# Patient Record
Sex: FEMALE | Race: BLACK OR AFRICAN AMERICAN | Employment: FULL TIME | URBAN - METROPOLITAN AREA
[De-identification: names, ages, dates, MRNs, and addresses within clinical notes are randomized per-mention and may not be internally consistent; named-entity substitution may affect disease eponyms.]

---

## 2017-05-22 ENCOUNTER — HOSPITAL ENCOUNTER (EMERGENCY)
Age: 32
Discharge: HOME | End: 2017-05-22
Attending: EMERGENCY MEDICINE
Payer: SELF-PAY

## 2017-05-22 VITALS
OXYGEN SATURATION: 98 % | WEIGHT: 216.27 LBS | RESPIRATION RATE: 18 BRPM | HEIGHT: 65 IN | TEMPERATURE: 98.5 F | BODY MASS INDEX: 36.03 KG/M2 | SYSTOLIC BLOOD PRESSURE: 143 MMHG | DIASTOLIC BLOOD PRESSURE: 90 MMHG

## 2017-05-22 DIAGNOSIS — I10 ESSENTIAL HYPERTENSION: ICD-10-CM

## 2017-05-22 DIAGNOSIS — M62.838 MUSCLE SPASM: ICD-10-CM
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
## 2017-05-22 DIAGNOSIS — M54.2 NECK PAIN: Primary | ICD-10-CM

## 2017-05-22 PROCEDURE — 99282 EMERGENCY DEPT VISIT SF MDM: CPT

## 2017-05-22 RX ORDER — METHOCARBAMOL 750 MG/1
750 TABLET, FILM COATED ORAL 3 TIMES DAILY
Qty: 15 TAB | Refills: 0 | Status: SHIPPED | OUTPATIENT
Start: 2017-05-22 | End: 2017-05-27

## 2017-05-22 RX ORDER — OXYCODONE AND ACETAMINOPHEN 5; 325 MG/1; MG/1
1 TABLET ORAL
Qty: 15 TAB | Refills: 0 | Status: SHIPPED | OUTPATIENT
Start: 2017-05-22 | End: 2017-06-06

## 2017-05-22 RX ORDER — GLIPIZIDE 5 MG/1
5 TABLET ORAL DAILY
COMMUNITY
End: 2017-09-08

## 2017-05-22 RX ORDER — METFORMIN HYDROCHLORIDE 1000 MG/1
1000 TABLET ORAL 2 TIMES DAILY WITH MEALS
COMMUNITY
End: 2019-10-25
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
## 2017-05-22 NOTE — ED NOTES
PA at bedside to provide discharge paperwork. Vital signs stable. Pt in no apparent distress at this time. Mental status at baseline. Ambulatory to waiting room with steady gate, discharge paperwork in hand.
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
## 2017-05-26 ENCOUNTER — HOSPITAL ENCOUNTER (EMERGENCY)
Age: 32
Discharge: HOME | End: 2017-05-26
Attending: EMERGENCY MEDICINE
Payer: SELF-PAY

## 2017-05-26 VITALS
WEIGHT: 214.73 LBS | TEMPERATURE: 97.8 F | HEIGHT: 65 IN | RESPIRATION RATE: 18 BRPM | HEART RATE: 98 BPM | DIASTOLIC BLOOD PRESSURE: 96 MMHG | BODY MASS INDEX: 35.78 KG/M2 | SYSTOLIC BLOOD PRESSURE: 164 MMHG | OXYGEN SATURATION: 97 %

## 2017-05-26 DIAGNOSIS — Z76.0 MEDICATION REFILL: Primary | ICD-10-CM

## 2017-05-26 PROCEDURE — 99282 EMERGENCY DEPT VISIT SF MDM: CPT
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
## 2017-05-26 NOTE — DISCHARGE INSTRUCTIONS
Medication Refill: Care Instructions  Your Care Instructions  Medicines are a big part of treatment for many health problems. So it can be upsetting to run out of your medicine. It may even be dangerous to stop a medicine suddenly. The doctor may have given you enough medicine to help you until you can see your regular doctor. The doctor has checked you carefully, but problems can develop later. If you notice any problems or new symptoms, get medical treatment right away. Follow-up care is a key part of your treatment and safety. Be sure to make and go to all appointments, and call your doctor if you are having problems. It's also a good idea to know your test results and keep a list of the medicines you take. How can you care for yourself at home? · Be safe with medicines. Take your medicines exactly as prescribed. · Know when you will run out of your medicine. Use a calendar to remind you to get a refill. Don't wait until you have only a few pills left. · Talk with your doctor or pharmacist about your medicine. Find out what to do if you miss a dose. When should you call for help? Call your doctor now or seek immediate medical care if:  · You have problems with your medicine. Watch closely for changes in your health, and be sure to contact your doctor if you have any problems. Where can you learn more? Go to http://asael-sloan.info/. Enter K326 in the search box to learn more about \"Medication Refill: Care Instructions. \"  Current as of: August 14, 2016  Content Version: 11.2  © 4345-1433 [employer]. Care instructions adapted under license by [employer] (which disclaims liability or warranty for this information). If you have questions about a medical condition or this instruction, always ask your healthcare professional. [address] any warranty or liability for your use of this information.   Thank you for allowing us to provide you with excellent care today. We hope we addressed all of your concerns and needs. We strive to provide excellent quality care in the Emergency Department. Please rate us as excellent, as anything less than excellent does not meet our expectations. If you feel that you have not received excellent quality care or timely care, please ask to speak to the nurse manager. Please choose us in the future for your continued health care needs. The exam and treatment you received in the Emergency Department were for an urgent problem and are not intended as complete care. It is important that you follow-up with a doctor, nurse practitioner, or  [id number] assistant to: (1) confirm your diagnosis, (2) re-evaluation of changes in your illness and treatment, and (3) for ongoing care. If your symptoms become worse or you do not improve as expected and you are unable to reach your usual health care provider, you should return to the Emergency Department. We are available 24 hours a day. Take this sheet with you when you go to your follow-up visit. If you have any problem arranging the follow-up visit, contact the Emergency Department immediately. Make an appointment with your Primary Care doctor for follow up of this visit. Return to the ER if you are unable to be seen in the time recommended on your discharge instructions. Joann Carvalho Pagé FNP-BC    Narcotic and Sedating Medication Information  The Center for Disease Control has recently highlighted that opioid abuse is a major medical condition effecting patients across the [place]. Since , approximately 165,000 people have  from prescription opioids. In an effort to decrease these untimely deaths associated with prescription narcotics and sedating drugs, the Emergency Department has adopted the following policy with regard to prescribing narcotic and sedating medications.    1. When patients come to the Emergency Department (ED) with acute medical conditions in which the Emergency Department provider feels appropriate to prescribe narcotic or sedating pain medication, the provider will prescribe these in very limited quantities. The amount of these medications will last only until you can see your primary care provider in his/her office. Any patient who returns to the ED seeking refills will be given only non-narcotic pain medications. 2. Non-narcotic pain medication only will be given to patients who have frequent ED visits due to chronic, on-going pain conditions, such as migraine headaches, back and neck pain, dental pain, fibromyalgia and/or neuropathies. 3. In the event of an acute medical condition exists and the emergency physician feels it is necessary that the patient be given a narcotic or sedating medication - A responsible adult  must be present in the room prior to the medication being given by the nurse. 4. Prescriptions for narcotic or sedating medications that have been lost, stolen or  will not be refilled in the Emergency Department. Patients who have chronic pain will receive non-narcotic pain prescriptions until seen by their primary care physician. It is every patients personal responsibility to maintain active prescriptions with his or her primary care physician or specialist.   5. The Emergency Department have lists available of Primary Care Providers Accepting New Patients. If you do not have a primary care provider, this listing will assist you in obtaining a provider for follow-up of your medical condition. This list will be given to you with your discharge instructions. 6. If the ED provider decides to prescribe a narcotic or sedating medication, most patients names will be checked first through the [place]. This database is a record of controlled substance medication prescriptions that the patient has received.  This has been established by [place] and other states within the compact in an effort to eliminate the dangerous, and often life threatening, practice of obtaining multiple prescriptions from different medical providers. NARCOTIC AND SEDATING MEDICATION THERAPY - SIDE EFFECTS, RISKS AND COMPLICATIONS   The patient understands that narcotic analgesics may result in physical dependence that may ultimately require slow weaning once the pain condition improves. Immediate discontinuation of this medication is not advised. Tolerance to the medication may develop after long-term usage, which means that ultimately these medications may become less effective. Other side effects may include . ..  Respiratory Depression resulting in respiratory arrest and/or death, as well as cardiac arrest and/or death   Tolerance and/or physical dependence necessitating tapered discontinuation of the medications   Withdrawal phenomenon with abrupt discontinuation of the medication causing significant side effects such as palpitations, diaphoresis, elevated pulse and blood pressure   Disorientation, resulting in falls and significant injury   Constipation and bowel obstruction, possibly requiring surgical intervention and potentially ischemic (dead) bowel, sepsis and death   Allergic and/or anaphylactic reactions to the medications resulting in hypotension, tachycardia, arrhythmias, respiratory and cardiac arrest and death   Potentiation of other sedative medications causing additive and/or synergistic interactions and greater than expected or enhanced side effects  What Not to Do While Taking Narcotics or Sedating Medications   Any kind of activity where judgment is required such as signing important documents, caring for the sick, the elderly, or the very young.    Driving a car, motorcycle, truck, or any motorized device  [employer]   Working in high risk areas (such as construction sites, elevated work areas, working with power tools etc.)   Drinking alcohol is highly discouraged while on narcotics and sedative medications due to potent and unpredictable enhancement of central nervous system depression of these two substances when taken together.

## 2017-06-14 ENCOUNTER — APPOINTMENT (OUTPATIENT)
Dept: GENERAL RADIOLOGY | Age: 32
End: 2017-06-14
Attending: EMERGENCY MEDICINE
Payer: SELF-PAY

## 2017-06-14 ENCOUNTER — HOSPITAL ENCOUNTER (EMERGENCY)
Age: 32
Discharge: HOME | End: 2017-06-14
Attending: EMERGENCY MEDICINE
Payer: SELF-PAY

## 2017-06-14 VITALS
BODY MASS INDEX: 34.99 KG/M2 | TEMPERATURE: 98.6 F | HEART RATE: 78 BPM | OXYGEN SATURATION: 98 % | DIASTOLIC BLOOD PRESSURE: 76 MMHG | SYSTOLIC BLOOD PRESSURE: 128 MMHG | HEIGHT: 65 IN | RESPIRATION RATE: 18 BRPM | WEIGHT: 210 LBS

## 2017-06-14 DIAGNOSIS — M79.89 LEG SWELLING: Primary | ICD-10-CM

## 2017-06-14 DIAGNOSIS — R60.0 BILATERAL EDEMA OF LOWER EXTREMITY: ICD-10-CM

## 2017-06-14 DIAGNOSIS — N39.0 URINARY TRACT INFECTION WITHOUT HEMATURIA, SITE UNSPECIFIED: ICD-10-CM

## 2017-06-14 LAB
ALBUMIN SERPL BCP-MCNC: 3.3 G/DL (ref 3.5–5)
ALBUMIN/GLOB SERPL: 0.9 {RATIO} (ref 1.1–2.2)
ALP SERPL-CCNC: 106 U/L (ref 45–117)
ALT SERPL-CCNC: 18 U/L (ref 12–78)
ANION GAP BLD CALC-SCNC: 6 MMOL/L (ref 5–15)
APPEARANCE UR: CLEAR
AST SERPL W P-5'-P-CCNC: 9 U/L (ref 15–37)
BACTERIA URNS QL MICRO: ABNORMAL /HPF
BASOPHILS # BLD AUTO: 0 K/UL (ref 0–0.1)
BASOPHILS # BLD: 0 % (ref 0–1)
BILIRUB SERPL-MCNC: 0.3 MG/DL (ref 0.2–1)
BILIRUB UR QL: NEGATIVE
BNP SERPL-MCNC: 24 PG/ML (ref 0–125)
BUN SERPL-MCNC: 7 MG/DL (ref 6–20)
BUN/CREAT SERPL: 10 (ref 12–20)
CALCIUM SERPL-MCNC: 9.4 MG/DL (ref 8.5–10.1)
CHLORIDE SERPL-SCNC: 99 MMOL/L (ref 97–108)
CO2 SERPL-SCNC: 29 MMOL/L (ref 21–32)
COLOR UR: ABNORMAL
CREAT SERPL-MCNC: 0.72 MG/DL (ref 0.55–1.02)
EOSINOPHIL # BLD: 0.1 K/UL (ref 0–0.4)
EOSINOPHIL NFR BLD: 1 % (ref 0–7)
EPITH CASTS URNS QL MICRO: ABNORMAL /LPF
ERYTHROCYTE [DISTWIDTH] IN BLOOD BY AUTOMATED COUNT: 11.7 % (ref 11.5–14.5)
GLOBULIN SER CALC-MCNC: 3.6 G/DL (ref 2–4)
GLUCOSE SERPL-MCNC: 332 MG/DL (ref 65–100)
GLUCOSE UR STRIP.AUTO-MCNC: >1000 MG/DL
HCT VFR BLD AUTO: 36.7 % (ref 35–47)
HGB BLD-MCNC: 12.5 G/DL (ref 11.5–16)
HGB UR QL STRIP: NEGATIVE
KETONES UR QL STRIP.AUTO: NEGATIVE MG/DL
LEUKOCYTE ESTERASE UR QL STRIP.AUTO: NEGATIVE
LYMPHOCYTES # BLD AUTO: 43 % (ref 12–49)
LYMPHOCYTES # BLD: 3.5 K/UL (ref 0.8–3.5)
MAGNESIUM SERPL-MCNC: 1.8 MG/DL (ref 1.6–2.4)
MCH RBC QN AUTO: 28.3 PG (ref 26–34)
MCHC RBC AUTO-ENTMCNC: 34.1 G/DL (ref 30–36.5)
MCV RBC AUTO: 83 FL (ref 80–99)
MONOCYTES # BLD: 0.4 K/UL (ref 0–1)
MONOCYTES NFR BLD AUTO: 5 % (ref 5–13)
NEUTS SEG # BLD: 4.1 K/UL (ref 1.8–8)
NEUTS SEG NFR BLD AUTO: 51 % (ref 32–75)
NITRITE UR QL STRIP.AUTO: POSITIVE
PH UR STRIP: 6.5 [PH] (ref 5–8)
PLATELET # BLD AUTO: 209 K/UL (ref 150–400)
POTASSIUM SERPL-SCNC: 4.4 MMOL/L (ref 3.5–5.1)
PROT SERPL-MCNC: 6.9 G/DL (ref 6.4–8.2)
PROT UR STRIP-MCNC: NEGATIVE MG/DL
RBC # BLD AUTO: 4.42 M/UL (ref 3.8–5.2)
RBC #/AREA URNS HPF: ABNORMAL /HPF (ref 0–5)
SODIUM SERPL-SCNC: 134 MMOL/L (ref 136–145)
SP GR UR REFRACTOMETRY: 1.01 (ref 1–1.03)
UA: UC IF INDICATED,UAUC: ABNORMAL
UROBILINOGEN UR QL STRIP.AUTO: 1 EU/DL (ref 0.2–1)
WBC # BLD AUTO: 8.2 K/UL (ref 3.6–11)
WBC URNS QL MICRO: ABNORMAL /HPF (ref 0–4)

## 2017-06-14 PROCEDURE — 85025 COMPLETE CBC W/AUTO DIFF WBC: CPT | Performed by: EMERGENCY MEDICINE

## 2017-06-14 PROCEDURE — 81001 URINALYSIS AUTO W/SCOPE: CPT | Performed by: EMERGENCY MEDICINE

## 2017-06-14 PROCEDURE — 74011250637 HC RX REV CODE- 250/637: Performed by: EMERGENCY MEDICINE

## 2017-06-14 PROCEDURE — 36415 COLL VENOUS BLD VENIPUNCTURE: CPT | Performed by: EMERGENCY MEDICINE

## 2017-06-14 PROCEDURE — 71020 XR CHEST PA LAT: CPT

## 2017-06-14 PROCEDURE — 83880 ASSAY OF NATRIURETIC PEPTIDE: CPT | Performed by: EMERGENCY MEDICINE

## 2017-06-14 PROCEDURE — 87086 URINE CULTURE/COLONY COUNT: CPT | Performed by: EMERGENCY MEDICINE

## 2017-06-14 PROCEDURE — 80053 COMPREHEN METABOLIC PANEL: CPT | Performed by: EMERGENCY MEDICINE

## 2017-06-14 PROCEDURE — 99285 EMERGENCY DEPT VISIT HI MDM: CPT

## 2017-06-14 PROCEDURE — 83735 ASSAY OF MAGNESIUM: CPT | Performed by: EMERGENCY MEDICINE

## 2017-06-14 PROCEDURE — 87147 CULTURE TYPE IMMUNOLOGIC: CPT | Performed by: EMERGENCY MEDICINE

## 2017-06-14 RX ORDER — SODIUM CHLORIDE 0.9 % (FLUSH) 0.9 %
5-10 SYRINGE (ML) INJECTION AS NEEDED
Status: DISCONTINUED | OUTPATIENT
Start: 2017-06-14 | End: 2017-06-14 | Stop reason: HOSPADM

## 2017-06-14 RX ORDER — HYDROCODONE BITARTRATE AND ACETAMINOPHEN 5; 325 MG/1; MG/1
1 TABLET ORAL
Qty: 20 TAB | Refills: 0 | Status: SHIPPED | OUTPATIENT
Start: 2017-06-14 | End: 2017-06-25

## 2017-06-14 RX ORDER — CEPHALEXIN 500 MG/1
500 CAPSULE ORAL 4 TIMES DAILY
Qty: 28 CAP | Refills: 0 | Status: SHIPPED | OUTPATIENT
Start: 2017-06-14 | End: 2017-06-21

## 2017-06-14 RX ORDER — SODIUM CHLORIDE 0.9 % (FLUSH) 0.9 %
5-10 SYRINGE (ML) INJECTION EVERY 8 HOURS
Status: DISCONTINUED | OUTPATIENT
Start: 2017-06-14 | End: 2017-06-14 | Stop reason: HOSPADM

## 2017-06-14 RX ORDER — HYDROCHLOROTHIAZIDE 12.5 MG/1
12.5 TABLET ORAL DAILY
Qty: 30 TAB | Refills: 0 | Status: SHIPPED | OUTPATIENT
Start: 2017-06-14 | End: 2017-09-08

## 2017-06-14 RX ORDER — HYDROCODONE BITARTRATE AND ACETAMINOPHEN 5; 325 MG/1; MG/1
1 TABLET ORAL
Status: COMPLETED | OUTPATIENT
Start: 2017-06-14 | End: 2017-06-14

## 2017-06-14 RX ADMIN — HYDROCODONE BITARTRATE AND ACETAMINOPHEN 1 TABLET: 5; 325 TABLET ORAL at 02:00

## 2017-06-14 RX ADMIN — Medication 10 ML: at 02:12

## 2017-06-14 NOTE — ED NOTES
Pt report BLE leg swelling x2 days. Denies SOB/CP/fevers/chills/N/V/D. Denies recent diet changes. H/o DM, reports has been elevating feet without relief.

## 2017-06-14 NOTE — ED PROVIDER NOTES
HPI Comments: Kyleigh Bianchi is a 33 y.o. female, pmhx DM, who presents ambulatory to the ED c/o progressively worsening BLE swelling and pain x 2 days. She states her sxs are exacerbated with ambulation. 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 Pt notes she ate a bag of Fritos the day before onset of sxs and questions whether or not this is the cause. She states she otherwise sits at a desk all day with her feet dangling from her chair. She denies any hx of similar sxs. Pt denies any recent medications for her current sxs. Pt specifically denies any recent fever, chills, nausea, vomiting, diarrhea, abd pain, CP, SOB, lightheadedness, dizziness, HA, heart palpitations, urinary sxs, changes in BM, changes in PO intake, melena, hematochezia, cough, or congestion. PCP: Pepe Lemus NP    PMHx: Significant for DM, back pain  PSHx: Significant for cholecystectomy,   Social Hx: -tobacco, -EtOH, -Illicit Drugs    There are no other complaints, changes, or physical findings at this time. The history is provided by the patient. Past Medical History:   Diagnosis Date    Abnormal Pap smear     abnormal pap at 15 y/o normal since     Back pain 2015    [facility]  [facility]ency [facility]    Diabetes [facility])     Diabetes mellitus     Gestational Diabetes with last two pregnancies-diet controlled       Past Surgical History:   Procedure Laterality Date    HX  SECTION      HX CHOLECYSTECTOMY      HX OTHER SURGICAL      cholecystectomy          Family History:   Problem Relation Age of Onset    Diabetes Father     Hypertension Father     Stroke Father     Kidney Disease Father        Social History     Social History    Marital status: SINGLE     Spouse name: N/A    Number of children: N/A    Years of education: N/A     Occupational History    Not on file.      Social History Main Topics    Smoking status: Never Smoker    Smokeless tobacco: Never Used    Alcohol use No    Drug use: No    Sexual activity: Yes     Birth control/ protection: None     Other Topics Concern    Not on file     Social History Narrative         ALLERGIES: Tramadol    Review of Systems   Constitutional: Negative for chills and fever. HENT: Negative for congestion, ear pain, rhinorrhea and sore throat. Respiratory: Negative for cough and shortness of breath. Cardiovascular: Positive for leg swelling. Negative for chest pain and palpitations. Gastrointestinal: Negative for abdominal pain, constipation, diarrhea, nausea and vomiting. No melena  No hematochezia   Endocrine: Negative for polyuria. Genitourinary: Negative for dysuria, frequency and hematuria. Neurological: Negative for dizziness, weakness, light-headedness, numbness and headaches. No tingling   All other systems reviewed and are negative. Vitals:    06/14/17 0130 06/14/17 0200 06/14/17 0316 06/14/17 0330   BP: 159/87 124/72 127/82 128/76   Pulse: 89 74 75 78   Resp: 18 18 18 18   Temp:       SpO2: 100% 96% 97% 98%   Weight:       Height:                Physical Exam   Nursing note and vitals reviewed.   General appearance - well nourished, well appearing, and in no distress  Eyes - pupils equal and reactive, extraocular eye movements intact  ENT - mucous membranes moist, pharynx normal without lesions  Neck - supple, no significant adenopathy; non-tender to palpation  Chest - clear to auscultation, no wheezes, rales or rhonchi; non-tender to palpation  Heart - normal rate and regular rhythm, S1 and S2 normal, no murmurs noted  Abdomen - soft, nontender, nondistended, no masses or organomegaly  Musculoskeletal - no joint tenderness, deformity; normal ROM, 1+ pitting edema to BLE up to the mid-calves  Extremities - peripheral pulses normal  Skin - normal coloration and turgor, no rashes  Neurological - alert, oriented x3, normal speech, no focal findings or movement disorder noted  Written by Michel Law ED Scribe, as dictated by Gaston Ennis MD    MDM  Number of Diagnoses or Management Options  Bilateral edema of lower extremity:   Leg swelling:   Urinary tract infection without hematuria, site unspecified:   Diagnosis management comments:   DDx: Dependent edema, CHF, excessive salt intake       Amount and/or Complexity of Data Reviewed  Clinical lab tests: reviewed and ordered  Tests in the radiology section of CPT®: reviewed and ordered  Review and summarize past medical records: yes  Independent visualization of images, tracings, or specimens: yes    Patient Progress  Patient progress: stable      Procedures      Progress note:  3:42 AM  Pt noted to be feeling better, ready for discharge. UTI incidental finding; will cover with Keflex. Updated pt and/or family on all final lab and imaging findings. Will follow up as instructed. All questions have been answered, pt voiced understanding and agreement with plan. Narcotics were prescribed, pt was advised not to drive or operate heavy machinery. Abx were prescribed, pt advised that diarrhea and rash are possible side effects of the medications. Specific return precautions provided as well as instructions to return to the ED should sx worsen at any time. Vital signs stable for discharge.    Written by Michel Law ED Scribe, as dictated by Bruna Vegas MD     LABORATORY TESTS:  Recent Results (from the past 12 hour(s))   CBC WITH AUTOMATED DIFF    Collection Time: 06/14/17  2:14 AM   Result Value Ref Range    WBC 8.2 3.6 - 11.0 K/uL    RBC 4.42 3.80 - 5.20 M/uL    HGB 12.5 11.5 - 16.0 g/dL    HCT 36.7 35.0 - 47.0 %    MCV 83.0 80.0 - 99.0 FL    MCH 28.3 26.0 - 34.0 PG    MCHC 34.1 30.0 - 36.5 g/dL    RDW 11.7 11.5 - 14.5 %    PLATELET 174 003 - 731 K/uL    NEUTROPHILS 51 32 - 75 %    LYMPHOCYTES 43 12 - 49 %    MONOCYTES 5 5 - 13 %    EOSINOPHILS 1 0 - 7 %    BASOPHILS 0 0 - 1 %    ABS. NEUTROPHILS 4.1 1.8 - 8.0 K/UL    ABS. LYMPHOCYTES 3.5 0.8 - 3.5 K/UL    ABS. MONOCYTES 0.4 0.0 - 1.0 K/UL    ABS. EOSINOPHILS 0.1 0.0 - 0.4 K/UL    ABS. BASOPHILS 0.0 0.0 - 0.1 K/UL   METABOLIC PANEL, COMPREHENSIVE    Collection Time: 06/14/17  2:14 AM   Result Value Ref Range    Sodium 134 (L) 136 - 145 mmol/L    Potassium 4.4 3.5 - 5.1 mmol/L    Chloride 99 97 - 108 mmol/L    CO2 29 21 - 32 mmol/L    Anion gap 6 5 - 15 mmol/L    Glucose 332 (H) 65 - 100 mg/dL    BUN 7 6 - 20 MG/DL    Creatinine 0.72 0.55 - 1.02 MG/DL    BUN/Creatinine ratio 10 (L) 12 - 20      GFR est AA >60 >60 ml/min/1.73m2    GFR est non-AA >60 >60 ml/min/1.73m2    Calcium 9.4 8.5 - 10.1 MG/DL    Bilirubin, total 0.3 0.2 - 1.0 MG/DL    ALT (SGPT) 18 12 - 78 U/L    AST (SGOT) 9 (L) 15 - 37 U/L    Alk.  phosphatase 106 45 - 117 U/L    Protein, total 6.9 6.4 - 8.2 g/dL    Albumin 3.3 (L) 3.5 - 5.0 g/dL    Globulin 3.6 2.0 - 4.0 g/dL    A-G Ratio 0.9 (L) 1.1 - 2.2     MAGNESIUM    Collection Time: 06/14/17  2:14 AM   Result Value Ref Range    Magnesium 1.8 1.6 - 2.4 mg/dL   PRO-BNP    Collection Time: 06/14/17  2:14 AM   Result Value Ref Range    NT pro-BNP 24 0 - 125 PG/ML   URINALYSIS W/ REFLEX CULTURE    Collection Time: 06/14/17  2:14 AM   Result Value Ref Range    Color YELLOW/STRAW      Appearance CLEAR CLEAR      Specific gravity 1.010 1.003 - 1.030      pH (UA) 6.5 5.0 - 8.0      Protein NEGATIVE  NEG mg/dL    Glucose >1000 (A) NEG mg/dL    Ketone NEGATIVE  NEG mg/dL    Bilirubin NEGATIVE  NEG      Blood NEGATIVE  NEG      Urobilinogen 1.0 0.2 - 1.0 EU/dL    Nitrites POSITIVE (A) NEG      Leukocyte Esterase NEGATIVE  NEG      WBC 0-4 0 - 4 /hpf    RBC 0-5 0 - 5 /hpf    Epithelial cells FEW FEW /lpf    Bacteria 3+ (A) NEG /hpf    UA:UC IF INDICATED URINE CULTURE ORDERED (A) CNI         IMAGING RESULTS:     CXR Results  (Last 48 hours)               06/14/17 0222  XR CHEST PA LAT Final result    Impression: impression: No acute changes. Narrative:  Clinical indication: Chest pain. Frontal and lateral views of the chest obtained. The heart size is normal. There   is no acute infiltrate. MEDICATIONS GIVEN:  Medications   sodium chloride (NS) flush 5-10 mL (10 mL IntraVENous Given 6/14/17 0212)   sodium chloride (NS) flush 5-10 mL (not administered)   HYDROcodone-acetaminophen (NORCO) 5-325 mg per tablet 1 Tab (1 Tab Oral Given 6/14/17 0200)       IMPRESSION:  1. Leg swelling    2. Bilateral edema of lower extremity    3. Urinary tract infection without hematuria, site unspecified        PLAN:  1. Discharge Medication List as of 6/14/2017  3:40 AM      START taking these medications    Details   hydroCHLOROthiazide (HYDRODIURIL) 12.5 mg tablet Take 1 Tab by mouth daily. , Print, Disp-30 Tab, R-0      HYDROcodone-acetaminophen (NORCO) 5-325 mg per tablet Take 1 Tab by mouth every four (4) hours as needed for Pain. Max Daily Amount: 6 Tabs., Print, Disp-20 Tab, R-0      cephALEXin (KEFLEX) 500 mg capsule Take 1 Cap by mouth four (4) times daily for 7 days. , Print, Disp-28 Cap, R-0         CONTINUE these medications which have NOT CHANGED    Details   metFORMIN (GLUCOPHAGE) 1,000 mg tablet Take 1,000 mg by mouth daily. , Historical Med      glipiZIDE (GLUCOTROL) 5 mg tablet Take 5 mg by mouth daily. , Historical Med           2. Follow-up Information     Follow up With Details Comments [address], NP In 2 days  [address] [facility]  [phone]      hospitals EMERGENCY DEPT  If symptoms worsen [address]  [address] [facility]  [phone]        Return to ED if worse     DISCHARGE NOTE:  3:43 AM  The patient's results have been reviewed with family and/or caregiver. They verbally convey their understanding and agreement of the patient's signs, symptoms, diagnosis, treatment, and prognosis.  They additionally agree to follow up as recommended in the discharge instructions or to return to the Emergency Room should the patient's condition change prior to their follow-up appointment. The family and/or caregiver verbally agrees with the care-plan and all of their questions have been answered. The discharge instructions have also been provided to the them along with educational information regarding the patient's diagnosis and a list of reasons why the patient would want to return to the ER prior to their follow-up appointment should their condition change. Written by Bunny Aguilar, ED Scribe, as dictated by Lio Lara MD.         This note is prepared by Bunny Aguilar, acting as Scribe for MD Bruna Gama MD : The scribe's documentation has been prepared under my direction and personally reviewed by me in its entirety. I confirm that the note above accurately reflects all work, treatment, procedures, and medical decision making performed by me. This note will not be viewable in [address]

## 2017-06-14 NOTE — DISCHARGE INSTRUCTIONS
Urinary Tract Infection in Women: Care Instructions  Your Care Instructions    A urinary tract infection, or UTI, is a general term for an infection anywhere between the kidneys and the urethra (where urine comes out). Most UTIs are bladder infections. They often cause pain or burning when you urinate. UTIs are caused by bacteria and can be cured with antibiotics. Be sure to complete your treatment so that the infection goes away. Follow-up care is a key part of your treatment and safety. Be sure to make and go to all appointments, and call your doctor if you are having problems. It's also a good idea to know your test results and keep a list of the medicines you take. How can you care for yourself at home? · Take your antibiotics as directed. Do not stop taking them just because you feel better. You need to take the full course of antibiotics. · Drink extra water and other fluids for the next day or two. This may help wash out the bacteria that are causing the infection. (If you have kidney, heart, or liver disease and have to limit fluids, talk with your doctor before you increase your fluid intake.)  · Avoid drinks that are carbonated or have caffeine. They can irritate the bladder. · Urinate often. Try to empty your bladder each time. · To relieve pain, take a hot bath or lay a heating pad set on low over your lower belly or genital area. Never go to sleep with a heating pad in place. To prevent UTIs  · Drink plenty of water each day. This helps you urinate often, which clears bacteria from your system. (If you have kidney, heart, or liver disease and have to limit fluids, talk with your doctor before you increase your fluid intake.)  · Urinate when you need to. · Urinate right after you have sex. · Change sanitary pads often. · Avoid douches, bubble baths, feminine hygiene sprays, and other feminine hygiene products that have deodorants.   · After going to the bathroom, wipe from front to back.  When should you call for help? Call your doctor now or seek immediate medical care if:  · Symptoms such as fever, chills, nausea, or vomiting get worse or appear for the first time. · You have new pain in your back just below your rib cage. This is called flank pain. · There is new blood or pus in your urine. · You have any problems with your antibiotic medicine. Watch closely for changes in your health, and be sure to contact your doctor if:  · You are not getting better after taking an antibiotic for 2 days. · Your symptoms go away but then come back. Where can you learn more? Go to http://asael-sloan.info/. Enter C525 in the search box to learn more about \"Urinary Tract Infection in Women: Care Instructions. \"  Current as of: November 28, 2016  Content Version: 11.2  © 5328-2209 [employer]. Care instructions adapted under license by [employer] (which disclaims liability or warranty for this information). If you have questions about a medical condition or this instruction, always ask your healthcare professional. [place] [address] any warranty or liability for your use of this information. Leg and Ankle Edema: Care Instructions  Your Care Instructions  Swelling in the legs, ankles, and feet is called edema. It is common after you sit or stand for a while. Long plane flights or car rides often cause swelling in the legs and feet. You may also have swelling if you have to stand for long periods of time at your job. Problems with the veins in the legs (varicose veins) and changes in hormones can also cause swelling. Sometimes the swelling in the ankles and feet is caused by a more serious problem, such as heart failure, infection, blood clots, or liver or kidney disease. Follow-up care is a key part of your treatment and safety. Be sure to make and go to all appointments, and call your doctor if you are having problems.  Its also a good idea to know your test results and keep a list of the medicines you take. How can you care for yourself at home? · If your doctor gave you medicine, take it as prescribed. Call your doctor if you think you are having a problem with your medicine. · Whenever you are resting, raise your legs up. Try to keep the swollen area higher than the level of your heart. · Take breaks from standing or sitting in one position. ¨ Walk around to increase the blood flow in your lower legs. ¨ Move your feet and ankles often while you stand, or tighten and relax your leg muscles. · Wear support stockings. Put them on in the morning, before swelling gets worse. · Eat a balanced diet. Lose weight if you need to. · Limit the amount of salt (sodium) in your diet. Salt holds fluid in the body and may increase swelling. When should you call for help? Call 911 anytime you think you may need emergency care. For example, call if:  · You have symptoms of a blood clot in your lung (called a pulmonary embolism). These may include:  ¨ Sudden chest pain. ¨ Trouble breathing. ¨ Coughing up blood. Call your doctor now or seek immediate medical care if:  · You have signs of a blood clot, such as:  ¨ Pain in your calf, back of the knee, thigh, or groin. ¨ Redness and swelling in your leg or groin. · You have symptoms of infection, such as:  ¨ Increased pain, swelling, warmth, or redness. ¨ Red streaks or pus. ¨ A fever. Watch closely for changes in your health, and be sure to contact your doctor if:  · Your swelling is getting worse. · You have new or worsening pain in your legs. · You do not get better as expected. Where can you learn more? Go to http://asael-sloan.info/. Enter C099 in the search box to learn more about \"Leg and Ankle Edema: Care Instructions. \"  Current as of: May 27, 2016  Content Version: 11.2  © 0778-5673 [employer], [employer].  Care instructions adapted under license by [employer] (which disclaims liability or warranty for this information). If you have questions about a medical condition or this instruction, always ask your healthcare professional. [address] any warranty or liability for your use of this information.

## 2017-06-14 NOTE — ED NOTES
.Discharge instructions reviewed with patient and given to pt per Md Romi Franco. Pt able to return/verbalize discharge instructions. Copy of discharge instructions given. RX given to pt. Pt condition stable, no further complaints. Pt out of ER, accompanied by self & family. Family to drive patient home. Assisted to car via wheelchair by family member, per patient preference. Patient out of ED prior to obtaining discharge vitals. Belongings & discharge paperwork out of ED with patient.

## 2017-06-14 NOTE — LETTER
[address] 
[facility] EMERGENCY DEPT 
[address] [place] [address] [address] [id number] [phone] Work/School Note Date: 6/14/2017 To Whom It May concern: 
 
Gaylord Frankel was seen and treated today in the emergency room by the following provider(s): 
Attending Provider: Daniel Gordon MD.   
 
Gaylord Frankel should be excused from work on 6/14/2017. Sincerely, Daniel Gordon MD

## 2017-06-15 LAB
BACTERIA SPEC CULT: ABNORMAL
CC UR VC: ABNORMAL
SERVICE CMNT-IMP: ABNORMAL

## 2017-06-25 ENCOUNTER — HOSPITAL ENCOUNTER (EMERGENCY)
Age: 32
Discharge: HOME | End: 2017-06-25
Attending: EMERGENCY MEDICINE
Payer: SELF-PAY

## 2017-06-25 VITALS
TEMPERATURE: 99.2 F | OXYGEN SATURATION: 100 % | HEIGHT: 65 IN | HEART RATE: 102 BPM | SYSTOLIC BLOOD PRESSURE: 151 MMHG | RESPIRATION RATE: 16 BRPM | WEIGHT: 218.7 LBS | BODY MASS INDEX: 36.44 KG/M2 | DIASTOLIC BLOOD PRESSURE: 95 MMHG

## 2017-06-25 DIAGNOSIS — G50.1 ATYPICAL FACIAL PAIN: Primary | ICD-10-CM

## 2017-06-25 DIAGNOSIS — K08.89 DENTALGIA: ICD-10-CM

## 2017-06-25 PROCEDURE — 99282 EMERGENCY DEPT VISIT SF MDM: CPT

## 2017-06-25 RX ORDER — HYDROCODONE BITARTRATE AND ACETAMINOPHEN 5; 325 MG/1; MG/1
1 TABLET ORAL
Qty: 15 TAB | Refills: 0 | Status: SHIPPED | OUTPATIENT
Start: 2017-06-25 | End: 2017-07-02

## 2017-06-25 RX ORDER — CLINDAMYCIN HYDROCHLORIDE 150 MG/1
150 CAPSULE ORAL 4 TIMES DAILY
Qty: 28 CAP | Refills: 0 | Status: SHIPPED | OUTPATIENT
Start: 2017-06-25 | End: 2017-07-02

## 2017-06-25 RX ORDER — IBUPROFEN 800 MG/1
800 TABLET ORAL
Qty: 20 TAB | Refills: 0 | Status: SHIPPED | OUTPATIENT
Start: 2017-06-25 | End: 2017-07-02

## 2017-06-25 NOTE — DISCHARGE INSTRUCTIONS
Tooth and Gum Pain: Care Instructions  Your Care Instructions    The most common causes of dental pain are tooth decay and gum disease. Pain can also be caused by an infection of the tooth (abscess) or the gums. Or you may have pain from a broken or cracked tooth. Other causes of pain include infection and damage to a tooth from nervous grinding of your teeth. A wisdom tooth can be painful when it is coming in but cannot break through the gum. It can also be painful when the tooth is only partway in and extra gum tissue has formed around it. The tissue can get inflamed (pericoronitis), and sometimes it gets infected. Prompt dental care can help find the cause of your toothache and keep the tooth from dying or gum disease from getting worse. Self-care at home may reduce your pain and discomfort. Follow-up care is a key part of your treatment and safety. Be sure to make and go to all appointments, and call your dentist or doctor if you are having problems. It's also a good idea to know your test results and keep a list of the medicines you take. How can you care for yourself at home? · To reduce pain and facial swelling, put an ice or cold pack on the outside of your cheek for 10 to 20 minutes at a time. Put a thin cloth between the ice and your skin. Do not use heat. · If your doctor prescribed antibiotics, take them as directed. Do not stop taking them just because you feel better. You need to take the full course of antibiotics. · Ask your doctor if you can take an over-the-counter pain medicine, such as acetaminophen (Tylenol), ibuprofen (Advil, Motrin), or naproxen (Aleve). Be safe with medicines. Read and follow all instructions on the label. · Avoid very hot, cold, or sweet foods and drinks if they increase your pain. · Rinse your mouth with warm salt water every 2 hours to help relieve pain and swelling. Mix 1 teaspoon of salt in 8 ounces of water.   · Talk to your dentist about using special toothpaste for sensitive teeth. To reduce pain on contact with heat or cold or when brushing, brush with this toothpaste regularly or rub a small amount of the paste on the sensitive area with a clean finger 2 or 3 times a day. Floss gently between your teeth. · Do not smoke or use spit tobacco. Tobacco use can make gum problems worse, decreases your ability to fight infection in your gums, and delays healing. If you need help quitting, talk to your doctor about stop-smoking programs and medicines. These can increase your chances of quitting for good. When should you call for help? Call 911 anytime you think you may need emergency care. For example, call if:  · You have trouble breathing. Call your dentist or doctor now or seek immediate medical care if:  · You have signs of infection, such as:  ¨ Increased pain, swelling, warmth, or redness. ¨ Red streaks leading from the area. ¨ Pus draining from the area. ¨ A fever. Watch closely for changes in your health, and be sure to contact your doctor if:  · You do not get better as expected. Where can you learn more? Go to http://asaelnediyor.comsloan.info/. Enter 0363 [id number] in the search box to learn more about \"Tooth and Gum Pain: Care Instructions. \"  Current as of: August 11, 2016  Content Version: 11.3  © 1069-1559 [employer]. Care instructions adapted under license by [employer] (which disclaims liability or warranty for this information). If you have questions about a medical condition or this instruction, always ask your healthcare professional. [place] [address] any warranty or liability for your use of this information. Head or Face Pain: Care Instructions  Your Care Instructions  Common causes of head or face pain are allergies, stress, and injuries. Other causes include tooth problems and sinus infections.  Eating certain foods, such as chocolate or cheese, or drinking certain liquids, such as coffee or cola, can cause head pain for some people. If you have mild head pain, you may not need treatment. It is important to watch your symptoms and talk to your doctor if your pain continues or gets worse. Follow-up care is a key part of your treatment and safety. Be sure to make and go to all appointments, and call your doctor if you are having problems. It's also a good idea to know your test results and keep a list of the medicines you take. How can you care for yourself at home? · Take pain medicines exactly as directed. ¨ If the doctor gave you a prescription medicine for pain, take it as prescribed. ¨ If you are not taking a prescription pain medicine, ask your doctor if you can take an over-the-counter pain medicine. · Take it easy for the next few days or longer if you are not feeling well. · Use a warm, moist towel or heating pad set on low to relax tight muscles in your shoulder and neck. Have someone gently massage your neck and shoulders. · Put ice or a cold pack on the area for 10 to 20 minutes at a time. Put a thin cloth between the ice and your skin. When should you call for help? Call 911 anytime you think you may need emergency care. For example, call if:  · You have twitching, jerking, or a seizure. · You passed out (lost consciousness). · You have symptoms of a stroke. These may include:  ¨ Sudden numbness, tingling, weakness, or loss of movement in your face, arm, or leg, especially on only one side of your body. ¨ Sudden vision changes. ¨ Sudden trouble speaking. ¨ Sudden confusion or trouble understanding simple statements. ¨ Sudden problems with walking or balance. ¨ A sudden, severe headache that is different from past headaches. · You have jaw pain and pain in your chest, shoulder, neck, or arm. Call your doctor now or seek immediate medical care if:  · You have a fever with a stiff neck or a severe headache.   · You have nausea and vomiting, or you cannot keep food or liquids down. Watch closely for changes in your health, and be sure to contact your doctor if:  · Your head or face pain does not get better as expected. Where can you learn more? Go to http://asael-sloan.info/. Enter P568 in the search box to learn more about \"Head or Face Pain: Care Instructions. \"  Current as of: March 20, 2017  Content Version: 11.3  © 7563-7723 [employer]. Care instructions adapted under license by [employer] (which disclaims liability or warranty for this information). If you have questions about a medical condition or this instruction, always ask your healthcare professional. [place] [address] any warranty or liability for your use of this information. Periodontal Conditions: Care Instructions  Your Care Instructions    Periodontal conditions affect the gums, bone, and tissue that surround and support the teeth. The most common problems are caused by plaque. Plaque is a thin film of bacteria that sticks to teeth above and below the gum line. It can build up and harden into tartar. The bacteria in plaque and tartar can cause gum disease. Gingivitis is a disease that affects the gums (gingiva). The gums are the soft tissue that surrounds the teeth. Gingivitis causes red, swollen, tender gums that bleed easily when brushed, persistent bad breath, and sensitive teeth. Because it is not painful, many people do not get treatment when they should. Gingivitis can be reversed with good dental care. Periodontitis is a more advanced disease that affects more than the gums. The gums pull away from the teeth. This leaves deep pockets where bacteria can grow. The disease can damage the bones that support the teeth. The teeth may get loose and fall out. A periodontal condition should be treated as soon as it is found. Finding gum problems early, treating them right away, and having regular checkups bring the best results.   You can treat mild periodontal conditions by brushing and flossing your teeth every day. Your dentist may prescribe a mouthwash to kill the bacteria that can damage teeth and gums. Your dentist may have you take antibiotics to treat infection from moderate periodontal disease. If your gums have pulled away from your teeth, you may need cleaning between the teeth and gums right down to the teeth roots. This is called root planing and scaling. If you have severe periodontal disease, you may need surgery to remove diseased gum tissue or repair bone damage. Follow-up care is a key part of your treatment and safety. Be sure to make and go to all appointments, and call your dentist if you are having problems. It's also a good idea to know your test results and keep a list of the medicines you take. How can you care for yourself at home? · If your dentist prescribed antibiotics, take them as directed. Do not stop taking them just because you feel better. You need to take the full course of antibiotics. · Brush your teeth twice a day, in the morning and at night. ¨ Use a toothbrush with soft, rounded-end bristles and a head that is small enough to reach all parts of your teeth and mouth. Replace your toothbrush every 3 to 4 months. ¨ Use a fluoride toothpaste. ¨ Place the brush at a 45-degree angle where the teeth meet the gums. Press firmly, and gently rock the brush back and forth using small circular movements. ¨ Brush chewing surfaces vigorously with short back-and-forth strokes. ¨ Brush your tongue from back to front. · Floss at least once a day. Choose the type and flavor that you like best.  · Have your teeth cleaned by a professional at least twice a year. · Ask your dentist about using an antibacterial mouthwash to help reduce bacteria. · Rinse your mouth with water or chew sugar-free gum after meals if you can't brush your teeth.   · Do not smoke or use smokeless tobacco. Tobacco use can cause periodontal disease. When should you call for help? Call your dentist now or seek immediate medical care if:  · You have symptoms of infection, such as:  ¨ Increased pain, swelling, warmth, or redness. ¨ Red streaks leading from the area. ¨ Pus draining from the area. ¨ A fever. Watch closely for changes in your health, and be sure to contact your dentist if:  · You have new or worse tooth pain. · You do not get better as expected. Where can you learn more? Go to http://asael-sloan.info/. Enter K004 in the search box to learn more about \"Periodontal Conditions: Care Instructions. \"  Current as of: January 6, 2017  Content Version: 11.3  © 8488-4178 [employer]. Care instructions adapted under license by [employer] (which disclaims liability or warranty for this information). If you have questions about a medical condition or this instruction, always ask your healthcare professional. [place] [address] any warranty or liability for your use of this information. [place] Tooth Extraction: Before Your Procedure  What are wisdom teeth? [place] teeth are the upper and lower third molars. These teeth are at the very back of your mouth. They are the last teeth to surface in the mouth. They are called wisdom teeth because they usually come in when a person is between 16and 24years old--old enough to have gained some \"wisdom. \"  Some people have their wisdom teeth for their entire life. Other people choose to have their wisdom teeth removed. Some people have these teeth taken out before they break through the gums. An oral surgeon or dentist can remove wisdom teeth. The procedure can be done in the dentist's or surgeon's office. You may have the procedure in the hospital, if you are having all your wisdom teeth pulled at one time. Your dentist will open up the gum tissue over the tooth and take out any bone that is covering the tooth.  He or she will separate the tissue that connects the tooth to the bone. Then the dentist will remove the tooth. The tooth may be cut into smaller pieces to make it easier to remove. After the tooth is removed, you may need stitches. Some stitches dissolve over time. Some stitches have to be removed after a few days. Your dentist will tell you if your stitches need to be removed. Follow-up care is a key part of your treatment and safety. Be sure to make and go to all appointments, and call your doctor if you are having problems. It's also a good idea to know your test results and keep a list of the medicines you take. What happens before the procedure? Procedures can be stressful. This information will help you understand what you can expect. And it will help you safely prepare for your procedure. Preparing for the procedure  · Understand exactly what procedure is planned, along with the risks, benefits, and other options. · Tell your doctors ALL the medicines, vitamins, supplements, and herbal remedies you take. Some of these can increase the risk of bleeding or interact with anesthesia. · If you take blood thinners, such as warfarin (Coumadin), clopidogrel (Plavix), or aspirin, be sure to talk to your doctor. He or she will tell you if you should stop taking these medicines before your procedure. Make sure that you understand exactly what your doctor wants you to do. · Your doctor will tell you which medicines to take or stop before your procedure. You may need to stop taking certain medicines a week or more before the procedure. So talk to your doctor as soon as you can. · If you have an advance directive, let your doctor know. It may include a living will and a durable power of  for health care. Bring a copy to the hospital. If you don't have one, you may want to prepare one. It lets your doctor and loved ones know your health care wishes.  Doctors advise that everyone prepare these papers before any type of surgery or procedure. What happens on the day of the procedure? · Follow the instructions exactly about when to stop eating and drinking. If you don't, your procedure may be canceled. If your doctor told you to take your medicines on the day of the procedure, take them with only a sip of water. At the hospital or surgery center  · Bring a picture ID. · You will be kept comfortable and safe by your anesthesia provider. You may get medicine that relaxes you or puts you in a light sleep. The area being worked on will be numb. · How long the procedure will take depends on how many teeth are removed. Going home  · Be sure you have someone to drive you home. Anesthesia and pain medicine make it unsafe for you to drive. · You will be given more specific instructions about recovering from your procedure. They will cover things like diet, wound care, follow-up care, driving, and getting back to your normal routine. When should you call your doctor? · You have questions or concerns. · You don't understand how to prepare for your procedure. · You become ill before the procedure (such as fever, flu, or a cold). · You need to reschedule or have changed your mind about having the procedure. Where can you learn more? Go to http://asael-sloan.info/. Enter T033 in the search box to learn more about \"[place] Tooth Extraction: Before Your Procedure. \"  Current as of: August 9, 2016  Content Version: 11.3  © 2563-1351 [employer]. Care instructions adapted under license by [employer] (which disclaims liability or warranty for this information). If you have questions about a medical condition or this instruction, always ask your healthcare professional. [address] any warranty or liability for your use of this information.

## 2017-06-25 NOTE — ED PROVIDER NOTES
HPI Comments: Grace Rivas, 33 y.o. female, presents ambulatory to [address] [facility] ED with cc of left upper and lower dental pain x 2 days. Pt reports feeling something sharp with her tongue. Pt notes pain is radiates up to ear when she attempts to open her mouth. Pt states she has sensitivity to hot and cold substances. Pt has not had her wisdom teeth removed and has not seen a dentist in over five years. Pt has not taken OTC medication for relief. Pt specifically denies fever, N/V/D, or headache. PCP: Mami Guillen NP    Social history significant for: - Tobacco, - EtOH, - Illicit Drug Use    There are no other complaints, changes, or physical findings at this time. The history is provided by the patient. No  was used. Past Medical History:   Diagnosis Date    Abnormal Pap smear     abnormal pap at 13 y/o normal since     Back pain 2015    [facility]  emergency [facility]    [facility])     Diabetes mellitus     Gestational Diabetes with last two pregnancies-diet controlled       Past Surgical History:   Procedure Laterality Date    HX  SECTION      HX CHOLECYSTECTOMY      HX OTHER SURGICAL      cholecystectomy          Family History:   Problem Relation Age of Onset    Diabetes Father     Hypertension Father     Stroke Father     Kidney Disease Father        Social History     Social History    Marital status: SINGLE     Spouse name: N/A    Number of children: N/A    Years of education: N/A     Occupational History    Not on file. Social History Main Topics    Smoking status: Never Smoker    Smokeless tobacco: Never Used    Alcohol use No    Drug use: No    Sexual activity: Yes     Birth control/ protection: None     Other Topics Concern    Not on file     Social History Narrative         ALLERGIES: Tramadol    Review of Systems   Constitutional: Negative.   Negative for activity change, appetite change, chills, diaphoresis, fever and unexpected weight change. HENT: Positive for dental problem. Negative for congestion, hearing loss, rhinorrhea, sinus pressure, sneezing, sore throat and trouble swallowing. Eyes: Negative for pain, redness, itching and visual disturbance. Respiratory: Negative for cough, shortness of breath and wheezing. Cardiovascular: Negative for chest pain, palpitations and leg swelling. Gastrointestinal: Negative for abdominal pain, constipation, diarrhea, nausea and vomiting. Genitourinary: Negative for dysuria. Musculoskeletal: Negative for arthralgias, gait problem and myalgias. Skin: Negative for color change, pallor, rash and wound. Neurological: Negative for tremors, weakness, light-headedness, numbness and headaches. All other systems reviewed and are negative. Vitals:    06/25/17 1743   BP: (!) 151/95   Pulse: (!) 102   Resp: 16   Temp: 99.2 °F (37.3 °C)   SpO2: 100%   Weight: 99.2 kg (218 lb 11.1 oz)   Height: 5' 5\" (1.651 m)            Physical Exam   Constitutional: She is oriented to person, place, and time. Vital signs are normal. She appears well-developed and well-nourished. No distress. 33 y.o.  female in NAD  Communicates appropriately and in full sentences   HENT:   Head: Normocephalic and atraumatic. Right Ear: External ear normal.   Left Ear: External ear normal.   Mouth/Throat: Oropharynx is clear and moist. No oropharyngeal exudate. No facial erythema, edema, gingival tenderness, bleeding, or fluctuance. Protruding wisdom teeth on left lower and upper side, no surrounding erythema, edema, or fluctuance. No trismus or stridor  Pt tolerating secretions  No uvula deviation  TMs NL   Eyes: Conjunctivae are normal. Pupils are equal, round, and reactive to light. Right eye exhibits no discharge. Left eye exhibits no discharge. Neck: Normal range of motion. Neck supple. No tracheal deviation present.    No nuchal rigidity or meningeal signs   Cardiovascular: Normal rate, regular rhythm, normal heart sounds and intact distal pulses. Pulmonary/Chest: No stridor. Abdominal: Bowel sounds are normal.   Musculoskeletal: Normal range of motion. She exhibits no edema, tenderness or deformity. No neurologic, motor, vascular, or compartment embarrassment observed on exam. No focal neurologic deficits. Lymphadenopathy:     She has no cervical adenopathy. Neurological: She is alert and oriented to person, place, and time. No cranial nerve deficit. Coordination normal.   Skin: Skin is warm and dry. No rash noted. She is not diaphoretic. No erythema. No pallor. Psychiatric: She has a normal mood and affect. Nursing note and vitals reviewed. MDM  Number of Diagnoses or Management Options  Atypical facial pain:   Dentalgia:   Diagnosis management comments: DDx: sialadenitis, dental caries, fractured tooth, poor compliance with regular dental follow-up, lack of dental insurance, trigeminal neuralgia, retropharyngeal abscess, Cullen's, Quinsy, need for wisdom teeth extraction. Patient presents with dental pain without facial swelling. No obvious abscess that needs drainage. No red flags that make PTA, RPA, ludwigs angina concerning. Pt's vital signs have been stable while in the ED [facility] ED. Will tx with cetacaine/benadryl/lidocaine cotton balls, antibiotics and outpatient analgesics. Given information on dentists and importance of followup. Pt expresses that they will be compliant with the discussed plan.         Amount and/or Complexity of Data Reviewed  Review and summarize past medical records: yes    Patient Progress  Patient progress: stable    ED Course       Procedures     I reviewed our electronic medical record system for any past medical records that were available that may contribute to the patients current condition, the nursing notes and and vital signs from today's visit     Nursing notes will be reviewed as they become available in realtime while the pt is in the ED.    The patients presenting problems have been discussed, and they are in agreement with the care plan formulated and outlined with them. I have encouraged them to ask questions as they arise throughout their visit. IMPRESSION:  1. Atypical facial pain    2. Dentalgia        PLAN:  1. Current Discharge Medication List      START taking these medications    Details   clindamycin (CLEOCIN) 150 mg capsule Take 1 Cap by mouth four (4) times daily for 7 days. Qty: 28 Cap, Refills: 0      ibuprofen (MOTRIN) 800 mg tablet Take 1 Tab by mouth every six (6) hours as needed for Pain for up to 7 days. Qty: 20 Tab, Refills: 0         CONTINUE these medications which have CHANGED    Details   HYDROcodone-acetaminophen (NORCO) 5-325 mg per tablet Take 1 Tab by mouth every four (4) hours as needed for Pain. Max Daily Amount: 6 Tabs. Qty: 15 Tab, Refills: 0           2. Follow-up Information     Follow up With Details Comments Contact Info    Edgar Barbosa NP Schedule an appointment as soon as possible for a visit in 2 days As needed, If symptoms worsen, Possible further evaluation and treatment [address]       [facility] EMERGENCY DEPT Go to As needed, If symptoms worsen [address]   [address]  [phone]        Return to ED if worse       Discharge Note:  6:08 PM   The pt is ready for discharge. The pt's signs, symptoms, diagnosis, and discharge instructions have been discussed and pt has conveyed their understanding. The pt is to follow up as recommended or return to ER should their symptoms worsen. Plan has been discussed and pt is in agreement. This note is prepared by Braxton Low, acting as a Scribe for [employer].     [employer]: The scribe's documentation has been prepared under my direction and personally reviewed by me in its entirety. I confirm that the notes above accurately reflects all work, treatment, procedures, and medical decision making performed by me.

## 2017-06-25 NOTE — ED NOTES
Discharge instructions given to patient by [address] [place] [address] Blvd. Pt verbalized understanding of discharge instructions. Pt discharged without difficulty. Pt discharged in stable condition via ambulation, accompanied by self.

## 2017-06-25 NOTE — LETTER
[address] 
[facility] EMERGENCY DEPT 
[address] [place] [address] [id number] [phone] Work/School Note Date: 6/25/2017 To Whom It May concern: 
 
Myles Amato was seen and treated today in the emergency room by the following provider(s): 
Attending Provider: Ghazala Beth MD 
Physician Assistant: Franklyn Toussaint. Brady Lyn. Myles Amato may return to work on 06/28/2017. Sincerely, 
 
 
 
 
Franklyn Toussaint.  Brady Lyn

## 2017-07-02 ENCOUNTER — HOSPITAL ENCOUNTER (EMERGENCY)
Age: 32
Discharge: HOME | End: 2017-07-02
Attending: EMERGENCY MEDICINE
Payer: SELF-PAY

## 2017-07-02 ENCOUNTER — APPOINTMENT (OUTPATIENT)
Dept: GENERAL RADIOLOGY | Age: 32
End: 2017-07-02
Attending: EMERGENCY MEDICINE
Payer: SELF-PAY

## 2017-07-02 VITALS
BODY MASS INDEX: 34.99 KG/M2 | SYSTOLIC BLOOD PRESSURE: 132 MMHG | TEMPERATURE: 98.3 F | HEART RATE: 84 BPM | WEIGHT: 210 LBS | HEIGHT: 65 IN | DIASTOLIC BLOOD PRESSURE: 74 MMHG | RESPIRATION RATE: 12 BRPM | OXYGEN SATURATION: 99 %

## 2017-07-02 DIAGNOSIS — S70.01XA CONTUSION OF RIGHT HIP, INITIAL ENCOUNTER: Primary | ICD-10-CM

## 2017-07-02 PROCEDURE — 74011250637 HC RX REV CODE- 250/637: Performed by: EMERGENCY MEDICINE

## 2017-07-02 PROCEDURE — 73552 X-RAY EXAM OF FEMUR 2/>: CPT

## 2017-07-02 PROCEDURE — 73502 X-RAY EXAM HIP UNI 2-3 VIEWS: CPT

## 2017-07-02 PROCEDURE — 99283 EMERGENCY DEPT VISIT LOW MDM: CPT

## 2017-07-02 RX ORDER — HYDROCODONE BITARTRATE AND ACETAMINOPHEN 10; 325 MG/1; MG/1
1 TABLET ORAL
Status: COMPLETED | OUTPATIENT
Start: 2017-07-02 | End: 2017-07-02

## 2017-07-02 RX ORDER — HYDROCODONE BITARTRATE AND ACETAMINOPHEN 5; 325 MG/1; MG/1
1 TABLET ORAL
Qty: 20 TAB | Refills: 0 | Status: SHIPPED | OUTPATIENT
Start: 2017-07-02 | End: 2017-07-08

## 2017-07-02 RX ADMIN — HYDROCODONE BITARTRATE AND ACETAMINOPHEN 1 TABLET: 10; 325 TABLET ORAL at 01:37

## 2017-07-02 NOTE — ED PROVIDER NOTES
HPI Comments: Ginette Morales is a 33 y.o. female with hx of DM who presents ambulatory to the ED c/o sharp R hip pain radiating down to knee s/p fall. Pt reports she was playing kickball when she slid and fell on her R side. Pt states she was initially able to ambulate but reports difficulty ambulating independently upon coming home. She states she ambulated with crutches to ED. Pt denies sore throat, cough, CP, SOB, fever, chills, and N/V/D. Social hx: - Tobacco use, - EtOH use, - Illicit drug use    PCP: TAMMY BACON NP    There are no other complaints, changes or physical findings at this time. The history is provided by the patient. No  was used. Past Medical History:   Diagnosis Date    Abnormal Pap smear     abnormal pap at 15 y/o normal since     [facility] pain 2015    [facility]     Diabetes mellitus     Gestational Diabetes with last two pregnancies-diet controlled       Past Surgical History:   Procedure Laterality Date    HX  SECTION      HX CHOLECYSTECTOMY      HX OTHER SURGICAL      cholecystectomy          Family History:   Problem Relation Age of Onset    Diabetes Father     Hypertension Father     Stroke Father     Kidney Disease Father        Social History     Social History    Marital status: SINGLE     Spouse name: N/A    Number of children: N/A    Years of education: N/A     Occupational History    Not on file. Social History Main Topics    Smoking status: Never Smoker    Smokeless tobacco: Never Used    Alcohol use No    Drug use: No    Sexual activity: Yes     Birth control/ protection: None     Other Topics Concern    Not on file     Social History Narrative         ALLERGIES: Tramadol    Review of Systems   Constitutional: Negative for fatigue and fever. HENT: Negative. Eyes: Negative. Respiratory: Negative for shortness of breath and wheezing.     Cardiovascular: Negative for chest pain and leg swelling. Gastrointestinal: Negative for blood in stool, constipation, diarrhea, nausea and vomiting. Endocrine: Negative. Genitourinary: Negative for difficulty urinating and dysuria. Musculoskeletal: Positive for arthralgias (R hip pain radiating down to knee). Skin: Negative for rash. Allergic/Immunologic: Negative. Neurological: Negative for weakness and numbness. Hematological: Negative. Psychiatric/Behavioral: Negative. Vitals:    07/02/17 0020   BP: (!) 140/95   Pulse: 88   Resp: 16   Temp: 98.3 °F (36.8 °C)   SpO2: 99%   Weight: 95.3 kg (210 lb)   Height: 5' 5\" (1.651 m)            Physical Exam   Constitutional: She is oriented to person, place, and time. She appears well-developed and well-nourished. No distress. HENT:   Head: Normocephalic and atraumatic. Mouth/Throat: Oropharynx is clear and moist.   Eyes: Conjunctivae and EOM are normal.   Neck: Neck supple. No JVD present. No tracheal deviation present. Cardiovascular: Normal rate, regular rhythm and intact distal pulses. Exam reveals no gallop and no friction rub. No murmur heard. Pulmonary/Chest: Effort normal and breath sounds normal. No stridor. No respiratory distress. She has no wheezes. Abdominal: Soft. Bowel sounds are normal. She exhibits no distension and no mass. There is no tenderness. There is no guarding. Musculoskeletal: Normal range of motion. She exhibits no edema or tenderness. TTP over R lateral posterior hip and R laterla thigh. No ecchymosis. No deformity. No swelling. No skin lesions. Full ROM of R knee. Neurological: She is alert and oriented to person, place, and time. She has normal strength. Sensation intact right lower extremities. Skin: Skin is warm, dry and intact. No rash noted. Psychiatric: She has a normal mood and affect. Her behavior is normal. Judgment and thought content normal.   Nursing note and vitals reviewed.        MDM  Number of Diagnoses or Management Options  Contusion of right hip, initial encounter:   Diagnosis management comments: DDx: contusion, sprain, strain, fracture, dislocation       Amount and/or Complexity of Data Reviewed  Tests in the radiology section of CPT®: ordered and reviewed  Review and summarize past medical records: yes    Patient Progress  Patient progress: stable    ED Course       Procedures           LABORATORY TESTS:  No results found for this or any previous visit (from the past 12 hour(s)). IMAGING RESULTS:  XR FEMUR RT 2 VS   Final Result      XR HIP RT W OR WO PELV 2-3 VWS   Final Result          MEDICATIONS GIVEN:  Medications   HYDROcodone-acetaminophen (NORCO)  mg tablet 1 Tab (1 Tab Oral Given 7/2/17 0137)       IMPRESSION:  1. Contusion of right hip, initial encounter        PLAN:  1. Discharge Medication List as of 7/2/2017  3:05 AM        2.    Follow-up Information     Follow up With Details Comments Contact Info    Georgia Ann NP Schedule an appointment as soon as possible for a visit  [address] [facility]  [phone]      [facility] EMERGENCY DEPT  As needed, If symptoms worsen [address] [place] [address]  [phone]        Return to ED if worse

## 2017-07-02 NOTE — ED NOTES
MD Prince Nelsons at bedside to discharge patient from ED to home with family. Education regarding follow up care, discharge paperwork and medications done.

## 2017-07-08 ENCOUNTER — HOSPITAL ENCOUNTER (EMERGENCY)
Age: 32
Discharge: HOME | End: 2017-07-08
Attending: EMERGENCY MEDICINE
Payer: SELF-PAY

## 2017-07-08 VITALS
DIASTOLIC BLOOD PRESSURE: 93 MMHG | HEART RATE: 88 BPM | RESPIRATION RATE: 15 BRPM | OXYGEN SATURATION: 99 % | SYSTOLIC BLOOD PRESSURE: 136 MMHG | TEMPERATURE: 98.6 F | HEIGHT: 65 IN | BODY MASS INDEX: 35.99 KG/M2 | WEIGHT: 216 LBS

## 2017-07-08 DIAGNOSIS — K08.89 PAIN, DENTAL: ICD-10-CM

## 2017-07-08 DIAGNOSIS — K04.7 DENTAL ABSCESS: Primary | ICD-10-CM

## 2017-07-08 PROCEDURE — 74011250637 HC RX REV CODE- 250/637: Performed by: NURSE PRACTITIONER

## 2017-07-08 PROCEDURE — 74011000250 HC RX REV CODE- 250: Performed by: NURSE PRACTITIONER

## 2017-07-08 PROCEDURE — 99283 EMERGENCY DEPT VISIT LOW MDM: CPT

## 2017-07-08 RX ORDER — IBUPROFEN 600 MG/1
600 TABLET ORAL
Qty: 15 TAB | Refills: 0 | Status: SHIPPED | OUTPATIENT
Start: 2017-07-08 | End: 2017-08-19

## 2017-07-08 RX ORDER — AMOXICILLIN 875 MG/1
875 TABLET, FILM COATED ORAL 2 TIMES DAILY
Qty: 20 TAB | Refills: 0 | Status: SHIPPED | OUTPATIENT
Start: 2017-07-08 | End: 2017-07-18

## 2017-07-08 RX ADMIN — LIDOCAINE HYDROCHLORIDE: 20 SOLUTION ORAL; TOPICAL at 12:24

## 2017-07-08 NOTE — ED NOTES
Dental ball given with instruction. Dc instructions given. Pt able to restate dc instructions. All questions answered. pt stable for discharge.

## 2017-07-08 NOTE — ED PROVIDER NOTES
HPI Comments: 34 yo F with hx of DM, back pain, dental pain (see list) presents ambulatory to the ED for evaluation of L lower dental pain with discomfort when attempting to close her mouth. Denies fever, chills, nausea, vomiting, trauma or injury. Has been seen multiple times for dental issues but states she does not have dentist or dental insurance and needs to schedule an appt with [facility]. Past Medical History:  No date: Abnormal Pap smear      Comment: abnormal pap at 13 y/o normal since   2015: Back pain      Comment: [facility]  emergency [facility]  No date: Diabetes ([facility] [address]  No date: Diabetes mellitus      Comment: Gestational Diabetes with last two                pregnancies-diet controlled    Social History    Marital status: SINGLE              Spouse name:                       Years of education:                 Number of children:               Occupational History    None on file    Social History Main Topics    Smoking status: Never Smoker                                                                Smokeless status: Never Used                        Alcohol use: No              Drug use: No              Sexual activity: Yes                     Birth control/protection: None    Other Topics            Concern    None on file    Social History Narrative    None on file          Patient is a 33 y.o. female presenting with dental problem. The history is provided by the patient. No  was used.    Dental Pain             Past Medical History:   Diagnosis Date    Abnormal Pap smear     abnormal pap at 13 y/o normal since     Back pain 2015    [facility]  emergency [facility]    Diabetes [facility])     Diabetes mellitus     Gestational Diabetes with last two pregnancies-diet controlled       Past Surgical History:   Procedure Laterality Date    HX  SECTION      HX CHOLECYSTECTOMY      HX OTHER SURGICAL      cholecystectomy          Family History:   Problem Relation Age of Onset    Diabetes Father     Hypertension Father     Stroke Father     Kidney Disease Father        Social History     Social History    Marital status: SINGLE     Spouse name: N/A    Number of children: N/A    Years of education: N/A     Occupational History    Not on file. Social History Main Topics    Smoking status: Never Smoker    Smokeless tobacco: Never Used    Alcohol use No    Drug use: No    Sexual activity: Yes     Birth control/ protection: None     Other Topics Concern    Not on file     Social History Narrative         ALLERGIES: Tramadol    Review of Systems   Constitutional: Negative for chills and fever. HENT: Positive for dental problem. Negative for congestion and facial swelling. Eyes: Negative for discharge. Respiratory: Negative for cough and shortness of breath. Cardiovascular: Negative for chest pain and leg swelling. Gastrointestinal: Negative for diarrhea, nausea and vomiting. Musculoskeletal: Negative for neck pain. Skin: Negative for color change. Neurological: Negative for seizures and facial asymmetry. Psychiatric/Behavioral: Negative for confusion. Vitals:    07/08/17 1127   BP: (!) 136/93   Pulse: 88   Resp: 15   Temp: 98.6 °F (37 °C)   SpO2: 99%   Weight: 98 kg (216 lb)   Height: 5' 5\" (1.651 m)            Physical Exam   Constitutional: She is oriented to person, place, and time. She appears well-developed and well-nourished. No distress. HENT:   Head: Normocephalic and atraumatic. Mouth/Throat: Dental abscesses and dental caries present. Eyes: Conjunctivae and EOM are normal. Pupils are equal, round, and reactive to light. Neck: Normal range of motion. Neck supple. Cardiovascular: Normal rate, regular rhythm, normal heart sounds and intact distal pulses. Pulmonary/Chest: Effort normal and breath sounds normal.   No evidence of SOB. Abdominal: Soft.  Bowel sounds are normal.   Soft, nontender    Musculoskeletal: Normal range of motion. Neurological: She is alert and oriented to person, place, and time. She has normal strength. No cranial nerve deficit or sensory deficit. Coordination normal. GCS eye subscore is 4. GCS verbal subscore is 5. GCS motor subscore is 6. Skin: Skin is warm and dry. Psychiatric: She has a normal mood and affect. Her behavior is normal. Judgment and thought content normal.   Nursing note and vitals reviewed. MDM  Number of Diagnoses or Management Options  Dental abscess:   Pain, dental:   Diagnosis management comments: 34 yo F with L lower molar dental pain and swelling. Has been evaluated in the ED multiple times for dental issues and recommended that she FU with dentist which pt states she has not been able to do because she does \"not have the proper insurance\" and work schedule has not allowed time off. She denies fever, chills, facial swelling.  + evidence of dental abscess with erythema, tenderness to L posterior molar. Dental balls provided, pt discharged with antibiotics, ibuprofen for discomfort with dental clinic information included in discharge instructions.       Patient Progress  Patient progress: stable    ED Course       Procedures

## 2017-07-08 NOTE — PROGRESS NOTES
Prior to Admission Medications   Prescriptions Last Dose Informant Patient Reported? Taking?   glipiZIDE (GLUCOTROL) 5 mg tablet 7/8/2017 at 0900  Yes Yes   Sig: Take 5 mg by mouth daily. hydroCHLOROthiazide (HYDRODIURIL) 12.5 mg tablet 7/8/2017 at 0900  No Yes   Sig: Take 1 Tab by mouth daily. metFORMIN (GLUCOPHAGE) 1,000 mg tablet 7/8/2017 at 0900  Yes Yes   Sig: Take 1,000 mg by mouth daily. Facility-Administered Medications: None   Self: List verified per patient interview. No changes needed.

## 2017-07-08 NOTE — DISCHARGE INSTRUCTIONS
Tooth and Gum Pain: Care Instructions  Your Care Instructions    The most common causes of dental pain are tooth decay and gum disease. Pain can also be caused by an infection of the tooth (abscess) or the gums. Or you may have pain from a broken or cracked tooth. Other causes of pain include infection and damage to a tooth from nervous grinding of your teeth. A wisdom tooth can be painful when it is coming in but cannot break through the gum. It can also be painful when the tooth is only partway in and extra gum tissue has formed around it. The tissue can get inflamed (pericoronitis), and sometimes it gets infected. Prompt dental care can help find the cause of your toothache and keep the tooth from dying or gum disease from getting worse. Self-care at home may reduce your pain and discomfort. Follow-up care is a key part of your treatment and safety. Be sure to make and go to all appointments, and call your dentist or doctor if you are having problems. It's also a good idea to know your test results and keep a list of the medicines you take. How can you care for yourself at home? · To reduce pain and facial swelling, put an ice or cold pack on the outside of your cheek for 10 to 20 minutes at a time. Put a thin cloth between the ice and your skin. Do not use heat. · If your doctor prescribed antibiotics, take them as directed. Do not stop taking them just because you feel better. You need to take the full course of antibiotics. · Ask your doctor if you can take an over-the-counter pain medicine, such as acetaminophen (Tylenol), ibuprofen (Advil, Motrin), or naproxen (Aleve). Be safe with medicines. Read and follow all instructions on the label. · Avoid very hot, cold, or sweet foods and drinks if they increase your pain. · Rinse your mouth with warm salt water every 2 hours to help relieve pain and swelling. Mix 1 teaspoon of salt in 8 ounces of water.   · Talk to your dentist about using special toothpaste for sensitive teeth. To reduce pain on contact with heat or cold or when brushing, brush with this toothpaste regularly or rub a small amount of the paste on the sensitive area with a clean finger 2 or 3 times a day. Floss gently between your teeth. · Do not smoke or use spit tobacco. Tobacco use can make gum problems worse, decreases your ability to fight infection in your gums, and delays healing. If you need help quitting, talk to your doctor about stop-smoking programs and medicines. These can increase your chances of quitting for good. When should you call for help? Call 911 anytime you think you may need emergency care. For example, call if:  · You have trouble breathing. Call your dentist or doctor now or seek immediate medical care if:  · You have signs of infection, such as:  ¨ Increased pain, swelling, warmth, or redness. ¨ Red streaks leading from the area. ¨ Pus draining from the area. ¨ A fever. Watch closely for changes in your health, and be sure to contact your doctor if:  · You do not get better as expected. Where can you learn more? Go to http://asaelEnerVaultsloan.info/. Enter [id number] [id number] in the search box to learn more about \"Tooth and Gum Pain: Care Instructions. \"  Current as of: August 11, 2016  Content Version: 11.3  © 4052-2842 [employer]. Care instructions adapted under license by [employer] (which disclaims liability or warranty for this information). If you have questions about a medical condition or this instruction, always ask your healthcare professional. [place] [address] any warranty or liability for your use of this information. Head or Face Pain in Children: Care Instructions  Your Care Instructions  Common causes of head or face pain are allergies, stress, and injuries. Other causes include tooth problems and sinus infections.  Eating certain foods, such as chocolate or cheese, or drinking certain liquids, such as cola, can cause head pain for some children. If your child has mild head pain, he or she may not need treatment. It is important to watch your child's symptoms and talk to your doctor if the pain continues or gets worse. Follow-up care is a key part of your child's treatment and safety. Be sure to make and go to all appointments, and call your doctor if your child is having problems. It's also a good idea to know your child's test results and keep a list of the medicines your child takes. How can you care for your child at home? · Be safe with medicines. Give pain medicines exactly as directed. ¨ If the doctor gave your child a prescription medicine for pain, give it as prescribed. ¨ If your child is not taking a prescription pain medicine, ask your doctor if he or she can take an over-the-counter pain medicine. ¨ Do not give aspirin to anyone younger than 20. It has been linked to Reye syndrome, a serious illness. · Have your child take it easy for the next few days or longer if he or she is not feeling well. · Use a warm, moist towel to relax tight muscles in your child's shoulder and neck. Gently massage your child's neck and shoulders. · Put ice or a cold pack on the area for 10 to 20 minutes at a time. Put a thin cloth between the ice and your child's skin. When should you call for help? Call 911 anytime you think your child may need emergency care. For example, call if:  · Your child has twitching, jerking, or a seizure. · Your child passes out (loses consciousness). · Your child has general weakness, including new problems with walking or balance. · Your child has a sudden, severe headache that is different from past headaches. Call your doctor now or seek immediate medical care if:  · Your child has a fever with a stiff neck or a severe headache. · Your child has nausea and vomiting. · Your child cannot keep food or liquids down.   Watch closely for changes in your child's health, and be sure to contact your doctor if:  · Your child's head or face pain does not get better as expected. Where can you learn more? Go to http://asael-sloan.info/. Enter A876 in the search box to learn more about \"Head or Face Pain in Children: Care Instructions. \"  Current as of: March 20, 2017  Content Version: 11.3  © 8614-3364 [employer]. Care instructions adapted under license by [employer] (which disclaims liability or warranty for this information). If you have questions about a medical condition or this instruction, always ask your healthcare professional. [place] [address] any warranty or liability for your use of this information. Abscessed Tooth: Care Instructions  Your Care Instructions    An abscessed tooth is a tooth that has a pocket of pus in the tissues around it. Pus forms when the body tries to fight an infection caused by bacteria. If the pus cannot drain, it forms an abscess. An abscessed tooth can cause red, swollen gums and throbbing pain, especially when you chew. You may have a bad taste in your mouth and a fever, and your jaw may swell. Damage to the tooth, untreated tooth decay, or gum disease can cause an abscessed tooth. An abscessed tooth needs to be treated by a dental professional right away. If it is not treated, the infection could spread to other parts of your body. Your dentist will give you antibiotics to stop the infection. He or she may make a hole in the tooth or cut open (anshul) the abscess inside your mouth so that the infection can drain, which should relieve your pain. You may need to have a root canal treatment, which tries to save your tooth by taking out the infected pulp and replacing it with a healing medicine and/or a filling. If these treatments do not work, your tooth may have to be removed. Follow-up care is a key part of your treatment and safety.  Be sure to make and go to all appointments, and call your doctor if you are having problems. It's also a good idea to know your test results and keep a list of the medicines you take. How can you care for yourself at home? · Reduce pain and swelling in your face and jaw by putting ice or a cold pack on the outside of your cheek for 10 to 20 minutes at a time. Put a thin cloth between the ice and your skin. · Take pain medicines exactly as directed. ¨ If the doctor gave you a prescription medicine for pain, take it as prescribed. ¨ If you are not taking a prescription pain medicine, ask your doctor if you can take an over-the-counter medicine. · Take your antibiotics as directed. Do not stop taking them just because you feel better. You need to take the full course of antibiotics. To prevent tooth abscess  · Brush and floss every day, and have regular dental checkups. · Eat a healthy diet, and avoid sugary foods and drinks. · Do not smoke, use e-cigarettes with nicotine, or use spit tobacco. Tobacco and nicotine slow your ability to heal. Tobacco also increases your risk for gum disease and cancer of the mouth and throat. If you need help quitting, talk to your doctor about stop-smoking programs and medicines. These can increase your chances of quitting for good. When should you call for help? Call 911 anytime you think you may need emergency care. For example, call if:  · You have trouble breathing. Call your doctor now or seek immediate medical care if:  · You have new or worse symptoms of infection, such as:  ¨ Increased pain, swelling, warmth, or redness. ¨ Red streaks leading from the area. ¨ Pus draining from the area. ¨ A fever. Watch closely for changes in your health, and be sure to contact your doctor if:  · You do not get better as expected. Where can you learn more? Go to http://asael-sloan.info/. Enter R977 in the search box to learn more about \"Abscessed Tooth: Care Instructions. \"  Current as of: September 19, 2016  Content Version: 11.3  © 0869-8226 [employer]. Care instructions adapted under license by [employer] (which disclaims liability or warranty for this information). If you have questions about a medical condition or this instruction, always ask your healthcare professional. [address] any warranty or liability for your use of this information. We hope that we have addressed all of your medical concerns. The examination and treatment you received in the Emergency Department were for an emergent problem and were not intended as complete care. It is important that you follow up with your healthcare provider(s) for ongoing care. If your symptoms worsen or do not improve as expected, and you are unable to reach your usual health care provider(s), you should return to the Emergency Department. Today's healthcare is undergoing tremendous change, and patient satisfaction surveys are one of the many tools to assess the quality of medical care. You may receive a survey from the [employer] organization regarding your experience in the Emergency Department. I hope that your experience has been completely positive, particularly the medical care that I provided. As such, please participate in the survey; anything less than excellent does not meet my expectations or intentions. [address] [facility] and [address] [place] [address] participate in nationally recognized quality of care measures. If your blood pressure is greater than 120/80, as reported below, we urge that you seek medical care to address the potential of high blood pressure, commonly known as hypertension. Hypertension can be hereditary or can be caused by certain medical conditions, pain, stress, or \"white coat syndrome. \"       Please make an appointment with your health care provider(s) for follow up of your Emergency Department visit.        VITALS: Patient Vitals for the past 8 hrs:   Temp Pulse Resp BP SpO2   07/08/17 1127 98.6 °F (37 °C) 88 15 (!) 136/93 99 %          Thank you for allowing us to provide you with medical care today. We realize that you have many choices for your emergency care needs. Please choose us in the future for any continued health care needs. Lubna Woodson, [address] [facility] [address]: [phone]            No results found for this or any previous visit (from the past 24 hour(s)). No results found.

## 2017-07-23 ENCOUNTER — APPOINTMENT (OUTPATIENT)
Dept: GENERAL RADIOLOGY | Age: 32
End: 2017-07-23
Attending: PHYSICIAN ASSISTANT
Payer: SELF-PAY

## 2017-07-23 ENCOUNTER — HOSPITAL ENCOUNTER (EMERGENCY)
Age: 32
Discharge: HOME | End: 2017-07-23
Attending: EMERGENCY MEDICINE | Admitting: EMERGENCY MEDICINE
Payer: SELF-PAY

## 2017-07-23 VITALS
BODY MASS INDEX: 35.48 KG/M2 | SYSTOLIC BLOOD PRESSURE: 150 MMHG | DIASTOLIC BLOOD PRESSURE: 96 MMHG | OXYGEN SATURATION: 99 % | TEMPERATURE: 98.6 F | RESPIRATION RATE: 18 BRPM | HEIGHT: 65 IN | WEIGHT: 212.96 LBS | HEART RATE: 111 BPM

## 2017-07-23 DIAGNOSIS — M54.41 ACUTE RIGHT-SIDED LOW BACK PAIN WITH RIGHT-SIDED SCIATICA: ICD-10-CM

## 2017-07-23 DIAGNOSIS — M54.31 SCIATICA OF RIGHT SIDE: ICD-10-CM

## 2017-07-23 DIAGNOSIS — R07.89 CHEST WALL PAIN: ICD-10-CM
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
## 2017-07-23 PROCEDURE — 71101 X-RAY EXAM UNILAT RIBS/CHEST: CPT

## 2017-07-23 PROCEDURE — 99282 EMERGENCY DEPT VISIT SF MDM: CPT

## 2017-07-23 PROCEDURE — 72100 X-RAY EXAM L-S SPINE 2/3 VWS: CPT

## 2017-07-23 RX ORDER — OXYCODONE AND ACETAMINOPHEN 5; 325 MG/1; MG/1
1 TABLET ORAL
Qty: 10 TAB | Refills: 0 | Status: SHIPPED | OUTPATIENT
Start: 2017-07-23 | End: 2017-08-10

## 2017-07-23 RX ORDER — METHOCARBAMOL 750 MG/1
750 TABLET, FILM COATED ORAL 4 TIMES DAILY
Qty: 15 TAB | Refills: 0 | Status: SHIPPED | OUTPATIENT
Start: 2017-07-23 | End: 2017-08-26

## 2017-07-23 NOTE — DISCHARGE INSTRUCTIONS
Back Pain: Care Instructions  Your Care Instructions    Back pain has many possible causes. It is often related to problems with muscles and ligaments of the back. It may also be related to problems with the nerves, discs, or bones of the back. Moving, lifting, standing, sitting, or sleeping in an awkward way can strain the back. Sometimes you don't notice the injury until later. Arthritis is another common cause of back pain. Although it may hurt a lot, back pain usually improves on its own within several weeks. Most people recover in 12 weeks or less. Using good home treatment and being careful not to stress your back can help you feel better sooner. Follow-up care is a key part of your treatment and safety. Be sure to make and go to all appointments, and call your doctor if you are having problems. Its also a good idea to know your test results and keep a list of the medicines you take. How can you care for yourself at home? · Sit or lie in positions that are most comfortable and reduce your pain. Try one of these positions when you lie down:  ¨ Lie on your back with your knees bent and supported by large pillows. ¨ Lie on the floor with your legs on the seat of a sofa or chair. Tiffany Raul on your side with your knees and hips bent and a pillow between your legs. ¨ Lie on your stomach if it does not make pain worse. · Do not sit up in bed, and avoid soft couches and twisted positions. Bed rest can help relieve pain at first, but it delays healing. Avoid bed rest after the first day of back pain. · Change positions every 30 minutes. If you must sit for long periods of time, take breaks from sitting. Get up and walk around, or lie in a comfortable position. · Try using a heating pad on a low or medium setting for 15 to 20 minutes every 2 or 3 hours. Try a warm shower in place of one session with the heating pad. · You can also try an ice pack for 10 to 15 minutes every 2 to 3 hours.  Put a thin cloth between the ice pack and your skin. · Take pain medicines exactly as directed. ¨ If the doctor gave you a prescription medicine for pain, take it as prescribed. ¨ If you are not taking a prescription pain medicine, ask your doctor if you can take an over-the-counter medicine. · Take short walks several times a day. You can start with 5 to 10 minutes, 3 or 4 times a day, and work up to longer walks. Walk on level surfaces and avoid hills and stairs until your back is better. · Return to work and other activities as soon as you can. Continued rest without activity is usually not good for your back. · To prevent future back pain, do exercises to stretch and strengthen your back and stomach. Learn how to use good posture, safe lifting techniques, and proper body mechanics. When should you call for help? Call your doctor now or seek immediate medical care if:  · You have new or worsening numbness in your legs. · You have new or worsening weakness in your legs. (This could make it hard to stand up.)  · You lose control of your bladder or bowels. Watch closely for changes in your health, and be sure to contact your doctor if:  · Your pain gets worse. · You are not getting better after 2 weeks. Where can you learn more? Go to http://asael-sloan.info/. Enter U300 in the search box to learn more about \"Back Pain: Care Instructions. \"  Current as of: March 21, 2017  Content Version: 11.3  © 8869-3923 [employer]. Care instructions adapted under license by [employer] (which disclaims liability or warranty for this information). If you have questions about a medical condition or this instruction, always ask your healthcare professional. [place] [address] any warranty or liability for your use of this information.          Musculoskeletal Chest Pain: Care Instructions  Your Care Instructions  Chest pain is not always a sign that something is wrong with your heart or that you have another serious problem. The doctor thinks your chest pain is caused by strained muscles or ligaments, inflamed chest cartilage, or another problem in your chest, rather than by your heart. You may need more tests to find the cause of your chest pain. Follow-up care is a key part of your treatment and safety. Be sure to make and go to all appointments, and call your doctor if you are having problems. Its also a good idea to know your test results and keep a list of the medicines you take. How can you care for yourself at home? · Take pain medicines exactly as directed. ¨ If the doctor gave you a prescription medicine for pain, take it as prescribed. ¨ If you are not taking a prescription pain medicine, ask your doctor if you can take an over-the-counter medicine. · Rest and protect the sore area. · Stop, change, or take a break from any activity that may be causing your pain or soreness. · Put ice or a cold pack on the sore area for 10 to 20 minutes at a time. Try to do this every 1 to 2 hours for the next 3 days (when you are awake) or until the swelling goes down. Put a thin cloth between the ice and your skin. · After 2 or 3 days, apply a heating pad set on low or a warm cloth to the area that hurts. Some doctors suggest that you go back and forth between hot and cold. · Do not wrap or tape your ribs for support. This may cause you to take smaller breaths, which could increase your risk of lung problems. · Mentholated creams such as Bengay or Icy Hot may soothe sore muscles. Follow the instructions on the package. · Follow your doctor's instructions for exercising. · Gentle stretching and massage may help you get better faster. Stretch slowly to the point just before pain begins, and hold the stretch for at least 15 to 30 seconds. Do this 3 or 4 times a day. Stretch just after you have applied heat. · As your pain gets better, slowly return to your normal activities.  Any increased pain may be a sign that you need to rest a while longer. When should you call for help? Call 911 anytime you think you may need emergency care. For example, call if:  · You have chest pain or pressure. This may occur with:  ¨ Sweating. ¨ Shortness of breath. ¨ Nausea or vomiting. ¨ Pain that spreads from the chest to the neck, jaw, or one or both shoulders or arms. ¨ Dizziness or lightheadedness. ¨ A fast or uneven pulse. After calling 911, chew 1 adult-strength aspirin. Wait for an ambulance. Do not try to drive yourself. · You have sudden chest pain and shortness of breath, or you cough up blood. Call your doctor now or seek immediate medical care if:  · You have any trouble breathing. · Your chest pain gets worse. · Your chest pain occurs consistently with exercise and is relieved by rest.  Watch closely for changes in your health, and be sure to contact your doctor if:  · Your chest pain does not get better after 1 week. Where can you learn more? Go to http://asael-sloan.info/. Enter V293 in the search box to learn more about \"Musculoskeletal Chest Pain: Care Instructions. \"  Current as of: March 20, 2017  Content Version: 11.3  © 8123-3879 [employer]. Care instructions adapted under license by [facility] (which disclaims liability or warranty for this information). If you have questions about a medical condition or this instruction, always ask your healthcare professional. [place] [address] any warranty or liability for your use of this information. Preventing Falls: Care Instructions  Your Care Instructions  Getting around your home safely can be a challenge if you have injuries or health problems that make it easy for you to fall. Loose rugs and furniture in walkways are among the dangers for many older people who have problems walking or who have poor eyesight.  People who have conditions such as arthritis, osteoporosis, or dementia also have to be careful not to fall. You can make your home safer with a few simple measures. Follow-up care is a key part of your treatment and safety. Be sure to make and go to all appointments, and call your doctor if you are having problems. It's also a good idea to know your test results and keep a list of the medicines you take. How can you care for yourself at home? Taking care of yourself  · You may get dizzy if you do not drink enough water. To prevent dehydration, drink plenty of fluids, enough so that your urine is light yellow or clear like water. Choose water and other caffeine-free clear liquids. If you have kidney, heart, or liver disease and have to limit fluids, talk with your doctor before you increase the amount of fluids you drink. · Exercise regularly to improve your strength, muscle tone, and balance. Walk if you can. Swimming may be a good choice if you cannot walk easily. · Have your vision and hearing checked each year or any time you notice a change. If you have trouble seeing and hearing, you might not be able to avoid objects and could lose your balance. · Know the side effects of the medicines you take. Ask your doctor or pharmacist whether the medicines you take can affect your balance. Sleeping pills or sedatives can affect your balance. · Limit the amount of alcohol you drink. Alcohol can impair your balance and other senses. · Ask your doctor whether calluses or corns on your feet need to be removed. If you wear loose-fitting shoes because of calluses or corns, you can lose your balance and fall. · Talk to your doctor if you have numbness in your feet. Preventing falls at home  · Remove raised doorway thresholds, throw rugs, and clutter. Repair loose carpet or raised areas in the floor. · Move furniture and electrical cords to keep them out of walking paths.   · Use nonskid floor wax, and wipe up spills right away, especially on ceramic tile floors. · If you use a walker or cane, put rubber tips on it. If you use crutches, clean the bottoms of them regularly with an abrasive pad, such as steel wool. · Keep your house well lit, especially [place], and outside walkways. Use night-lights in areas such as hallways and bathrooms. Add extra light switches or use remote switches (such as switches that go on or off when you clap your hands) to make it easier to turn lights on if you have to get up during the night. · Install sturdy handrails on stairways. · Move items in your cabinets so that the things you use a lot are on the lower shelves (about waist level). · Keep a cordless phone and a flashlight with new batteries by your bed. If possible, put a phone in each of the main rooms of your house, or carry a cell phone in case you fall and cannot reach a phone. Or, you can wear a device around your neck or wrist. You push a button that sends a signal for help. · Wear low-heeled shoes that fit well and give your feet good support. Use footwear with nonskid soles. Check the heels and soles of your shoes for wear. Repair or replace worn heels or soles. · Do not wear socks without shoes on wood floors. · Walk on the grass when the sidewalks are slippery. If you live in an area that gets snow and ice in the winter, sprinkle salt on slippery steps and sidewalks. Preventing falls in the bath  · Install grab bars and nonskid mats inside and outside your shower or tub and near the toilet and sinks. · Use shower chairs and bath benches. · Use a hand-held shower head that will allow you to sit while showering. · Get into a tub or shower by putting the weaker leg in first. Get out of a tub or shower with your strong side first.  · Repair loose toilet seats and consider installing a raised toilet seat to make getting on and off the toilet easier. · Keep your bathroom door unlocked while you are in the shower. Where can you learn more?   Go to http://asael-sloan.info/. Enter 0476 [id number] 69 [id number] in the search box to learn more about \"Preventing Falls: Care Instructions. \"  Current as of: August 4, 2016  Content Version: 11.3  © 4040-8216 [employer]. Care instructions adapted under license by [employer] (which disclaims liability or warranty for this information). If you have questions about a medical condition or this instruction, always ask your healthcare professional. [address] any warranty or liability for your use of this information.

## 2017-07-23 NOTE — ED PROVIDER NOTES
HPI Comments: Cary Farooq is a 27 y.o. female with PMhx significant for DM and sciatica who presents ambulatory to the ED with cc of worsening upper and lower back pain after falling 6 feet from a ladder on 2017. She states the leg of the ladder gave out from under her causing her to land on her back/bottom on a tile/linoleum floor. Pt indicates her pain is exacerbated with inspiration. She denies any chance of pregnancy secondary to Depo-shots. Pt denies any tobacco, EtOH, and illicit drug use. Pt specifically denies any hemoptysis. PCP: Ludmila Ojeda, NP    There are no other complaints, changes or physical findings at this time. The history is provided by the patient. Past Medical History:   Diagnosis Date    Abnormal Pap smear     abnormal pap at 15 y/o normal since     [facility] pain 2015    [facility]     Diabetes mellitus     Gestational Diabetes with last two pregnancies-diet controlled       Past Surgical History:   Procedure Laterality Date    HX  SECTION      HX CHOLECYSTECTOMY      HX OTHER SURGICAL      cholecystectomy          Family History:   Problem Relation Age of Onset    Diabetes Father     Hypertension Father     Stroke Father     Kidney Disease Father        Social History     Social History    Marital status: SINGLE     Spouse name: N/A    Number of children: N/A    Years of education: N/A     Occupational History    Not on file. Social History Main Topics    Smoking status: Never Smoker    Smokeless tobacco: Never Used    Alcohol use No    Drug use: No    Sexual activity: Yes     Birth control/ protection: None     Other Topics Concern    Not on file     Social History Narrative         ALLERGIES: Tramadol    Review of Systems   Constitutional: Negative. HENT: Negative. Eyes: Negative. Respiratory: Negative. - hemoptysis   Cardiovascular: Negative. Gastrointestinal: Negative. Genitourinary: Negative. Musculoskeletal: Positive for back pain (upper;lower). Skin: Negative. Neurological: Negative. All other systems reviewed and are negative. Vitals:    07/23/17 1013   BP: (!) 150/96   Pulse: (!) 111   Resp: 18   Temp: 98.6 °F (37 °C)   SpO2: 99%   Weight: 96.6 kg (212 lb 15.4 oz)   Height: 5' 5\" (1.651 m)            Physical Exam   Constitutional: She is oriented to person, place, and time. She appears well-developed and well-nourished. No distress. HENT:   Head: Normocephalic and atraumatic. Right Ear: External ear normal.   Left Ear: External ear normal.   Nose: Nose normal.   Mouth/Throat: Oropharynx is clear and moist. No oropharyngeal exudate. Eyes: Conjunctivae and EOM are normal. Pupils are equal, round, and reactive to light. Right eye exhibits no discharge. Left eye exhibits no discharge. No scleral icterus. Neck: Normal range of motion. Neck supple. No JVD present. No tracheal deviation present. Cardiovascular: Normal rate, regular rhythm, normal heart sounds and intact distal pulses. Exam reveals no gallop and no friction rub. No murmur heard. Pulmonary/Chest: Effort normal and breath sounds normal. No respiratory distress. She has no wheezes. She has no rales. She exhibits no tenderness. Abdominal: Soft. Bowel sounds are normal. She exhibits no distension and no mass. There is no tenderness. There is no rebound and no guarding. Musculoskeletal: Normal range of motion. She exhibits no edema. Tenderness to left posterior lateral rib cage  Good a/p  NVI  No urinary or fecal incontinence   Lymphadenopathy:     She has no cervical adenopathy. Neurological: She is alert and oriented to person, place, and time. She has normal reflexes. No cranial nerve deficit. She exhibits normal muscle tone. Coordination normal.   Skin: Skin is warm and dry. She is not diaphoretic. Psychiatric: She has a normal mood and affect.  Her behavior is normal. Judgment and thought content normal.   Nursing note and vitals reviewed. MDM  Number of Diagnoses or Management Options  Acute right-sided low back pain with right-sided sciatica:   Chest wall pain:   Fall, initial encounter:   Sciatica of right side:   Diagnosis management comments: DDx: sprain, strain, fracture       Amount and/or Complexity of Data Reviewed  Tests in the radiology section of CPT®: ordered and reviewed  Review and summarize past medical records: yes  Independent visualization of images, tracings, or specimens: yes    Patient Progress  Patient progress: stable    ED Course       Procedures    IMAGING RESULTS:  XR RIBS LT W PA CXR MIN 3 V   Final Result   INDICATION:  Fall off ladder with left rib pain      EXAM: Examination: Frontal view of the chest and 3 views left ribs. Comparison  June 14, 2017.      FINDINGS: The cardiomediastinal silhouette is normal. The pulmonary vasculature  is not engorged. There is no pneumothorax, focal parenchymal opacity or  effusion. No displaced rib fractures.     IMPRESSION  IMPRESSION:  1. No acute abnormality   XR SPINE LUMB 2 OR 3 V   Final Result   INDICATION:  fall off ladder lower back pain     EXAM: 3 views lumbar spine. Comparison August 4, 2014.     FINDINGS: Alignment is normal. Disc spaces are preserved. No bony destructive  lesions or fractures.     IMPRESSION  IMPRESSION:  1. No acute abnormality       IMPRESSION:  1. Fall, initial encounter    2. Chest wall pain    3. Acute right-sided low back pain with right-sided sciatica    4. Sciatica of right side        PLAN:  1. Discharge home  Current Discharge Medication List      START taking these medications    Details   oxyCODONE-acetaminophen (PERCOCET) 5-325 mg per tablet Take 1 Tab by mouth every six (6) hours as needed for Pain. Max Daily Amount: 4 Tabs. Qty: 10 Tab, Refills: 0      methocarbamol (ROBAXIN-750) 750 mg tablet Take 1 Tab by mouth four (4) times daily.   Qty: 15 Tab, Refills: 0 2.   Follow-up Information     Follow up With Details Comments Contact Info    Tolu Beaulieu NP In 2 days As needed [id number] [facility]      Conrad Severs, MD In 2 days As needed [address] [facility]  [address] [facility] [address]  [phone]          Return to ED if worse     DISCHARGE NOTE:  11:03 AM  The patient is ready for discharge. The patients signs, symptoms, diagnosis, and instructions for discharge have been discussed and the pt has conveyed their understanding. The patient is to follow up as recommended with PCP or return to the ER should their symptoms worsen. Plan has been discussed and patient has conveyed their agreement. This note is prepared by Jay Castellano, acting as Scribe for [employer]. BRIA Beaulieu: The scribe's documentation has been prepared under my direction and personally reviewed by me in its entirety. I confirm that the note above accurately reflects all work, treatment, procedures, and medical decision making performed by me.

## 2017-08-10 ENCOUNTER — HOSPITAL ENCOUNTER (EMERGENCY)
Age: 32
Discharge: HOME | End: 2017-08-10
Attending: EMERGENCY MEDICINE
Payer: SELF-PAY

## 2017-08-10 ENCOUNTER — APPOINTMENT (OUTPATIENT)
Dept: CT IMAGING | Age: 32
End: 2017-08-10
Attending: EMERGENCY MEDICINE
Payer: SELF-PAY

## 2017-08-10 ENCOUNTER — APPOINTMENT (OUTPATIENT)
Dept: MRI IMAGING | Age: 32
End: 2017-08-10
Attending: EMERGENCY MEDICINE
Payer: SELF-PAY

## 2017-08-10 VITALS
BODY MASS INDEX: 34.99 KG/M2 | DIASTOLIC BLOOD PRESSURE: 80 MMHG | SYSTOLIC BLOOD PRESSURE: 140 MMHG | RESPIRATION RATE: 18 BRPM | OXYGEN SATURATION: 100 % | WEIGHT: 210 LBS | HEART RATE: 80 BPM | HEIGHT: 65 IN | TEMPERATURE: 98 F

## 2017-08-10 DIAGNOSIS — M51.37 DEGENERATIVE DISC DISEASE AT L5-S1 LEVEL: Primary | ICD-10-CM

## 2017-08-10 DIAGNOSIS — M51.36 BULGING LUMBAR DISC: ICD-10-CM

## 2017-08-10 LAB
APPEARANCE UR: CLEAR
BACTERIA URNS QL MICRO: NEGATIVE /HPF
BILIRUB UR QL: NEGATIVE
COLOR UR: ABNORMAL
EPITH CASTS URNS QL MICRO: ABNORMAL /LPF
GLUCOSE UR STRIP.AUTO-MCNC: >1000 MG/DL
HCG UR QL: NEGATIVE
HCG UR QL: NEGATIVE
HGB UR QL STRIP: NEGATIVE
HYALINE CASTS URNS QL MICRO: ABNORMAL /LPF (ref 0–5)
KETONES UR QL STRIP.AUTO: NEGATIVE MG/DL
LEUKOCYTE ESTERASE UR QL STRIP.AUTO: NEGATIVE
NITRITE UR QL STRIP.AUTO: NEGATIVE
PH UR STRIP: 7 [PH] (ref 5–8)
PROT UR STRIP-MCNC: NEGATIVE MG/DL
RBC #/AREA URNS HPF: ABNORMAL /HPF (ref 0–5)
SP GR UR REFRACTOMETRY: 1.03 (ref 1–1.03)
UA: UC IF INDICATED,UAUC: ABNORMAL
UROBILINOGEN UR QL STRIP.AUTO: 0.2 EU/DL (ref 0.2–1)
WBC URNS QL MICRO: ABNORMAL /HPF (ref 0–4)

## 2017-08-10 PROCEDURE — 72148 MRI LUMBAR SPINE W/O DYE: CPT

## 2017-08-10 PROCEDURE — 81001 URINALYSIS AUTO W/SCOPE: CPT | Performed by: EMERGENCY MEDICINE

## 2017-08-10 PROCEDURE — 96372 THER/PROPH/DIAG INJ SC/IM: CPT

## 2017-08-10 PROCEDURE — 81025 URINE PREGNANCY TEST: CPT | Performed by: EMERGENCY MEDICINE

## 2017-08-10 PROCEDURE — 99284 EMERGENCY DEPT VISIT MOD MDM: CPT

## 2017-08-10 PROCEDURE — 72131 CT LUMBAR SPINE W/O DYE: CPT

## 2017-08-10 PROCEDURE — 74011250636 HC RX REV CODE- 250/636: Performed by: EMERGENCY MEDICINE

## 2017-08-10 PROCEDURE — 74011250637 HC RX REV CODE- 250/637: Performed by: EMERGENCY MEDICINE

## 2017-08-10 RX ORDER — OXYCODONE AND ACETAMINOPHEN 5; 325 MG/1; MG/1
2 TABLET ORAL
Status: COMPLETED | OUTPATIENT
Start: 2017-08-10 | End: 2017-08-10

## 2017-08-10 RX ORDER — HYDROMORPHONE HYDROCHLORIDE 1 MG/ML
1 INJECTION, SOLUTION INTRAMUSCULAR; INTRAVENOUS; SUBCUTANEOUS
Status: DISCONTINUED | OUTPATIENT
Start: 2017-08-10 | End: 2017-08-10

## 2017-08-10 RX ORDER — DEXAMETHASONE SODIUM PHOSPHATE 4 MG/ML
10 INJECTION, SOLUTION INTRA-ARTICULAR; INTRALESIONAL; INTRAMUSCULAR; INTRAVENOUS; SOFT TISSUE
Status: COMPLETED | OUTPATIENT
Start: 2017-08-10 | End: 2017-08-10

## 2017-08-10 RX ORDER — OXYCODONE AND ACETAMINOPHEN 5; 325 MG/1; MG/1
1 TABLET ORAL
Qty: 20 TAB | Refills: 0 | Status: SHIPPED | OUTPATIENT
Start: 2017-08-10 | End: 2017-08-19

## 2017-08-10 RX ORDER — PREDNISONE 10 MG/1
TABLET ORAL
Qty: 48 TAB | Refills: 0 | Status: SHIPPED | OUTPATIENT
Start: 2017-08-10 | End: 2017-09-08

## 2017-08-10 RX ORDER — HYDROMORPHONE HYDROCHLORIDE 1 MG/ML
1 INJECTION, SOLUTION INTRAMUSCULAR; INTRAVENOUS; SUBCUTANEOUS
Status: COMPLETED | OUTPATIENT
Start: 2017-08-10 | End: 2017-08-10

## 2017-08-10 RX ORDER — KETOROLAC TROMETHAMINE 30 MG/ML
30 INJECTION, SOLUTION INTRAMUSCULAR; INTRAVENOUS
Status: COMPLETED | OUTPATIENT
Start: 2017-08-10 | End: 2017-08-10

## 2017-08-10 RX ADMIN — HYDROMORPHONE HYDROCHLORIDE 1 MG: 1 INJECTION, SOLUTION INTRAMUSCULAR; INTRAVENOUS; SUBCUTANEOUS at 20:23

## 2017-08-10 RX ADMIN — DEXAMETHASONE SODIUM PHOSPHATE 10 MG: 4 INJECTION, SOLUTION INTRAMUSCULAR; INTRAVENOUS at 20:23

## 2017-08-10 RX ADMIN — OXYCODONE HYDROCHLORIDE AND ACETAMINOPHEN 2 TABLET: 5; 325 TABLET ORAL at 22:38

## 2017-08-10 RX ADMIN — KETOROLAC TROMETHAMINE 30 MG: 30 INJECTION, SOLUTION INTRAMUSCULAR at 20:22

## 2017-08-10 NOTE — LETTER
[address] 
[facility] EMERGENCY DEPT 
[address] [place] [address] [address] [phone] [phone] Work/School Note Date: 8/10/2017 To Whom It May concern: 
 
Edy Mcelroy was seen and treated today in the emergency room by the following provider(s): 
Attending Provider: Bela Johns MD.   
 
Edy Mcelroy may return to work on 8/14/2017. Sincerely, Bela Johns MD

## 2017-08-10 NOTE — LETTER
[address] 
[facility] EMERGENCY DEPT 
[address] [place] [address] [address] [phone] 
[phone] Work/School Note Date: 8/10/2017 To Whom It May concern: 
 
Sheila Rose was seen and treated today in the emergency room by the following provider(s): 
Attending Provider: Erick Ochoa MD.   
 
Mila Paul may return to work on 8/14/2017. Sincerely, Erick Ochoa MD

## 2017-08-10 NOTE — ED PROVIDER NOTES
HPI Comments: Kimi Carter is a 27 y.o. female with PMHx significant for sciatica, DM, and DJD, who presents ambulatory to [facility] ED with cc of intermittent worsening lower back pain that is radiating down both her legs for 5 days. Pt states that for the last couple of weeks, she has noticed urinary incontinence and that today at work, she had lost control of her bowels. She denies having any recent trauma and notes that she had not been seen before concerning her sciatica. She also notes that previously her pain would only radiate down her right leg, but it now radiates down both. Pt denies any urinary frequency or dysuria. PCP:Stephanie Lynch NP    Social Hx: - smoking; - EtOH; - illicit drug use    There are no other complains, changes, or physical findings at this time. The history is provided by the patient. Past Medical History:   Diagnosis Date    Abnormal Pap smear     abnormal pap at 15 y/o normal since     [facility] pain 2015    [facility]     Diabetes mellitus     Gestational Diabetes with last two pregnancies-diet controlled       Past Surgical History:   Procedure Laterality Date    HX  SECTION      HX CHOLECYSTECTOMY      HX OTHER SURGICAL      cholecystectomy          Family History:   Problem Relation Age of Onset    Diabetes Father     Hypertension Father     Stroke Father     Kidney Disease Father        Social History     Social History    Marital status: SINGLE     Spouse name: N/A    Number of children: N/A    Years of education: N/A     Occupational History    Not on file.      Social History Main Topics    Smoking status: Never Smoker    Smokeless tobacco: Never Used    Alcohol use No    Drug use: No    Sexual activity: Yes     Birth control/ protection: None     Other Topics Concern    Not on file     Social History Narrative         ALLERGIES: Tramadol    Review of Systems   Constitutional: Negative for activity change, chills and fever. HENT: Negative for congestion and sore throat. Eyes: Negative for pain and redness. Respiratory: Negative for cough, chest tightness and shortness of breath. Cardiovascular: Negative for chest pain and palpitations. Gastrointestinal: Negative for abdominal pain, diarrhea, nausea and vomiting. Genitourinary: Negative for dysuria, frequency and urgency. Musculoskeletal: Positive for back pain. Negative for neck pain. Skin: Negative for rash. Neurological: Negative for syncope, light-headedness and headaches. Psychiatric/Behavioral: Negative for confusion. All other systems reviewed and are negative. Patient Vitals for the past 12 hrs:   Temp Pulse Resp BP SpO2   08/10/17 2045 - - - 139/81 100 %   08/10/17 2037 98 °F (36.7 °C) 80 - - -   08/10/17 2037 - - 18 - -   08/10/17 2024 98 °F (36.7 °C) - - - -   08/10/17 2017 - - - - 100 %   08/10/17 2016 - - - 134/77 -       Physical Exam   Constitutional: She is oriented to person, place, and time. She appears well-developed and well-nourished. No distress. HENT:   Head: Normocephalic. Nose: Nose normal.   Mouth/Throat: Oropharynx is clear and moist. No oropharyngeal exudate. Eyes: Conjunctivae are normal. Pupils are equal, round, and reactive to light. No scleral icterus. Neck: Normal range of motion. Neck supple. No JVD present. No tracheal deviation present. No thyromegaly present. Cardiovascular: Normal rate, regular rhythm and intact distal pulses. Exam reveals no gallop and no friction rub. No murmur heard. Pulmonary/Chest: Effort normal and breath sounds normal. No stridor. No respiratory distress. She has no wheezes. She has no rales. Abdominal: Soft. Bowel sounds are normal. She exhibits no distension. There is no tenderness. There is no rebound and no guarding. Perianal sensation intact with good rectal tone.    Genitourinary:   Genitourinary Comments: Rectal exam: perianal sensation intact; good rectal tone; light brown stool;    Musculoskeletal: Normal range of motion. She exhibits no edema. Back: mild bilateral lower lumbar paraspinal muscle ttp; no midline vertebral ttp or step offs; toes up/down; ambulated into treatment area without difficulty; +SLR bilaterally;     Lymphadenopathy:     She has no cervical adenopathy. Neurological: She is alert and oriented to person, place, and time. No cranial nerve deficit. She exhibits normal muscle tone. Coordination normal.   Skin: Skin is warm and dry. No rash noted. She is not diaphoretic. No erythema. Psychiatric: She has a normal mood and affect. Her behavior is normal.   Nursing note and vitals reviewed. MDM  Number of Diagnoses or Management Options  Bulging lumbar disc:   Degenerative disc disease at L5-S1 level:   Diagnosis management comments: DDx: sciatica, cauda equina syndrome       Amount and/or Complexity of Data Reviewed  Clinical lab tests: reviewed and ordered  Tests in the radiology section of CPT®: ordered and reviewed  Review and summarize past medical records: yes    Risk of Complications, Morbidity, and/or Mortality  General comments: No evidence of cord compression/cauda equina on l spine mri; no neuro deficits/saddle anesthesia on exam; good rectal tone; d/w ortho; dc with steroid taper and pain control; pt agreed to follow up with ortho next week; Naye Blanton MD      Patient Progress  Patient progress: stable    ED Course       Procedures    Procedure Note - Rectal Exam:   7:07 PM  Performed by: Naye Blanton MD  Chaperoned by: Judie Mcbride RN  Rectal exam performed. Light brown stool was collected. Stool was Hemoccult tested, and found to be heme Negative. The procedure took 1-15 minutes, and pt tolerated well.   Written by Kb Oliva ED Scribe, as dictated by Naye Blanton MD.    PROGRESS NOTE:  7:10 PM  Reviewed pt's past medical records, she has had multiple recent frequent ED visits for pain relief and back pain that radiates down her legs bilaterally. PROGRESS NOTE:  8:51 PM  Consulted with orthopedist via tiger text. CONSULT NOTE:  9:05 PM  Torrie Brasher MD spoke with Panchito Orellana MD,  Specialty: Orthopedic Surgery  Discussed patient's hx, disposition, and available diagnostic and imaging results. Reviewed care plans. Consultant agrees with plans as outlined. Spoke with ANDREW Angeles who spoke with Dr. Bee Oviedo who recommended post void residual bladder scan to rule out urinary retention and requested an MRI of the lumbar spine to rule out cauda equina syndrome. PROGRESS NOTE:  10:39 PM  Pt has been re-evaluated. MRI resulted with no evidence of cord compression/cauda equina syndrome. Will discharge home with prednisone and f/u with Dr. Travis Cali. LABORATORY TESTS:  Recent Results (from the past 12 hour(s))   HCG URINE, QL. - POC    Collection Time: 08/10/17  7:44 PM   Result Value Ref Range    Pregnancy test,urine (POC) NEGATIVE  NEG     URINALYSIS W/ REFLEX CULTURE    Collection Time: 08/10/17  7:45 PM   Result Value Ref Range    Color YELLOW/STRAW      Appearance CLEAR CLEAR      Specific gravity 1.030 1.003 - 1.030      pH (UA) 7.0 5.0 - 8.0      Protein NEGATIVE  NEG mg/dL    Glucose >1000 (A) NEG mg/dL    Ketone NEGATIVE  NEG mg/dL    Bilirubin NEGATIVE  NEG      Blood NEGATIVE  NEG      Urobilinogen 0.2 0.2 - 1.0 EU/dL    Nitrites NEGATIVE  NEG      Leukocyte Esterase NEGATIVE  NEG      UA:UC IF INDICATED CULTURE NOT INDICATED BY UA RESULT CNI      WBC 0-4 0 - 4 /hpf    RBC 0-5 0 - 5 /hpf    Epithelial cells FEW FEW /lpf    Bacteria NEGATIVE  NEG /hpf    Hyaline cast 0-2 0 - 5 /lpf   HCG URINE, QL    Collection Time: 08/10/17  7:45 PM   Result Value Ref Range    HCG urine, Ql. NEGATIVE  NEG         IMAGING RESULTS:    CT Results  (Last 48 hours)               08/10/17 2005  CT SPINE LUMB WO CONT Final result    Impression:  IMPRESSION: L5-S1 Central degenerative disc bulging.  Bilateral SI joint osteoarthrosis. Narrative:  INDICATION: Intermittent worsening of lower back pain radiating down and both   legs over the last 5 days. 2 week history of urinary incontinence. Today at   work, lost control of bowels. Emergency department presentation. COMPARISON: Radiographs 7/23/2017       EXAM: Axial CT imaging of the lumbar spine with thin section axial   reconstructions as well as coronal and sagittal reformations. CT dose reduction   was achieved through use of a standardized protocol tailored for this   examination and automatic exposure control for dose modulation. FINDINGS: Bone mineralization is normal. There is normal vertebral body height   and disc height. Alignment is anatomic. Small endplate marginal osteophytes are   present at the L5-S1 level. The posterior processes and facet joints are intact. There is mild central disc bulging at L5-S1 without other substantial disc   abnormality demonstrated on the CT images. No canal or foraminal stenosis is   evident. There is bilateral SI joint osteoarthrosis, mild-moderate bilaterally   with bridging osteophyte formation anteriorly on the right. No paraspinal soft   tissue mass is demonstrated. EXAM:  MRI LUMB SPINE WO CONT  Clinical history: Disc bulge, possible cauda equina syndrome. INDICATION:  Low back pain, cauda equina syndrome; l5/S1 bulging disc with  urinary and fecal incontinence; [facility] MRI request per Dr. Tolu Beaulieu, orthopedics/spine.     COMPARISON: None     TECHNIQUE: MR imaging of the lumbar spine was performed using the following  sequences: sagittal T1, T2, STIR;  axial T1, T2.      CONTRAST:  None.     FINDINGS:     There is normal alignment of the lumbar spine. Vertebral body heights are  maintained. Disc desiccation loss of disc height at L5-S1. No marrow signal  abnormality. The conus medullaris terminates at . Signal and caliber of the  distal spinal cord are within normal limits.  No abnormality of the cauda equina. The paraspinal soft tissues are within normal limits.     Lower thoracic spine: No herniation or stenosis.     L1-L2:  No herniation or stenosis.     L2-L3:  No herniation or stenosis.     L3-L4:  No herniation or stenosis.     L4-L5:  No herniation or stenosis.     L5-S1:  Disc desiccation. Tiny central disc extrusion which is cranially  oriented posterior to the L5 vertebral body nearly 5 mm in craniocaudal  dimension. Annular tear. There is no canal compromise. No foraminal compromise.     IMPRESSION  IMPRESSION:  Tiny disc extrusion at L5-S1. There is no canal or foraminal compromise present.     No evidence of distal cord signal abnormality.            MEDICATIONS GIVEN:  Medications   ketorolac (TORADOL) injection 30 mg (30 mg IntraMUSCular Given 8/10/17 2022)   dexamethasone (DECADRON) 4 mg/mL injection 10 mg (10 mg IntraMUSCular Given 8/10/17 2023)   HYDROmorphone (PF) (DILAUDID) injection 1 mg (1 mg IntraMUSCular Given 8/10/17 2023)   oxyCODONE-acetaminophen (PERCOCET) 5-325 mg per tablet 2 Tab (2 Tabs Oral Given 8/10/17 2238)       IMPRESSION:  1. Degenerative disc disease at L5-S1 level    2. Bulging lumbar disc        PLAN:  1. Current Discharge Medication List      START taking these medications    Details   predniSONE (STERAPRED DS) 10 mg dose pack Take as directed  Qty: 48 Tab, Refills: 0         CONTINUE these medications which have CHANGED    Details   oxyCODONE-acetaminophen (PERCOCET) 5-325 mg per tablet Take 1 Tab by mouth every six (6) hours as needed for Pain. Max Daily Amount: 4 Tabs. Qty: 20 Tab, Refills: 0           2.    Follow-up Information     Follow up With Details Comments [address], [place] Schedule an appointment as soon as possible for a visit in 1 day  Kim[place] [address] [place] [address]  [phone]      [facility] [place] EM[facility] DE[facility] Go in 1 day If symptoms worsen [address] [address] [place] Return to ED if worse     DISCHARGE NOTE  10:40 PM  The patient has been re-evaluated and is ready for discharge. Reviewed available results with patient. Counseled patient on diagnosis and care plan. Patient has expressed understanding, and all questions have been answered. Patient agrees with plan and agrees to follow up as recommended, or return to the ED if their symptoms worsen. Discharge instructions have been provided and explained to the patient, along with reasons to return to the ED. ATTESTATION:  This note is prepared by Tone Moody, acting as Scribe for Jeanie Livingston MD.    Jeanie Livingston MD: The scribe's documentation has been prepared under my direction and personally reviewed by me in its entirety. I confirm that the note above accurately reflects all work, treatment, procedures, and medical decision making performed by me.

## 2017-08-11 NOTE — DISCHARGE INSTRUCTIONS
Learning About Degenerative Disc Disease  What is degenerative disc disease? Degenerative disc disease is not really a disease. It's a term used to describe the normal changes in your spinal discs as you age. Spinal discs are small, spongy discs that separate the bones (vertebrae) that make up the spine. The discs act as shock absorbers for the spine, so it can flex, bend, and twist.  Degenerative disc disease can take place in one or more places along the spine. It most often occurs in the discs in the lower back and the neck. What causes it? As we age, our spinal discs break down, or degenerate. This breakdown causes the symptoms of degenerative disc disease in some people. When the discs break down, they can lose fluid and dry out, and their outer layers can have tiny cracks or tears. This leads to less padding and less space between the bones in the spine. The body reacts to this by making bony growths on the spine called bone spurs. These spurs can press on the spinal nerve roots or spinal cord. This can cause pain and can affect how well the nerves work. What are the symptoms? Many people with degenerative disc disease have no pain. But others have severe pain or other symptoms that limit their activities. Some of the most common symptoms are:  · Pain in the back or neck. Where the pain occurs depends on which discs are affected. · Pain that gets worse when you move, such as bending over, reaching up, or twisting. · Pain that may occur in the rear end (buttocks), arm, or leg if a nerve is pinched. · Numbness or tingling in your arm or leg. How is it diagnosed? A doctor can often diagnose degenerative disc disease while doing a physical exam. If your exam shows no signs of a serious condition, imaging tests (such as an X-ray) aren't likely to help your doctor find the cause of your symptoms.   Sometimes degenerative disc disease is found when an X-ray is taken for another reason, such as an injury or other health problem. But even if the doctor finds degenerative disc disease, that doesn't always mean that you will have symptoms. How is it treated? There are several things you can do at home to manage pain from this problem. · To relieve pain, use ice or heat (whichever feels better) on the affected area. ¨ Put ice or a cold pack on the area for 10 to 20 minutes at a time. Put a thin cloth between the ice and your skin. ¨ Put a warm water bottle, a heating pad set on low, or a warm cloth on your back. Put a thin cloth between the heating pad and your skin. Do not go to sleep with a heating pad on your skin. · Ask your doctor if you can take acetaminophen (such as Tylenol) or nonsteroidal anti-inflammatory drugs, such as ibuprofen or naproxen. Your doctor can prescribe stronger medicines if needed. Be safe with medicines. Read and follow all instructions on the label. · Get some exercise every day. Exercise is one of the best ways to help your back feel better and stay better. It's best to start each exercise slowly. You may notice a little soreness, and that's okay. But if an exercise makes your pain worse, stop doing it. Here are things you can try:  ¨ Walking. It's the simplest and maybe the best activity for your back. It gets your blood moving and helps your muscles stay strong. ¨ Exercises that gently stretch and strengthen your stomach, back, and leg muscles. The stronger those muscles are, the better they're able to protect your back. If you have constant or severe pain in your back or spine, you may need other treatments, such as physical therapy. In some cases, your doctor may suggest surgery. Follow-up care is a key part of your treatment and safety. Be sure to make and go to all appointments, and call your doctor if you are having problems. It's also a good idea to know your test results and keep a list of the medicines you take. Where can you learn more?   Go to http://mindy.info/. Enter D252 in the search box to learn more about \"Learning About Degenerative Disc Disease. \"  Current as of: March 21, 2017  Content Version: 11.3  © 9484-8735 [employer]. Care instructions adapted under license by [employer] (which disclaims liability or warranty for this information). If you have questions about a medical condition or this instruction, always ask your healthcare professional. [address] [address] any warranty or liability for your use of this information.

## 2017-08-11 NOTE — ED NOTES
Pt reports pain is at a manageable level at this time. Pain medications held. Dr. Flaquita Knapp notified of patient status.

## 2017-08-19 ENCOUNTER — HOSPITAL ENCOUNTER (EMERGENCY)
Age: 32
Discharge: HOME | End: 2017-08-19
Attending: EMERGENCY MEDICINE
Payer: SELF-PAY

## 2017-08-19 VITALS
DIASTOLIC BLOOD PRESSURE: 83 MMHG | SYSTOLIC BLOOD PRESSURE: 144 MMHG | HEIGHT: 65 IN | BODY MASS INDEX: 35.78 KG/M2 | RESPIRATION RATE: 12 BRPM | OXYGEN SATURATION: 99 % | WEIGHT: 214.73 LBS | HEART RATE: 103 BPM | TEMPERATURE: 98.1 F

## 2017-08-19 DIAGNOSIS — M51.26 LUMBAR HERNIATED DISC: ICD-10-CM

## 2017-08-19 DIAGNOSIS — M51.37 DEGENERATIVE DISC DISEASE AT L5-S1 LEVEL: Primary | ICD-10-CM

## 2017-08-19 LAB
APPEARANCE UR: CLEAR
BACTERIA URNS QL MICRO: ABNORMAL /HPF
BILIRUB UR QL: NEGATIVE
COLOR UR: ABNORMAL
EPITH CASTS URNS QL MICRO: ABNORMAL /LPF
GLUCOSE UR STRIP.AUTO-MCNC: >1000 MG/DL
HGB UR QL STRIP: NEGATIVE
HYALINE CASTS URNS QL MICRO: ABNORMAL /LPF (ref 0–5)
KETONES UR QL STRIP.AUTO: NEGATIVE MG/DL
LEUKOCYTE ESTERASE UR QL STRIP.AUTO: NEGATIVE
NITRITE UR QL STRIP.AUTO: NEGATIVE
PH UR STRIP: 7 [PH] (ref 5–8)
PROT UR STRIP-MCNC: NEGATIVE MG/DL
RBC #/AREA URNS HPF: ABNORMAL /HPF (ref 0–5)
SP GR UR REFRACTOMETRY: <1.005 (ref 1–1.03)
UA: UC IF INDICATED,UAUC: ABNORMAL
UROBILINOGEN UR QL STRIP.AUTO: 0.2 EU/DL (ref 0.2–1)
WBC URNS QL MICRO: ABNORMAL /HPF (ref 0–4)

## 2017-08-19 PROCEDURE — 74011250637 HC RX REV CODE- 250/637: Performed by: PHYSICIAN ASSISTANT

## 2017-08-19 PROCEDURE — 99283 EMERGENCY DEPT VISIT LOW MDM: CPT

## 2017-08-19 PROCEDURE — 87086 URINE CULTURE/COLONY COUNT: CPT | Performed by: PHYSICIAN ASSISTANT

## 2017-08-19 PROCEDURE — 81001 URINALYSIS AUTO W/SCOPE: CPT | Performed by: PHYSICIAN ASSISTANT

## 2017-08-19 PROCEDURE — 74011250636 HC RX REV CODE- 250/636: Performed by: PHYSICIAN ASSISTANT

## 2017-08-19 PROCEDURE — 96372 THER/PROPH/DIAG INJ SC/IM: CPT

## 2017-08-19 RX ORDER — HYDROMORPHONE HYDROCHLORIDE 1 MG/ML
1 INJECTION, SOLUTION INTRAMUSCULAR; INTRAVENOUS; SUBCUTANEOUS
Status: COMPLETED | OUTPATIENT
Start: 2017-08-19 | End: 2017-08-19

## 2017-08-19 RX ORDER — ONDANSETRON 4 MG/1
4 TABLET, ORALLY DISINTEGRATING ORAL
Status: COMPLETED | OUTPATIENT
Start: 2017-08-19 | End: 2017-08-19

## 2017-08-19 RX ORDER — OXYCODONE AND ACETAMINOPHEN 5; 325 MG/1; MG/1
1 TABLET ORAL
Qty: 20 TAB | Refills: 0 | Status: SHIPPED | OUTPATIENT
Start: 2017-08-19 | End: 2017-08-25

## 2017-08-19 RX ORDER — KETOROLAC TROMETHAMINE 10 MG/1
10 TABLET, FILM COATED ORAL
Qty: 20 TAB | Refills: 0 | Status: SHIPPED | OUTPATIENT
Start: 2017-08-19 | End: 2017-08-26

## 2017-08-19 RX ORDER — KETOROLAC TROMETHAMINE 30 MG/ML
15 INJECTION, SOLUTION INTRAMUSCULAR; INTRAVENOUS
Status: COMPLETED | OUTPATIENT
Start: 2017-08-19 | End: 2017-08-19

## 2017-08-19 RX ADMIN — ONDANSETRON 4 MG: 4 TABLET, ORALLY DISINTEGRATING ORAL at 13:09

## 2017-08-19 RX ADMIN — HYDROMORPHONE HYDROCHLORIDE 1 MG: 1 INJECTION, SOLUTION INTRAMUSCULAR; INTRAVENOUS; SUBCUTANEOUS at 13:09

## 2017-08-19 RX ADMIN — KETOROLAC TROMETHAMINE 15 MG: 30 INJECTION, SOLUTION INTRAMUSCULAR at 13:09

## 2017-08-19 NOTE — ED NOTES
Patient received discharge instructions and prescriptions from PA; VSS; ambulated from ED under own power; declined wheelchair assistance

## 2017-08-19 NOTE — ED PROVIDER NOTES
HPI Comments: Asha Ann is a 27 y.o. female with PMHx significant for DM, DDD, bulging disk who presents ambulatory to [id number] [place] [address] ED with cc of worsening lower back pain with intermittent radiation of pain down left leg x 2 weeks. Pt also c/o urinary incontinence x week. Pt was seen in the ED 9 days ago with the same complaints and was evaluated for cord compression/cauda equina via MRI. There was no evidence of cauda equina or cord compression in the MRI and pt was discharged home with Prednisone and Percocet for pain management. Pt was advised to make an appointment with orthopedics, which she did, and was told by the orthopedic doctor she needs to go see a neurologist. Pt has made an appointment with a neurologist but cannot be seen until the  of this month. She has run out of her percocet 4 days ago and has been trying to control her pain with OTC Tylenol. Pt denies any relief from her Tylenol, she stated \"I am sick of being in pain\". Pt notes her occasional urinary incontinence has remained. Pt specifically denies any CP, SOB, abdominal pain, nausea, vomiting, diarrhea, numbness, tingling, bowel incontinence. PCP: Nabila Padilla NP    Social Hx: - tobacco (-), -EtOH (-), - illicit drugs (-). There are no other complaints, changes or physical findings at this time. The history is provided by the patient. No  was used.         Past Medical History:   Diagnosis Date    Abnormal Pap smear     abnormal pap at 13 y/o normal since     Back pain 2015    [facility]     Diabetes mellitus     Gestational Diabetes with last two pregnancies-diet controlled       Past Surgical History:   Procedure Laterality Date    HX  SECTION      HX CHOLECYSTECTOMY      HX OTHER SURGICAL      cholecystectomy          Family History:   Problem Relation Age of Onset    Diabetes Father     Hypertension Father     Stroke Father     Kidney Disease Father        Social History     Social History    Marital status: SINGLE     Spouse name: N/A    Number of children: N/A    Years of education: N/A     Occupational History    Not on file. Social History Main Topics    Smoking status: Never Smoker    Smokeless tobacco: Never Used    Alcohol use No    Drug use: No    Sexual activity: Yes     Birth control/ protection: None     Other Topics Concern    Not on file     Social History Narrative         ALLERGIES: Tramadol    Review of Systems   Constitutional: Negative. Negative for chills and fever. HENT: Negative. Negative for rhinorrhea and sore throat. Eyes: Negative. Negative for visual disturbance. Respiratory: Negative. Negative for cough, chest tightness, shortness of breath and wheezing. Cardiovascular: Negative. Negative for chest pain and palpitations. Gastrointestinal: Negative. Negative for abdominal pain, constipation, diarrhea, nausea and vomiting. Genitourinary: Negative. Negative for dysuria and hematuria. Musculoskeletal: Positive for back pain. Negative for arthralgias and myalgias. Skin: Negative. Negative for rash. Allergic/Immunologic: Negative. Negative for environmental allergies and food allergies. Neurological: Negative. Negative for headaches. Positive for Urinary incontinence  Negative for saddle anesthesia  Negative for fecal incontinence   Psychiatric/Behavioral: Negative. Negative for suicidal ideas. Vitals:    08/19/17 1229   BP: 144/83   Pulse: (!) 103   Resp: 12   Temp: 98.1 °F (36.7 °C)   SpO2: 99%   Weight: 97.4 kg (214 lb 11.7 oz)   Height: 5' 5\" (1.651 m)            Physical Exam   Constitutional: She is oriented to person, place, and time. She appears well-developed. No distress. Pt is an AAF, awake and alert in NAD. Pt with elevated BMI. HENT:   Head: Normocephalic and atraumatic.    Right Ear: Tympanic membrane, external ear and ear canal normal.   Left Ear: Tympanic membrane, external ear and ear canal normal.   Nose: Nose normal.   Mouth/Throat: Uvula is midline, oropharynx is clear and moist and mucous membranes are normal.   Eyes: Conjunctivae and EOM are normal. Pupils are equal, round, and reactive to light. Right eye exhibits no discharge. Left eye exhibits no discharge. Neck: Normal range of motion. Cardiovascular: Normal rate, normal heart sounds and intact distal pulses. Pulmonary/Chest: Effort normal and breath sounds normal. No respiratory distress. She has no wheezes. She has no rales. She exhibits no tenderness. Abdominal: Soft. Bowel sounds are normal. She exhibits no distension and no mass. There is no tenderness. There is no guarding. No CVA tenderness b/l. Musculoskeletal: Normal range of motion. She exhibits tenderness. She exhibits no edema. Spine: No edema, erythema, step offs, or obvious bony deformity. + lower  lumbar TTP. Neurological: She is alert and oriented to person, place, and time. She has normal reflexes. Coordination normal.   No focal neuro deficits. Neuro intact of UE and LE B/L, Sensation intact of UE and LE B/L. Strength 5/5 of UE B/L, Strength 5/5 of LE B/L. Pt ambulatory with steady gait, without difficulty or assistance. No foot drop appreciated. Bilateral seated straight leg-raise b/l. Symmetrical patellar DTR's intact. No Saddle paresthesia. Skin: Skin is warm and dry. No rash noted. She is not diaphoretic. No erythema. No pallor. Psychiatric: She has a normal mood and affect. Her behavior is normal.   Vitals reviewed. MDM  Number of Diagnoses or Management Options  Degenerative disc disease at L5-S1 level:   Lumbar herniated disc:   Diagnosis management comments: DDx: Bulging disc, Sciatica, strain, sprain, OA    No clinical suspicion of cauda equina: Although patient c/o urinary incontinence, no urinary retention noted on bladder scan, distended bladder was not noted PE.  No saddle paresthesia, DTRs intact, sensation of LE intact. MRI neg. Amount and/or Complexity of Data Reviewed  Clinical lab tests: ordered and reviewed  Review and summarize past medical records: yes    Patient Progress  Patient progress: stable    ED Course       Procedures    PROGRESS NOTE:  1:25 PM  Per nursing, bladder scan shows post void residual of 0 mL of urine in the bladder. Written by Kathryn Roberts ED Scribe, as dictated by Moustapha Nelson PA-C. PROGRESS NOTE:  2:30 PM  Extensive  review on the pt, Multiple prescriptions with over 11 providers. Most recent prescription was 20 tabs of oxycodone on the 8/10/2017. Pt has also been prescribed multiple narcotics in July includin of Norco, 10 of Percocet, 8 of Tylenol 3. Upon chart review has been on a pain contract in the past in which she violated on 6 different occasions. Written by Kathryn Roberts, ED Scribe, as dictated by Moustapha Nelson PA-C.    LABORATORY TESTS:  Recent Results (from the past 12 hour(s))   URINALYSIS W/ REFLEX CULTURE    Collection Time: 17  1:08 PM   Result Value Ref Range    Color YELLOW/STRAW      Appearance CLEAR CLEAR      Specific gravity <1.005 1.003 - 1.030    pH (UA) 7.0 5.0 - 8.0      Protein NEGATIVE  NEG mg/dL    Glucose >1000 (A) NEG mg/dL    Ketone NEGATIVE  NEG mg/dL    Bilirubin NEGATIVE  NEG      Blood NEGATIVE  NEG      Urobilinogen 0.2 0.2 - 1.0 EU/dL    Nitrites NEGATIVE  NEG      Leukocyte Esterase NEGATIVE  NEG      UA:UC IF INDICATED URINE CULTURE ORDERED (A) CNI      WBC 0-4 0 - 4 /hpf    RBC 0-5 0 - 5 /hpf    Epithelial cells MODERATE (A) FEW /lpf    Bacteria 2+ (A) NEG /hpf    Hyaline cast 2-5 0 - 5 /lpf     MEDICATIONS GIVEN:  Medications   ketorolac (TORADOL) injection 15 mg (15 mg IntraMUSCular Given 17 1309)   HYDROmorphone (PF) (DILAUDID) injection 1 mg (1 mg IntraMUSCular Given 17 130)   ondansetron (ZOFRAN ODT) tablet 4 mg (4 mg Oral Given 17 130)       IMPRESSION:  1. Degenerative disc disease at L5-S1 level    2. Lumbar herniated disc        PLAN:  1. Current Discharge Medication List      START taking these medications    Details   ketorolac (TORADOL) 10 mg tablet Take 1 Tab by mouth every six (6) hours as needed for Pain. Qty: 20 Tab, Refills: 0         CONTINUE these medications which have CHANGED    Details   oxyCODONE-acetaminophen (PERCOCET) 5-325 mg per tablet Take 1 Tab by mouth every four (4) hours as needed for Pain for up to 5 days. Max Daily Amount: 6 Tabs. Qty: 20 Tab, Refills: 0         STOP taking these medications       ibuprofen (MOTRIN) 600 mg tablet Comments:   Reason for Stoppin.   Follow-up Information     Follow up With Details Comments Contact Info    [facility] EMERGENCY DEPT  As needed or, If symptoms worsen [address] [place] [address]  [phone]        Return to ED if worse     DISCHARGE NOTE  2:33 PM  The patient has been re-evaluated and is ready for discharge. Reviewed available results with patient. Counseled pt on diagnosis and care plan. Pt has expressed understanding, and all questions have been answered. Pt agrees with plan and agrees to F/U as recommended, or return to the ED if their sxs worsen. Discharge instructions have been provided and explained to the pt, along with reasons to return to the ED. This note is prepared by Willy Tam acting as Scribe for BRIA Brock PA-C : The scribe's documentation has been prepared under my direction and personally reviewed by me in its entirety. I confirm that the note above accurately reflects all work, treatment, procedures, and medical decision making performed by me. This note will not be viewable in [address]

## 2017-08-19 NOTE — DISCHARGE INSTRUCTIONS
Herniated Disc: Care Instructions  Your Care Instructions    The bones that form the spine in your back are cushioned by small discs. If a disc is damaged, it may bulge or break open (herniate). A herniated disc can result from normal wear and tear as we age or from an injury or disease. If a herniated disc presses on a nerve, it can cause pain and numbness in your leg (sciatica) and/or back pain. You may be able to heal your herniated disc with a few weeks or months of rest, medicine, and exercises. In some cases, you may need surgery. Follow-up care is a key part of your treatment and safety. Be sure to make and go to all appointments, and call your doctor if you are having problems. It's also a good idea to know your test results and keep a list of the medicines you take. How can you care for yourself at home? · Take your medicines exactly as prescribed. Call your doctor if you think you are having a problem with your medicine. · Ask your doctor if you can take an over-the-counter pain medicine, such as acetaminophen (Tylenol), ibuprofen (Advil, Motrin), or naproxen (Aleve). Read and follow all instructions on the label. · Do not take two or more pain medicines at the same time unless the doctor told you to. Many pain medicines have acetaminophen, which is Tylenol. Too much acetaminophen (Tylenol) can be harmful. · Rest your back if your pain is severe. · Avoid movements and positions that increase your pain or numbness. · Try taking short walks and doing light activities that do not cause pain. Even if you are feeling some pain, it is important to keep your muscles active and strong. · Use heat or ice to relieve pain. ¨ To apply heat, put a warm water bottle, heating pad set on low, or warm cloth on your back. Do not go to sleep with a heating pad on your skin. ¨ To use ice, put ice or a cold pack on the area for 10 to 20 minutes at a time. Put a thin cloth between the ice and your skin.   · Your doctor may recommend a physical therapy program, where you learn exercises to do at home. These exercises strengthen the muscles that support your lower back and prevent reinjury. · Stay at a healthy weight. This may reduce the load on your back. · Quit smoking if you smoke. If you need help quitting, talk to your doctor about stop-smoking programs and medicines. These can increase your chances of quitting for good. · To avoid hurting your back when lifting:  ¨ Lift with your legs, not your back, by squatting and bending your knees. Avoid bending forward at the waist when lifting. ¨ Rise slowly. ¨ Keep the load as close to your body as possible, at the level of your navel. ¨ Avoid turning or twisting your body while holding a heavy object. ¨ Get help if you need to lift a heavy object. Never lift a heavy object above shoulder level. When should you call for help? Call 911 anytime you think you may need emergency care. For example, call if:  · You are unable to move a leg at all. Call your doctor now or seek immediate medical care if:  · You have new or worse symptoms in your arms, legs, chest, belly, or buttocks. Symptoms may include:  ¨ Numbness or tingling. ¨ Weakness. ¨ Pain. · You lose bladder or bowel control. Watch closely for changes in your health, and be sure to contact your doctor if:  · You are not getting better as expected. Where can you learn more? Go to http://asael-sloan.info/. Enter F534 in the search box to learn more about \"Herniated Disc: Care Instructions. \"  Current as of: March 21, 2017  Content Version: 11.3  © 6580-5197 [employer]. Care instructions adapted under license by [employer] (which disclaims liability or warranty for this information).  If you have questions about a medical condition or this instruction, always ask your healthcare professional. [place] [address] any warranty or liability for your use of this information.

## 2017-08-21 LAB
BACTERIA SPEC CULT: NORMAL
CC UR VC: NORMAL
SERVICE CMNT-IMP: NORMAL

## 2017-08-25 ENCOUNTER — OFFICE VISIT (OUTPATIENT)
Dept: NEUROLOGY | Age: 32
End: 2017-08-25

## 2017-08-25 VITALS
HEIGHT: 65 IN | WEIGHT: 218 LBS | SYSTOLIC BLOOD PRESSURE: 128 MMHG | DIASTOLIC BLOOD PRESSURE: 70 MMHG | OXYGEN SATURATION: 98 % | BODY MASS INDEX: 36.32 KG/M2 | HEART RATE: 88 BPM

## 2017-08-25 DIAGNOSIS — M79.605 PAIN IN BOTH LOWER EXTREMITIES: ICD-10-CM

## 2017-08-25 DIAGNOSIS — G89.29 CHRONIC BILATERAL LOW BACK PAIN WITHOUT SCIATICA: Primary | ICD-10-CM

## 2017-08-25 DIAGNOSIS — M79.604 PAIN IN BOTH LOWER EXTREMITIES: ICD-10-CM

## 2017-08-25 DIAGNOSIS — M54.50 CHRONIC BILATERAL LOW BACK PAIN WITHOUT SCIATICA: Primary | ICD-10-CM

## 2017-08-25 RX ORDER — GABAPENTIN 300 MG/1
300 CAPSULE ORAL 3 TIMES DAILY
Qty: 90 CAP | Refills: 2 | Status: SHIPPED | OUTPATIENT
Start: 2017-08-25 | End: 2018-04-26

## 2017-08-25 RX ORDER — KETOROLAC TROMETHAMINE 10 MG/1
10 TABLET, FILM COATED ORAL
Qty: 40 TAB | Refills: 2 | Status: SHIPPED | OUTPATIENT
Start: 2017-08-25 | End: 2017-08-26

## 2017-08-25 NOTE — PROGRESS NOTES
HISTORY OF PRESENT ILLNESS  Roro Kaiser is a 27 y.o. female. HPI Comments: Roro Kaiser is a 25-year-old right-handed -American female comes in today complaining of low back pain. Pain goes down both legs both front and back. She had an MRI scan of her cervical spine which showed disc desiccation at L5-S1 with a tiny central disc extrusion with no canal compromise and no foraminal compromise. The remainder of the study was normal.  He saw an orthopedist who appropriately told her there she did not have a surgical problem and sent her to me. She has been given some narcotics in the emergency room, a few pills. Reviewing her  reveals that she has gotten small amounts of narcotics from approximately 5 different physicians over the last several months. Patient denies any bowel or bladder complaints. She denies that anything makes her back pain better or worse, she just wants to be out of pain. Back Pain    The history is provided by the patient. Review of Systems   Constitutional:        Review of systems is positive for fatigue headaches and back pain. All other systems are reviewed and are negative. Musculoskeletal: Positive for back pain. Current Outpatient Prescriptions on File Prior to Visit   Medication Sig Dispense Refill    oxyCODONE-acetaminophen (PERCOCET) 5-325 mg per tablet Take 1 Tab by mouth every four (4) hours as needed for Pain for up to 5 days. Max Daily Amount: 6 Tabs. 20 Tab 0    ketorolac (TORADOL) 10 mg tablet Take 1 Tab by mouth every six (6) hours as needed for Pain. 20 Tab 0    metFORMIN (GLUCOPHAGE) 1,000 mg tablet Take 1,000 mg by mouth daily.  glipiZIDE (GLUCOTROL) 5 mg tablet Take 5 mg by mouth daily.  predniSONE (STERAPRED DS) 10 mg dose pack Take as directed 48 Tab 0    methocarbamol (ROBAXIN-750) 750 mg tablet Take 1 Tab by mouth four (4) times daily. 15 Tab 0    hydroCHLOROthiazide (HYDRODIURIL) 12.5 mg tablet Take 1 Tab by mouth daily. 30 Tab 0     No current facility-administered medications on file prior to visit. Past Medical History:   Diagnosis Date    Abnormal Pap smear     abnormal pap at 15 y/o normal since     [facility] feb    [facility]    Diabetes [facility])     Diabetes mellitus     Gestational Diabetes with last two pregnancies-diet controlled     Family History   Problem Relation Age of Onset    Diabetes Father     Hypertension Father     Stroke Father     Kidney Disease Father      Social History   Substance Use Topics    Smoking status: Never Smoker    Smokeless tobacco: Never Used    Alcohol use No     /70  Pulse 88  Ht 5' 5\" (1.651 m)  Wt 218 lb (98.9 kg)  SpO2 98%  BMI 36.28 kg/m2      Physical Exam   Constitutional: She is oriented to person, place, and time. She appears well-developed and well-nourished. No distress. HENT:   Head: Normocephalic and atraumatic. Mouth/Throat: Oropharynx is clear and moist. No oropharyngeal exudate. Eyes: Conjunctivae and EOM are normal. Pupils are equal, round, and reactive to light. Right eye exhibits no discharge. No scleral icterus. Neck: Normal range of motion. Neck supple. No thyromegaly present. Cardiovascular: Normal rate, regular rhythm and normal heart sounds. No murmur heard. Musculoskeletal: Normal range of motion. She exhibits no edema, tenderness or deformity. Lymphadenopathy:     She has no cervical adenopathy. Neurological: She is alert and oriented to person, place, and time. She has normal strength and normal reflexes. She displays no atrophy and no tremor. No sensory deficit. She exhibits abnormal muscle tone. She displays a negative Romberg sign. Coordination and gait normal. She displays no Babinski's sign on the right side. She displays no Babinski's sign on the left side. Speech language and mentation are normal   Skin: Skin is warm and dry. No rash noted. She is not diaphoretic. No erythema.    Psychiatric: She has a normal mood and affect. Her behavior is normal. Judgment and thought content normal.   Vitals reviewed. ASSESSMENT and PLAN  CHRONIC LOW BACK PAIN  This patient has chronic low back pain with radiation down both legs with no finding on her lumbar spine MRI scan to explain it. Her exam is normal.  While she has gotten narcotics from several providers I do not believe that narcotics abuse is her primary goal, I suspect she has chronic pain. I offered to put her on gabapentin and continue her Toradol and put her in physical therapy. She was not interested in physical therapy. I doubt she is going to fill the prescriptions. I will make a 2 month follow-up to see her back. This note will not be viewable in [address]

## 2017-08-25 NOTE — PATIENT INSTRUCTIONS
[facility]   Statement to Patients  April 1, 2014      In an effort to ensure the large volume of patient prescription refills is processed in the most efficient and expeditious manner, we are asking our patients to assist us by calling your Pharmacy for all prescription refills, this will include also your  Mail Order Pharmacy. The pharmacy will contact our office electronically to continue the refill process. Please do not wait until the last minute to call your pharmacy. We need at least 48 hours (2days) to fill prescriptions. We also encourage you to call your pharmacy before going to  your prescription to make sure it is ready. With regard to controlled substance prescription refill requests (narcotic refills) that need to be picked up at our office, we ask your cooperation by providing us with at least 72 hours (3days) notice that you will need a refill. We will not refill narcotic prescription refill requests after 4:00pm on any weekday, Monday through Thursday, or after 2:00pm on Fridays, or on the weekends. We encourage everyone to explore another way of getting your prescription refill request processed using [employer], our patient web portal through our electronic medical record system. [employer] is an efficient and effective way to communicate your medication request directly to the office and  downloadable as an jed on your smart phone . [employer] also features a review functionality that allows you to view your medication list as well as leave messages for your physician. Are you ready to get connected? If so please review the attatched instructions or speak to any of our staff to get you set up right away! Thank you so much for your cooperation. Should you have any questions please contact our Practice Administrator.     The Physicians and Staff,  [facility]

## 2017-08-25 NOTE — MR AVS SNAPSHOT
Visit Information Date & Time Provider Department Dept. Phone Encounter #  
 8/25/2017 10:00 AM Emily Zhao MD Neurology Clinic at [facility] [phone] [id number] Follow-up Instructions Return in about 2 months (around 10/25/2017). Your Appointments 10/13/2017  9:40 AM  
Follow Up with Emily Zhao MD  
Neurology Clinic at [facility] [address]) Appt Note: follow up disc $ 0 CP jll 8/25/17  
 [address] [facility], 
[address], [address] [address]  
[address], [address], [address] [address] Upcoming Health Maintenance Date Due  
 LIPID PANEL Q1 1983 FOOT EXAM Q1 7/20/1993 MICROALBUMIN Q1 7/20/1993 EYE EXAM RETINAL OR DILATED Q1 7/20/1993 Pneumococcal 19-64 Medium Risk (1 of 1 - PPSV23) 7/20/2002 DTaP/Tdap/Td series (1 - Tdap) 7/20/2004 PAP AKA CERVICAL CYTOLOGY 7/20/2004 HEMOGLOBIN A1C Q6M 1/21/2016 INFLUENZA AGE 9 TO ADULT 8/1/2017 Allergies as of 8/25/2017  Review Complete On: 8/19/2017 By: Janette Conner RN Severity Noted Reaction Type Reactions Tramadol  05/22/2017    Hives Current Immunizations  Never Reviewed Name Date Influenza Vaccine Doloreschino Hernandez) 11/5/2014 Not reviewed this visit Vitals BP Pulse Height(growth percentile) Weight(growth percentile) SpO2 BMI  
 128/70 88 5' 5\" (1.651 m) 218 lb (98.9 kg) 98% 36.28 kg/m2 OB Status Smoking Status Having regular periods Never Smoker Vitals History BMI and BSA Data Body Mass Index Body Surface Area  
 36.28 kg/m 2 2.13 m 2 Preferred Pharmacy Pharmacy Name Phone [phone] [phone] Your Updated Medication List  
  
   
This list is accurate as of: 8/25/17 11:01 AM.  Always use your most recent med list.  
  
  
  
  
 gabapentin 300 mg capsule Commonly known as:  NEURONTIN Take 1 Cap by mouth three (3) times daily. glipiZIDE 5 mg tablet Commonly known as:  [place] Take 5 mg by mouth daily. hydroCHLOROthiazide 12.5 mg tablet Commonly known as:  HYDRODIURIL Take 1 Tab by mouth daily. * ketorolac 10 mg tablet Commonly known as:  TORADOL Take 1 Tab by mouth every six (6) hours as needed for Pain. * ketorolac 10 mg tablet Commonly known as:  TORADOL Take 1 Tab by mouth every six (6) hours as needed for Pain. Indications: Severe Pain  
  
 metFORMIN 1,000 mg tablet Commonly known as:  GLUCOPHAGE Take 1,000 mg by mouth daily. methocarbamol 750 mg tablet Commonly known as:  [id number] Take 1 Tab by mouth four (4) times daily. oxyCODONE-acetaminophen 5-325 mg per tablet Commonly known as:  PERCOCET Take 1 Tab by mouth every four (4) hours as needed for Pain for up to 5 days. Max Daily Amount: 6 Tabs. predniSONE 10 mg dose pack Commonly known as:  STERAPRED DS Take as directed * Notice: This list has 2 medication(s) that are the same as other medications prescribed for you. Read the directions carefully, and ask your doctor or other care provider to review them with you. Prescriptions Sent to Pharmacy Refills  
 gabapentin (NEURONTIN) 300 mg capsule 2 Sig: Take 1 Cap by mouth three (3) times daily. Class: Normal  
 Pharmacy: [address] Ph #: [phone] Route: Oral  
 ketorolac (TORADOL) 10 mg tablet 2 Sig: Take 1 Tab by mouth every six (6) hours as needed for Pain. Indications: Severe Pain Class: Normal  
 Pharmacy: [address] Ph #: [phone] Route: Oral  
  
Follow-up Instructions Return in about 2 months (around 10/25/2017). Patient Instructions PRESCRIPTION REFILL POLICY [facility] Statement to Patients April 1, 2014 In an effort to ensure the large volume of patient prescription refills is processed in the most efficient and expeditious manner, we are asking our patients to assist us by calling your Pharmacy for all prescription refills, this will include also your  Mail Order Pharmacy. The pharmacy will contact our office electronically to continue the refill process. Please do not wait until the last minute to call your pharmacy. We need at least 48 hours (2days) to fill prescriptions. We also encourage you to call your pharmacy before going to  your prescription to make sure it is ready. With regard to controlled substance prescription refill requests (narcotic refills) that need to be picked up at our office, we ask your cooperation by providing us with at least 72 hours (3days) notice that you will need a refill. We will not refill narcotic prescription refill requests after 4:00pm on any weekday, Monday through Thursday, or after 2:00pm on Fridays, or on the weekends. We encourage everyone to explore another way of getting your prescription refill request processed using Clue App, our patient web portal through our electronic medical record system. Clue App is an efficient and effective way to communicate your medication request directly to the office and  downloadable as an jed on your smart phone . Clue App also features a review functionality that allows you to view your medication list as well as leave messages for your physician. Are you ready to get connected? If so please review the attatched instructions or speak to any of our staff to get you set up right away! Thank you so much for your cooperation. Should you have any questions please contact our Practice Administrator. The Physicians and Staff,  [facility] Introducing [facility]! Gladys Reynolds introduces [facility] patient portal. Now you can access parts of your medical record, email your doctor's office, and request medication refills online. 1. In your internet browser, go to [email or web address] 2. Click on the First Time User? Click Here link in the Sign In box. You will see the New Member Sign Up page. 3. Enter your [facility] Access Code exactly as it appears below. You will not need to use this code after youve completed the sign-up process. If you do not sign up before the expiration date, you must request a new code. · [facility] Access Code: [email or web address] Expires: 11/18/2017  9:14 AM 
 
4. Enter the last four digits of your Social Security Number (xxxx) and Date of Birth (mm/dd/yyyy) as indicated and click Submit. You will be taken to the next sign-up page. 5. Create a [facility] ID. This will be your [facility] login ID and cannot be changed, so think of one that is secure and easy to remember. 6. Create a [facility] password. You can change your password at any time. 7. Enter your Password Reset Question and Answer. This can be used at a later time if you forget your password. 8. Enter your e-mail address. You will receive e-mail notification when new information is available in [address] 9. Click Sign Up. You can now view and download portions of your medical record. 10. Click the Download Summary menu link to download a portable copy of your medical information. If you have questions, please visit the Frequently Asked Questions section of the [employer] [facility] website. Remember, [facility] is NOT to be used for urgent needs. For medical emergencies, dial 911. Now available from your iPhone and Android! Please provide this summary of care documentation to your next provider. Your primary care clinician is listed as Nicky Carlin.  If you have any questions after today's visit, please call [phone].

## 2017-08-26 ENCOUNTER — HOSPITAL ENCOUNTER (EMERGENCY)
Age: 32
Discharge: HOME | End: 2017-08-26
Attending: EMERGENCY MEDICINE
Payer: SELF-PAY

## 2017-08-26 VITALS
RESPIRATION RATE: 18 BRPM | HEIGHT: 65 IN | SYSTOLIC BLOOD PRESSURE: 136 MMHG | HEART RATE: 100 BPM | OXYGEN SATURATION: 100 % | TEMPERATURE: 98.3 F | DIASTOLIC BLOOD PRESSURE: 91 MMHG | BODY MASS INDEX: 35.7 KG/M2 | WEIGHT: 214.29 LBS

## 2017-08-26 DIAGNOSIS — M54.42 CHRONIC BILATERAL LOW BACK PAIN WITH BILATERAL SCIATICA: Primary | ICD-10-CM

## 2017-08-26 DIAGNOSIS — M54.41 CHRONIC BILATERAL LOW BACK PAIN WITH BILATERAL SCIATICA: Primary | ICD-10-CM

## 2017-08-26 DIAGNOSIS — Z76.5 DRUG-SEEKING BEHAVIOR: ICD-10-CM

## 2017-08-26 DIAGNOSIS — Z79.899 CONTROLLED SUBSTANCE AGREEMENT SIGNED: ICD-10-CM

## 2017-08-26 DIAGNOSIS — G89.29 CHRONIC BILATERAL LOW BACK PAIN WITH BILATERAL SCIATICA: Primary | ICD-10-CM

## 2017-08-26 DIAGNOSIS — M51.37 DEGENERATIVE DISC DISEASE AT L5-S1 LEVEL: ICD-10-CM

## 2017-08-26 PROCEDURE — 74011250636 HC RX REV CODE- 250/636: Performed by: PHYSICIAN ASSISTANT

## 2017-08-26 PROCEDURE — 99282 EMERGENCY DEPT VISIT SF MDM: CPT

## 2017-08-26 RX ORDER — METHOCARBAMOL 750 MG/1
750 TABLET, FILM COATED ORAL 4 TIMES DAILY
Qty: 20 TAB | Refills: 0 | Status: SHIPPED | OUTPATIENT
Start: 2017-08-26 | End: 2017-09-08

## 2017-08-26 RX ORDER — KETOROLAC TROMETHAMINE 10 MG/1
10 TABLET, FILM COATED ORAL
Qty: 10 TAB | Refills: 0 | Status: SHIPPED | OUTPATIENT
Start: 2017-08-26 | End: 2017-08-29 | Stop reason: ALTCHOICE

## 2017-08-26 RX ORDER — KETOROLAC TROMETHAMINE 30 MG/ML
30 INJECTION, SOLUTION INTRAMUSCULAR; INTRAVENOUS
Status: COMPLETED | OUTPATIENT
Start: 2017-08-26 | End: 2017-08-26

## 2017-08-26 RX ADMIN — KETOROLAC TROMETHAMINE 30 MG: 30 INJECTION, SOLUTION INTRAMUSCULAR at 15:29

## 2017-08-26 NOTE — ED NOTES
Patient presents to ED ambulatory with c/o chronic lower back pain. Patient reports that she has a neurosurgery appt on Tuesday. She reports she was also seen last week in ED for same sx, but does not have any percocets left at this time.

## 2017-08-26 NOTE — ED NOTES
Prior to leaving room, patient requested to speak with ANDREW Salter, because she is requesting to have narcotics prescribed. Joie notified that patient would like to speak with her.

## 2017-08-26 NOTE — ED PROVIDER NOTES
HPI Comments: Candace Pelletier, 27 y.o. female, with PMHx of DM, chronic back pain presents ambulatory to [address] [place] [address] ED with cc of chronic lower back pain present x months. Pt has presented multiple times to the ED for the same cc. Patient had a CT and MRI done 3 weeks ago at [address] [place] [address] ED due to one her bladder incontinence at that time. CT/MRI results showed DDD. She followed up with Dr. Phani Moser this past week, ismael suggested that she follow-up with neurosurgery and has an appointment coming up on . Today, patient specifically requests something for her pain, which she rates a 6/10 on the pain scale. She states \"it's not that bad, but I know it's going to get bad. \"  She notes that she did take steroid medication for 2 days, but stopped because it was affecting her blood glucose levels. LMP was 2 weeks ago. Patient is on the Depo shot. Pt without complaint of saddle anesthesia or altered sensation when wiping in the perineal/perianal area. Pt denies any episodes of urinary/fecal incontinence. There is no complaint of blood in the urine or stool. Pt denies a ripping or tearing sensation in the abdomen. Pt denies traumatic injury to the back. Pt denies weight loss, pain worse when supine, or night sweats. Pt is able to ambulate without assistance. PCP: Phani Moser NP    Social history significant for: - Tobacco, - EtOH, - Illicit Drug Use    There are no other complaints, changes, or physical findings at this time. The history is provided by the patient. No  was used.         Past Medical History:   Diagnosis Date    Abnormal Pap smear     abnormal pap at 13 y/o normal since     [facility] pain 2015    [facility]  emergency [facility]    Diabetes [facility])     Diabetes mellitus     Gestational Diabetes with last two pregnancies-diet controlled       Past Surgical History:   Procedure Laterality Date    HX  SECTION      HX CHOLECYSTECTOMY      HX OTHER SURGICAL      cholecystectomy  Family History:   Problem Relation Age of Onset    Diabetes Father     Hypertension Father     Stroke Father     Kidney Disease Father        Social History     Social History    Marital status: SINGLE     Spouse name: N/A    Number of children: N/A    Years of education: N/A     Occupational History    Not on file. Social History Main Topics    Smoking status: Never Smoker    Smokeless tobacco: Never Used    Alcohol use No    Drug use: No    Sexual activity: Yes     Birth control/ protection: None     Other Topics Concern    Not on file     Social History Narrative         ALLERGIES: Tramadol    Review of Systems   Constitutional: Negative. Negative for activity change, appetite change, chills, diaphoresis, fever and unexpected weight change. HENT: Negative for congestion, hearing loss, rhinorrhea, sinus pressure, sneezing, sore throat and trouble swallowing. Eyes: Negative for pain, redness, itching and visual disturbance. Respiratory: Negative for cough, shortness of breath and wheezing. Cardiovascular: Negative for chest pain, palpitations and leg swelling. Gastrointestinal: Negative for abdominal pain, constipation, diarrhea, nausea and vomiting. Genitourinary: Negative for dysuria and frequency. Musculoskeletal: Positive for back pain. Negative for arthralgias, gait problem and myalgias. Skin: Negative for color change, pallor, rash and wound. Neurological: Negative for tremors, weakness, light-headedness, numbness and headaches. All other systems reviewed and are negative. Vitals:    08/26/17 1508   BP: (!) 136/91   Pulse: 100   Resp: 18   Temp: 98.3 °F (36.8 °C)   SpO2: 100%   Weight: 97.2 kg (214 lb 4.6 oz)   Height: 5' 5\" (1.651 m)            Physical Exam   Constitutional: She is oriented to person, place, and time. Vital signs are normal. She appears well-developed and well-nourished. No distress. 27 y.o.   female in NAD  Communicates appropriately and in full sentences   HENT:   Head: Normocephalic and atraumatic. Right Ear: External ear normal.   Left Ear: External ear normal.   Mouth/Throat: Oropharynx is clear and moist.   Eyes: Conjunctivae are normal. Pupils are equal, round, and reactive to light. Neck: Normal range of motion. Neck supple. No nuchal rigidity or meningeal signs  No c-spine tenderness  Full APROM of c-spine   Cardiovascular: Intact distal pulses. Pulmonary/Chest: Effort normal. No respiratory distress. Abdominal:   Elevated BMI   Musculoskeletal: Normal range of motion. She exhibits no edema, tenderness or deformity. No neurologic, motor, vascular, or compartment embarrassment observed on exam. No focal neurologic deficits. No focal spinal tenderness  No paraspinal muscular tenderness  No step offs or deformity  Flexion and extension movements are at baseline NVID  Ambulatory without assistance    Neurological: She is alert and oriented to person, place, and time. No cranial nerve deficit. Coordination normal.   No focal neuro deficits. NVI. Neurologically intact of UE and LE B/L  Sensation intact and symmetrical of UE and LE B/L. Strength 5/5 of UE B/L, Strength 5/5 of LE B/L. Symmetric bulk and tone of LE muscle groups. No facial symmetry, and negative pronator drift; finger-nose-finger intact and heel-to-shin intact BL    Skin: Skin is warm and dry. No rash noted. She is not diaphoretic. No erythema. No pallor. No acute overlying skin changes   Psychiatric: She has a normal mood and affect. Nursing note and vitals reviewed. MDM  Number of Diagnoses or Management Options  Chronic bilateral low back pain with bilateral sciatica:   Controlled substance agreement signed:   Degenerative disc disease at L5-S1 level:   Drug-seeking behavior:   Diagnosis management comments: DDx: chronic back pain, sciatica, strain, sprain, spasm, low suspicion for fracture.      28 yo presents with ongoing low back pain. Pt is well-known to [employer] and presents with c/o pain regularly. Imaging recently completed on her back without acute change and patient has stable follow-up, imaging deferred.  reviewied. Pt filled three #20 percocet prescriptions in the last week. I have offered pt non-narcotic options and robaxin. Pt became upset and requested percocet by name for her pain. Reviewied  with patient. Pt states she did not fill the Percocet on 08/24, even though the  states she did. Called [facility] (pharmacy where she filled medication) and they confirmed that hte patient signed for the Rx, and showed her 's License. Pt is concerned that her sister picked up this medication and kept them for herself. Counseled patient to go to [facility] directly or call the authorities. Pt expresses understanding. Provided customary ED return precautions. Amount and/or Complexity of Data Reviewed  Review and summarize past medical records: yes    Patient Progress  Patient progress: stable    ED Course       Procedures    I reviewed our electronic medical record system for any past medical records that were available that may contribute to the patients current condition, the nursing notes and vital signs from today's visit     Nursing notes will be reviewed as they become available in realtime while the pt is in the ED. Progress Note:  3:21 PM  The patients presenting problems have been discussed, and they are in agreement with the care plan formulated and outlined with them. I have encouraged them to ask questions as they arise throughout their visit. Will continue to monitor. 3:24 PM  Pt requesting pain medication with no ride in ED. She has been flagged as a prescription .  reviewed.  In the last week , she has filled: three Percocet, #20 prescriptions    Discussed the safe use of narcotics with the patient as well as common side effects including constipation, nausea, sedation, and drowsiness. Informed pt that they should not operate heavy machinery or a vehicle while taking this drug. Additionally, discussed concern for addiction or overuse with narcotic use. Pt expresses she has no questions about appropriate pain medication use and her concerns have been addressed. Progress Note:  3:30 PM  MRI reviewed. Patient has a stable follow up appointment with Neurology. No evidence of cauda equina or cauda compression. Will discharge with short course of analgesia and muscle relaxant. Progress Note:  3:44 PM  Per  review, pt filled a narcotic Rx 2 days ago. Pt denies this and states that she did not fill this Rx. After speaking with [address], they confirmed that Ms Earma Mcburney presented her ID and received controlled substances on August 24th. Informed patient of this, patient is concerned. Informed patient that should she have further concerns to go to [facility] and speak directly in person with pharmacist, and that if she has further concerns regarding pharmacy prescription stealing, to contact the Police. Pt is ambulatory in the ED without assistance. DISCHARGE NOTE:  4:00 PM  Wiliam Trammell  results have been reviewed with her. She has been counseled regarding her diagnosis. She verbally conveys understanding and agreement of the signs, symptoms, diagnosis, treatment and prognosis and additionally agrees to follow up as recommended with Dr. Katie Echeverria, NGHIA in 24 - 48 hours. She also agrees with the care-plan and conveys that all of her questions have been answered. I have also put together some discharge instructions for her that include: 1) educational information regarding their diagnosis, 2) how to care for their diagnosis at home, as well a 3) list of reasons why they would want to return to the ED prior to their follow-up appointment, should their condition change. She and/or family's questions have been answered.  I have encouraged them to see the official results in \"My Chart\" or to retrieve the specifics of their results from medical records. CLINICAL IMPRESSION:  1. Chronic bilateral low back pain with bilateral sciatica    2. Degenerative disc disease at L5-S1 level    3. Drug-seeking behavior    4. Controlled substance agreement signed        Plan:  1. Return precautions  2. Medications as prescribed  3. Follow-ups as discussed  Discharge Medication List as of 8/26/2017  3:27 PM      CONTINUE these medications which have CHANGED    Details   methocarbamol (ROBAXIN-750) 750 mg tablet Take 1 Tab by mouth four (4) times daily. Indications: MUSCLE SPASM, Normal, Disp-20 Tab, R-0      ketorolac (TORADOL) 10 mg tablet Take 1 Tab by mouth every six (6) hours as needed for Pain., Normal, Disp-10 Tab, R-0         CONTINUE these medications which have NOT CHANGED    Details   metFORMIN (GLUCOPHAGE) 1,000 mg tablet Take 1,000 mg by mouth daily. , Historical Med      glipiZIDE (GLUCOTROL) 5 mg tablet Take 5 mg by mouth daily. , Historical Med      gabapentin (NEURONTIN) 300 mg capsule Take 1 Cap by mouth three (3) times daily. , Normal, Disp-90 Cap, R-2      predniSONE (STERAPRED DS) 10 mg dose pack Take as directed, Print, Disp-48 Tab, R-0      hydroCHLOROthiazide (HYDRODIURIL) 12.5 mg tablet Take 1 Tab by mouth daily. , Print, Disp-30 Tab, R-0         STOP taking these medications       oxyCODONE-acetaminophen (PERCOCET) 5-325 mg per tablet Comments:   Reason for Stopping:              Follow-up Information     Follow up With Details Comments Contact Info    Jerzy Mitchell NP Schedule an appointment as soon as possible for a visit in 2 days As needed, If symptoms worsen, Possible further evaluation and treatment [address] [facility]  [phone]      [facility] EMERGENCY DEPT Go to As needed, If symptoms worsen [address] Harley Amador MD Schedule an appointment as soon as possible for a visit in 2 days As needed, If symptoms worsen, Possible further evaluation and treatment [facility]  [address]  [phone]          Return to the closest emergency room or follow up sooner for any deterioration      This note will not be viewable in MyChart. This note is prepared by Huma Houston, acting as a Scribe for S B E, [employer]. S B E, BRIA: The scribe's documentation has been prepared under my direction and personally reviewed by me in its entirety. I confirm that the notes above accurately reflects all work, treatment, procedures, and medical decision making performed by me.

## 2017-08-26 NOTE — ED NOTES
Patient discharged by [facility]. Patient provided with discharge instructions Rx and instructions on follow up care. Patient out of ED ambulatory in no acute distress accompanied by daughter.

## 2017-08-26 NOTE — DISCHARGE INSTRUCTIONS
Learning About How to Have a Healthy Back  What causes back pain? Back pain is often caused by overuse, strain, or injury. For example, people often hurt their backs playing sports or working in the yard, being jolted in a car accident, or lifting something too heavy. Aging plays a part too. Your bones and muscles tend to lose strength as you age, which makes injury more likely. The spongy discs between the bones of the spine (vertebrae) may suffer from wear and tear and no longer provide enough cushion between the bones. A disc that bulges or breaks open (herniated disc) can press on nerves, causing back pain. In some people, back pain is the result of arthritis, broken vertebrae caused by bone loss (osteoporosis), illness, or a spine problem. Although most people have back pain at one time or another, there are steps you can take to make it less likely. How can you have a healthy back? Reduce stress on your back through good posture  Slumping or slouching alone may not cause low back pain. But after the back has been strained or injured, bad posture can make pain worse. · Sleep in a position that maintains your back's normal curves and on a mattress that feels comfortable. Sleep on your side with a pillow between your knees, or sleep on your back with a pillow under your knees. These positions can reduce strain on your back. · Stand and sit up straight. \"Good posture\" generally means your ears, shoulders, and hips are in a straight line. · If you must stand for a long time, put one foot on a stool, ledge, or box. Switch feet every now and then. · Sit in a chair that is low enough to let you place both feet flat on the floor with both knees nearly level with your hips. If your chair or desk is too high, use a footrest to raise your knees. Place a small pillow, a rolled-up towel, or a lumbar roll in the curve of your back if you need extra support.   · Try a kneeling chair, which helps tilt your hips forward. This takes pressure off your lower back. · Try sitting on an exercise ball. It can rock from side to side, which helps keep your back loose. · When driving, keep your knees nearly level with your hips. Sit straight, and drive with both hands on the steering wheel. Your arms should be in a slightly bent position. Reduce stress on your back through careful lifting  · Squat down, bending at the hips and knees only. If you need to, put one knee to the floor and extend your other knee in front of you, bent at a right angle (half kneeling). · Press your chest straight forward. This helps keep your upper back straight while keeping a slight arch in your low back. · Hold the load as close to your body as possible, at the level of your belly button (navel). · Use your feet to change direction, taking small steps. · Lead with your hips as you change direction. Keep your shoulders in line with your hips as you move. · Set down your load carefully, squatting with your knees and hips only. Exercise and stretch your back  · Do some exercise on most days of the week, if your doctor says it is okay. You can walk, run, swim, or cycle. · Stretch your back muscles. Here are a few exercises to try:  Yara Loges on your back, and gently pull one bent knee to your chest. Put that foot back on the floor, and then pull the other knee to your chest.  ¨ Do pelvic tilts. Lie on your back with your knees bent. Tighten your stomach muscles. Pull your belly button (navel) in and up toward your ribs. You should feel like your back is pressing to the floor and your hips and pelvis are slightly lifting off the floor. Hold for 6 seconds while breathing smoothly. ¨ Sit with your back flat against a wall. · Keep your core muscles strong. The muscles of your back, belly (abdomen), and buttocks support your spine. ¨ Pull in your belly and imagine pulling your navel toward your spine. Hold this for 6 seconds, then relax.  Remember to keep breathing normally as you tense your muscles. ¨ Do curl-ups. Always do them with your knees bent. Keep your low back on the floor, and curl your shoulders toward your knees using a smooth, slow motion. Keep your arms folded across your chest. If this bothers your neck, try putting your hands behind your neck (not your head), with your elbows spread apart. ¨ Lie on your back with your knees bent and your feet flat on the floor. Tighten your belly muscles, and then push with your feet and raise your buttocks up a few inches. Hold this position 6 seconds as you continue to breathe normally, then lower yourself slowly to the floor. Repeat 8 to 12 times. ¨ If you like group exercise, try Pilates or yoga. These classes have poses that strengthen the core muscles. Lead a healthy lifestyle  · Stay at a healthy weight to avoid strain on your back. · Do not smoke. Smoking increases the risk of osteoporosis, which weakens the spine. If you need help quitting, talk to your doctor about stop-smoking programs and medicines. These can increase your chances of quitting for good. Where can you learn more? Go to http://asael"[employer]"sloan.info/. Enter L315 in the search box to learn more about \"Learning About How to Have a Healthy Back. \"  Current as of: March 21, 2017  Content Version: 11.3  © 3288-4636 [employer]. Care instructions adapted under license by [employer] (which disclaims liability or warranty for this information). If you have questions about a medical condition or this instruction, always ask your healthcare professional. [place] [address] any warranty or liability for your use of this information. Back Pain: Care Instructions  Your Care Instructions    Back pain has many possible causes. It is often related to problems with muscles and ligaments of the back. It may also be related to problems with the nerves, discs, or bones of the back.  Moving, lifting, standing, sitting, or sleeping in an awkward way can strain the back. Sometimes you don't notice the injury until later. Arthritis is another common cause of back pain. Although it may hurt a lot, back pain usually improves on its own within several weeks. Most people recover in 12 weeks or less. Using good home treatment and being careful not to stress your back can help you feel better sooner. Follow-up care is a key part of your treatment and safety. Be sure to make and go to all appointments, and call your doctor if you are having problems. Its also a good idea to know your test results and keep a list of the medicines you take. How can you care for yourself at home? · Sit or lie in positions that are most comfortable and reduce your pain. Try one of these positions when you lie down:  ¨ Lie on your back with your knees bent and supported by large pillows. ¨ Lie on the floor with your legs on the seat of a sofa or chair. Terressa Gutter on your side with your knees and hips bent and a pillow between your legs. ¨ Lie on your stomach if it does not make pain worse. · Do not sit up in bed, and avoid soft couches and twisted positions. Bed rest can help relieve pain at first, but it delays healing. Avoid bed rest after the first day of back pain. · Change positions every 30 minutes. If you must sit for long periods of time, take breaks from sitting. Get up and walk around, or lie in a comfortable position. · Try using a heating pad on a low or medium setting for 15 to 20 minutes every 2 or 3 hours. Try a warm shower in place of one session with the heating pad. · You can also try an ice pack for 10 to 15 minutes every 2 to 3 hours. Put a thin cloth between the ice pack and your skin. · Take pain medicines exactly as directed. ¨ If the doctor gave you a prescription medicine for pain, take it as prescribed.   ¨ If you are not taking a prescription pain medicine, ask your doctor if you can take an over-the-counter medicine. · Take short walks several times a day. You can start with 5 to 10 minutes, 3 or 4 times a day, and work up to longer walks. Walk on level surfaces and avoid hills and stairs until your back is better. · Return to work and other activities as soon as you can. Continued rest without activity is usually not good for your back. · To prevent future back pain, do exercises to stretch and strengthen your back and stomach. Learn how to use good posture, safe lifting techniques, and proper body mechanics. When should you call for help? Call your doctor now or seek immediate medical care if:  · You have new or worsening numbness in your legs. · You have new or worsening weakness in your legs. (This could make it hard to stand up.)  · You lose control of your bladder or bowels. Watch closely for changes in your health, and be sure to contact your doctor if:  · Your pain gets worse. · You are not getting better after 2 weeks. Where can you learn more? Go to http://asael-sloan.info/. Enter V039 in the search box to learn more about \"Back Pain: Care Instructions. \"  Current as of: March 21, 2017  Content Version: 11.3  © 2488-7529 [employer]. Care instructions adapted under license by [employer] (which disclaims liability or warranty for this information). If you have questions about a medical condition or this instruction, always ask your healthcare professional. [place] [address] any warranty or liability for your use of this information. Learning About Relief for Back Pain  What is back tension and strain? Back strain happens when you overstretch, or pull, a muscle in your back. You may hurt your back in an accident or when you exercise or lift something. Most back pain will get better with rest and time. You can take care of yourself at home to help your back heal.  What can you do first to relieve back pain?   When you first feel back pain, try these steps:  · Walk. Take a short walk (10 to 20 minutes) on a level surface (no slopes, hills, or stairs) every 2 to 3 hours. Walk only distances you can manage without pain, especially leg pain. · Relax. Find a comfortable position for rest. Some people are comfortable on the floor or a medium-firm bed with a small pillow under their head and another under their knees. Some people prefer to lie on their side with a pillow between their knees. Don't stay in one position for too long. · Try heat or ice. Try using a heating pad on a low or medium setting, or take a warm shower, for 15 to 20 minutes every 2 to 3 hours. Or you can buy single-use heat wraps that last up to 8 hours. You can also try an ice pack for 10 to 15 minutes every 2 to 3 hours. You can use an ice pack or a bag of frozen vegetables wrapped in a thin towel. There is not strong evidence that either heat or ice will help, but you can try them to see if they help. You may also want to try switching between heat and cold. · Take pain medicine exactly as directed. ¨ If the doctor gave you a prescription medicine for pain, take it as prescribed. ¨ If you are not taking a prescription pain medicine, ask your doctor if you can take an over-the-counter medicine. What else can you do? · Stretch and exercise. Exercises that increase flexibility may relieve your pain and make it easier for your muscles to keep your spine in a good, neutral position. And don't forget to keep walking. · Do self-massage. You can use self-massage to unwind after work or school or to energize yourself in the morning. You can easily massage your feet, hands, or neck. Self-massage works best if you are in comfortable clothes and are sitting or lying in a comfortable position. Use oil or lotion to massage bare skin. · Reduce stress.  Back pain can lead to a vicious Eek: Distress about the pain tenses the muscles in your back, which in turn causes more pain. Learn how to relax your mind and your muscles to lower your stress. Where can you learn more? Go to http://asael-sloan.info/. Enter C987 in the search box to learn more about \"Learning About Relief for Back Pain. \"  Current as of: March 21, 2017  Content Version: 11.3  © 0687-7767 [employer]. Care instructions adapted under license by [employer] (which disclaims liability or warranty for this information). If you have questions about a medical condition or this instruction, always ask your healthcare professional. [place] [address] any warranty or liability for your use of this information.

## 2017-08-29 ENCOUNTER — HOSPITAL ENCOUNTER (EMERGENCY)
Age: 32
Discharge: HOME | End: 2017-08-29
Attending: EMERGENCY MEDICINE
Payer: SELF-PAY

## 2017-08-29 VITALS
BODY MASS INDEX: 35.96 KG/M2 | WEIGHT: 215.83 LBS | HEIGHT: 65 IN | RESPIRATION RATE: 16 BRPM | TEMPERATURE: 98.4 F | OXYGEN SATURATION: 100 % | HEART RATE: 96 BPM | SYSTOLIC BLOOD PRESSURE: 146 MMHG | DIASTOLIC BLOOD PRESSURE: 81 MMHG

## 2017-08-29 DIAGNOSIS — M54.50 ACUTE EXACERBATION OF CHRONIC LOW BACK PAIN: ICD-10-CM

## 2017-08-29 DIAGNOSIS — G89.29 ACUTE EXACERBATION OF CHRONIC LOW BACK PAIN: ICD-10-CM

## 2017-08-29 DIAGNOSIS — M51.37 DEGENERATIVE DISC DISEASE AT L5-S1 LEVEL: Primary | ICD-10-CM

## 2017-08-29 PROCEDURE — 99282 EMERGENCY DEPT VISIT SF MDM: CPT

## 2017-08-29 RX ORDER — DICLOFENAC SODIUM 75 MG/1
75 TABLET, DELAYED RELEASE ORAL 2 TIMES DAILY
Qty: 20 TAB | Refills: 0 | Status: SHIPPED | OUTPATIENT
Start: 2017-08-29 | End: 2017-09-08

## 2017-08-29 RX ORDER — OXYCODONE AND ACETAMINOPHEN 5; 325 MG/1; MG/1
1 TABLET ORAL
Qty: 20 TAB | Refills: 0 | Status: SHIPPED | OUTPATIENT
Start: 2017-08-29 | End: 2017-09-08
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
## 2017-08-29 NOTE — DISCHARGE INSTRUCTIONS
Learning About Relief for Back Pain  What is back tension and strain? Back strain happens when you overstretch, or pull, a muscle in your back. You may hurt your back in an accident or when you exercise or lift something. Most back pain will get better with rest and time. You can take care of yourself at home to help your back heal.  What can you do first to relieve back pain? When you first feel back pain, try these steps:  · Walk. Take a short walk (10 to 20 minutes) on a level surface (no slopes, hills, or stairs) every 2 to 3 hours. Walk only distances you can manage without pain, especially leg pain. · Relax. Find a comfortable position for rest. Some people are comfortable on the floor or a medium-firm bed with a small pillow under their head and another under their knees. Some people prefer to lie on their side with a pillow between their knees. Don't stay in one position for too long. · Try heat or ice. Try using a heating pad on a low or medium setting, or take a warm shower, for 15 to 20 minutes every 2 to 3 hours. Or you can buy single-use heat wraps that last up to 8 hours. You can also try an ice pack for 10 to 15 minutes every 2 to 3 hours. You can use an ice pack or a bag of frozen vegetables wrapped in a thin towel. There is not strong evidence that either heat or ice will help, but you can try them to see if they help. You may also want to try switching between heat and cold. · Take pain medicine exactly as directed. ¨ If the doctor gave you a prescription medicine for pain, take it as prescribed. ¨ If you are not taking a prescription pain medicine, ask your doctor if you can take an over-the-counter medicine. What else can you do? · Stretch and exercise. Exercises that increase flexibility may relieve your pain and make it easier for your muscles to keep your spine in a good, neutral position. And don't forget to keep walking. · Do self-massage.  You can use self-massage to unwind after work or school or to energize yourself in the morning. You can easily massage your feet, hands, or neck. Self-massage works best if you are in comfortable clothes and are sitting or lying in a comfortable position. Use oil or lotion to massage bare skin. · Reduce stress. Back pain can lead to a vicious [place]: Distress about the pain tenses the muscles in your back, which in turn causes more pain. Learn how to relax your mind and your muscles to lower your stress. Where can you learn more? Go to http://asael-sloan.info/. Enter X740 in the search box to learn more about \"Learning About Relief for Back Pain. \"  Current as of: March 21, 2017  Content Version: 11.3  © 5648-0276 [employer]. Care instructions adapted under license by [employer] (which disclaims liability or warranty for this information). If you have questions about a medical condition or this instruction, always ask your healthcare professional. [place] [address] any warranty or liability for your use of this information. Learning About Degenerative Disc Disease  What is degenerative disc disease? Degenerative disc disease is not really a disease. It's a term used to describe the normal changes in your spinal discs as you age. Spinal discs are small, spongy discs that separate the bones (vertebrae) that make up the spine. The discs act as shock absorbers for the spine, so it can flex, bend, and twist.  Degenerative disc disease can take place in one or more places along the spine. It most often occurs in the discs in the lower back and the neck. What causes it? As we age, our spinal discs break down, or degenerate. This breakdown causes the symptoms of degenerative disc disease in some people. When the discs break down, they can lose fluid and dry out, and their outer layers can have tiny cracks or tears.  This leads to less padding and less space between the bones in the spine. The body reacts to this by making bony growths on the spine called bone spurs. These spurs can press on the spinal nerve roots or spinal cord. This can cause pain and can affect how well the nerves work. What are the symptoms? Many people with degenerative disc disease have no pain. But others have severe pain or other symptoms that limit their activities. Some of the most common symptoms are:  · Pain in the back or neck. Where the pain occurs depends on which discs are affected. · Pain that gets worse when you move, such as bending over, reaching up, or twisting. · Pain that may occur in the rear end (buttocks), arm, or leg if a nerve is pinched. · Numbness or tingling in your arm or leg. How is it diagnosed? A doctor can often diagnose degenerative disc disease while doing a physical exam. If your exam shows no signs of a serious condition, imaging tests (such as an X-ray) aren't likely to help your doctor find the cause of your symptoms. Sometimes degenerative disc disease is found when an X-ray is taken for another reason, such as an injury or other health problem. But even if the doctor finds degenerative disc disease, that doesn't always mean that you will have symptoms. How is it treated? There are several things you can do at home to manage pain from this problem. · To relieve pain, use ice or heat (whichever feels better) on the affected area. ¨ Put ice or a cold pack on the area for 10 to 20 minutes at a time. Put a thin cloth between the ice and your skin. ¨ Put a warm water bottle, a heating pad set on low, or a warm cloth on your back. Put a thin cloth between the heating pad and your skin. Do not go to sleep with a heating pad on your skin. · Ask your doctor if you can take acetaminophen (such as Tylenol) or nonsteroidal anti-inflammatory drugs, such as ibuprofen or naproxen. Your doctor can prescribe stronger medicines if needed. Be safe with medicines.  Read and follow all instructions on the label. · Get some exercise every day. Exercise is one of the best ways to help your back feel better and stay better. It's best to start each exercise slowly. You may notice a little soreness, and that's okay. But if an exercise makes your pain worse, stop doing it. Here are things you can try:  ¨ Walking. It's the simplest and maybe the best activity for your back. It gets your blood moving and helps your muscles stay strong. ¨ Exercises that gently stretch and strengthen your stomach, back, and leg muscles. The stronger those muscles are, the better they're able to protect your back. If you have constant or severe pain in your back or spine, you may need other treatments, such as physical therapy. In some cases, your doctor may suggest surgery. Follow-up care is a key part of your treatment and safety. Be sure to make and go to all appointments, and call your doctor if you are having problems. It's also a good idea to know your test results and keep a list of the medicines you take. Where can you learn more? Go to http://asael-sloan.info/. Enter E611 in the search box to learn more about \"Learning About Degenerative Disc Disease. \"  Current as of: March 21, 2017  Content Version: 11.3  © 2002-4702 [employer]. Care instructions adapted under license by [employer] (which disclaims liability or warranty for this information). If you have questions about a medical condition or this instruction, always ask your healthcare professional. [place] [address] any warranty or liability for your use of this information. Chronic Pain: Care Instructions  Your Care Instructions  Chronic pain is pain that lasts a long time (months or even years) and may or may not have a clear cause. It is different from acute pain, which usually does have a clear cause--like an injury or illness--and gets better over time.  Chronic pain:  · Lasts over time but may vary from day to day. · Does not go away despite efforts to end it. · May disrupt your sleep and lead to fatigue. · May cause depression or anxiety. · May make your muscles tense, causing more pain. · Can disrupt your work, hobbies, home life, and relationships with friends and family. Chronic pain is a very real condition. It is not just in your head. Treatment can help and usually includes several methods used together, such as medicines, physical therapy, exercise, and other treatments. Learning how to relax and changing negative thought patterns can also help you cope. Chronic pain is complex. Taking an active role in your treatment will help you better manage your pain. Tell your doctor if you have trouble dealing with your pain. You may have to try several things before you find what works best for you. Follow-up care is a key part of your treatment and safety. Be sure to make and go to all appointments, and call your doctor if you are having problems. Its also a good idea to know your test results and keep a list of the medicines you take. How can you care for yourself at home? · Pace yourself. Break up large jobs into smaller tasks. Save harder tasks for days when you have less pain, or go back and forth between hard tasks and easier ones. Take rest breaks. · Relax, and reduce stress. Relaxation techniques such as deep breathing or meditation can help. · Keep moving. Gentle, daily exercise can help reduce pain over the long run. Try low- or no-impact exercises such as walking, swimming, and stationary biking. Do stretches to stay flexible. · Try heat, cold packs, and massage. · Get enough sleep. Chronic pain can make you tired and drain your energy. Talk with your doctor if you have trouble sleeping because of pain. · Think positive. Your thoughts can affect your pain level. Do things that you enjoy to distract yourself when you have pain instead of focusing on the pain.  See a movie, read a book, listen to music, or spend time with a friend. · If you think you are depressed, talk to your doctor about treatment. · Keep a daily pain diary. Record how your moods, thoughts, sleep patterns, activities, and medicine affect your pain. You may find that your pain is worse during or after certain activities or when you are feeling a certain emotion. Having a record of your pain can help you and your doctor find the best ways to treat your pain. · Take pain medicines exactly as directed. ¨ If the doctor gave you a prescription medicine for pain, take it as prescribed. ¨ If you are not taking a prescription pain medicine, ask your doctor if you can take an over-the-counter medicine. Reducing constipation caused by pain medicine  · Include fruits, vegetables, beans, and whole grains in your diet each day. These foods are high in fiber. · Drink plenty of fluids, enough so that your urine is light yellow or clear like water. If you have kidney, heart, or liver disease and have to limit fluids, talk with your doctor before you increase the amount of fluids you drink. · If your doctor recommends it, get more exercise. Walking is a good choice. Bit by bit, increase the amount you walk every day. Try for at least 30 minutes on most days of the week. · Schedule time each day for a bowel movement. A daily routine may help. Take your time and do not strain when having a bowel movement. When should you call for help? Call your doctor now or seek immediate medical care if:  · Your pain gets worse or is out of control. · You feel down or blue, or you do not enjoy things like you once did. You may be depressed, which is common in people with chronic pain. Depression can be treated. · You have vomiting or cramps for more than 2 hours. Watch closely for changes in your health, and be sure to contact your doctor if:  · You cannot sleep because of pain.   · You are very worried or anxious about your pain.  · You have trouble taking your pain medicine. · You have any concerns about your pain medicine. · You have trouble with bowel movements, such as:  ¨ No bowel movement in 3 days. ¨ Blood in the anal area, in your stool, or on the toilet paper. ¨ Diarrhea for more than 24 hours. Where can you learn more? Go to http://asael-sloan.info/. Enter N004 in the search box to learn more about \"Chronic Pain: Care Instructions. \"  Current as of: October 14, 2016  Content Version: 11.3  © 7767-6914 [employer]. Care instructions adapted under license by [employer] (which disclaims liability or warranty for this information). If you have questions about a medical condition or this instruction, always ask your healthcare professional. [address] any warranty or liability for your use of this information.

## 2017-08-29 NOTE — LETTER
[address] 
[facility] EMERGENCY DEPT 
[address] [place] [address] [address] [id number] [phone] Work/School Note Date: 8/29/2017 To Whom It May concern: 
 
Andrew Mcclelland was seen and treated today in the emergency room by the following provider(s): 
Attending Provider: Bridget Barcenas MD 
Physician Assistant: ANDREW Epstein. Andrew Mcclelland may return to work on 9/1/17. Should should not lift anything greater than 5lbs until 9/8/17. Sincerely, ANDREW Epstein

## 2017-09-08 ENCOUNTER — HOSPITAL ENCOUNTER (EMERGENCY)
Age: 32
Discharge: LEFT BEFORE DISCHARGE | End: 2017-09-08
Attending: EMERGENCY MEDICINE
Payer: SELF-PAY

## 2017-09-08 VITALS
TEMPERATURE: 98.2 F | BODY MASS INDEX: 34.71 KG/M2 | HEART RATE: 92 BPM | DIASTOLIC BLOOD PRESSURE: 98 MMHG | HEIGHT: 65 IN | RESPIRATION RATE: 14 BRPM | SYSTOLIC BLOOD PRESSURE: 150 MMHG | WEIGHT: 208.34 LBS | OXYGEN SATURATION: 100 %

## 2017-09-08 DIAGNOSIS — Z53.21 PATIENT LEFT WITHOUT BEING SEEN: Primary | ICD-10-CM

## 2017-09-08 PROCEDURE — 75810000275 HC EMERGENCY DEPT VISIT NO LEVEL OF CARE
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
## 2017-09-08 NOTE — ED NOTES
Patient has not been seen since she stated that she was going to the bathroom. Assuming patient eloped.

## 2017-09-08 NOTE — ED PROVIDER NOTES
Patient is a 27 y.o. female presenting with shoulder pain. Shoulder Pain           Past Medical History:   Diagnosis Date    Abnormal Pap smear     abnormal pap at 15 y/o normal since     Back pain 2015    [facility]  emergency [facility]     Diabetes mellitus     Gestational Diabetes with last two pregnancies-diet controlled       Past Surgical History:   Procedure Laterality Date    HX  SECTION      HX CHOLECYSTECTOMY      HX OTHER SURGICAL      cholecystectomy          Family History:   Problem Relation Age of Onset    Diabetes Father     Hypertension Father     Stroke Father     Kidney Disease Father        Social History     Social History    Marital status: SINGLE     Spouse name: N/A    Number of children: N/A    Years of education: N/A     Occupational History    Not on file. Social History Main Topics    Smoking status: Never Smoker    Smokeless tobacco: Never Used    Alcohol use No    Drug use: No    Sexual activity: Yes     Birth control/ protection: None     Other Topics Concern    Not on file     Social History Narrative         ALLERGIES: Tramadol    Review of Systems    Vitals:    17 1313   BP: (!) 150/98   Pulse: 92   Resp: 14   Temp: 98.2 °F (36.8 °C)   SpO2: 100%   Weight: 94.5 kg (208 lb 5.4 oz)   Height: 5' 5\" (1.651 m)            Physical Exam     [facility]  ED Course       Procedures     Progress Note:  1:55 PM  Went to pt's room for initial interview but pt was not present. Pt has eloped. Pt did not complain or tell staff that she was leaving.   4:49 PM

## 2017-09-21 ENCOUNTER — HOSPITAL ENCOUNTER (EMERGENCY)
Age: 32
Discharge: HOME | End: 2017-09-21
Attending: FAMILY MEDICINE

## 2017-09-21 VITALS
RESPIRATION RATE: 18 BRPM | SYSTOLIC BLOOD PRESSURE: 139 MMHG | BODY MASS INDEX: 34.32 KG/M2 | OXYGEN SATURATION: 97 % | WEIGHT: 206 LBS | TEMPERATURE: 98.6 F | DIASTOLIC BLOOD PRESSURE: 100 MMHG | HEIGHT: 65 IN | HEART RATE: 90 BPM

## 2017-09-21 DIAGNOSIS — E11.41 DIABETIC MONONEUROPATHY ASSOCIATED WITH TYPE 2 DIABETES MELLITUS (HCC): Primary | ICD-10-CM

## 2017-09-21 NOTE — UC PROVIDER NOTE
Patient is a 27 y.o. female presenting with foot pain. The history is provided by the patient. Foot Pain    This is a new problem. The current episode started more than 1 week ago. The problem occurs constantly. The problem has not changed since onset. The pain is present in the right foot and left foot. The quality of the pain is described as aching. Associated symptoms include numbness. Treatments tried: Taking Gabapentin BID. The treatment provided mild relief. There has been no history of extremity trauma. Past Medical History:   Diagnosis Date    Abnormal Pap smear     abnormal pap at 15 y/o normal since     Back pain 2015    [facility]  emergency [facility]    Diabetes [facility])     Diabetes mellitus     Gestational Diabetes with last two pregnancies-diet controlled        Past Surgical History:   Procedure Laterality Date    HX  SECTION      HX CHOLECYSTECTOMY      HX OTHER SURGICAL      cholecystectomy 2002         Family History   Problem Relation Age of Onset    Diabetes Father     Hypertension Father     Stroke Father     Kidney Disease Father         Social History     Social History    Marital status: SINGLE     Spouse name: N/A    Number of children: N/A    Years of education: N/A     Occupational History    Not on file. Social History Main Topics    Smoking status: Never Smoker    Smokeless tobacco: Never Used    Alcohol use No    Drug use: No    Sexual activity: Yes     Birth control/ protection: None     Other Topics Concern    Not on file     Social History Narrative                ALLERGIES: Tramadol    Review of Systems   Constitutional: Negative for activity change. Respiratory: Negative for shortness of breath. Cardiovascular: Negative for chest pain. Neurological: Positive for numbness.        Vitals:    17 1729   BP: (!) 139/100   Pulse: 90   Resp: 18   Temp: 98.6 °F (37 °C)   SpO2: 97%   Weight: 93.4 kg (206 lb)   Height: 5' 5\" (1.651 m) Physical Exam   Constitutional: She is oriented to person, place, and time. She appears well-developed and well-nourished. Eyes: Conjunctivae and EOM are normal.   Pulmonary/Chest: Effort normal.   Neurological: She is alert and oriented to person, place, and time. Skin: Skin is warm and dry. Psychiatric: She has a normal mood and affect. Her behavior is normal. Judgment and thought content normal.   Nursing note and vitals reviewed. MDM     Differential Diagnosis; Clinical Impression; Plan:     CLINICAL IMPRESSION:  Diabetic mononeuropathy associated with type 2 diabetes mellitus ([facility] [address]  (primary encounter diagnosis)    Plan:  1. Gabapentin TID as prescribed. Pt was not taking correctly  2. May supplement with OTC Tylenol  3. Risk of Significant Complications, Morbidity, and/or Mortality:   Presenting problems: Moderate  Diagnostic procedures: Moderate  Management options:   Moderate  Progress:   Patient progress:  Stable      Procedures

## 2017-11-21 ENCOUNTER — HOSPITAL ENCOUNTER (EMERGENCY)
Age: 32
Discharge: HOME | End: 2017-11-21
Attending: FAMILY MEDICINE

## 2017-11-21 VITALS
RESPIRATION RATE: 16 BRPM | HEIGHT: 65 IN | OXYGEN SATURATION: 97 % | HEART RATE: 86 BPM | BODY MASS INDEX: 35.89 KG/M2 | TEMPERATURE: 98.6 F | DIASTOLIC BLOOD PRESSURE: 95 MMHG | WEIGHT: 215.4 LBS | SYSTOLIC BLOOD PRESSURE: 156 MMHG

## 2017-11-21 DIAGNOSIS — K08.89 TOOTH PAIN: ICD-10-CM

## 2017-11-21 DIAGNOSIS — S02.5XXA CLOSED FRACTURE OF TOOTH, INITIAL ENCOUNTER: Primary | ICD-10-CM

## 2017-11-21 RX ORDER — IBUPROFEN 800 MG/1
800 TABLET ORAL
Qty: 20 TAB | Refills: 0 | Status: SHIPPED | OUTPATIENT
Start: 2017-11-21 | End: 2017-11-28

## 2017-11-21 RX ORDER — AMOXICILLIN 875 MG/1
875 TABLET, FILM COATED ORAL 2 TIMES DAILY
Qty: 20 TAB | Refills: 0 | Status: SHIPPED | OUTPATIENT
Start: 2017-11-21 | End: 2017-12-01

## 2017-11-21 RX ORDER — HYDROCODONE BITARTRATE AND ACETAMINOPHEN 7.5; 325 MG/1; MG/1
1 TABLET ORAL
Qty: 20 TAB | Refills: 0 | Status: SHIPPED | OUTPATIENT
Start: 2017-11-21 | End: 2017-11-21 | Stop reason: CLARIF

## 2017-11-21 NOTE — DISCHARGE INSTRUCTIONS
Broken Tooth: Care Instructions  Your Care Instructions  A tooth can be chipped, broken, or knocked out during sports, an accident, or a bad fall. Your doctor may have fixed your tooth temporarily. You also may have been given pain medicine. If you had signs of infection, you may need to take antibiotics. You will need to see a dentist. If you have chipped a tooth, it may be jagged, which can irritate your mouth and tongue. The dentist may smooth the edges and fill in the part that chipped off. A permanent tooth that has been knocked out can be put back in (reimplanted) if it is done quickly. The dentist may need to put a crown on a broken tooth to cover the tooth and hold it together. Prompt dental treatment can often prevent infection in the tooth. Follow-up care is a key part of your treatment and safety. Be sure to make and go to all appointments, and call your doctor if you are having problems. It's also a good idea to know your test results and keep a list of the medicines you take. How can you care for yourself at home? · If your tooth pulp is exposed, you can protect it by putting temporary filling material over the broken area. You can buy temporary filling mixes in drugstores. Follow the directions on the label. · To relieve pain and swelling, put ice or a cold cloth on the tooth's gum or cheek area, or suck on a piece of ice. But if the tooth's nerve or pulp is exposed, avoid putting anything too hot or cold near the tooth until you see your dentist.  · Ask your doctor if you can take an over-the-counter pain medicine, such as acetaminophen (Tylenol), ibuprofen (Advil, Motrin), or naproxen (Aleve). Be safe with medicines. Read and follow all instructions on the label. · If your doctor prescribed antibiotics, take them as directed. Do not stop taking them just because you feel better. You need to take the full course of antibiotics.   · To help healing, rinse your mouth with warm salt water right after meals. To make a saltwater solution, mix 1 teaspoon of salt in 1 cup of warm water. · Eat soft foods that are easy to chew. · Avoid foods that might sting, such as salty or spicy foods, citrus fruits, and tomatoes. · Do not smoke or use spit tobacco. Tobacco can slow healing in your mouth. If you need help quitting, talk to your doctor about stop-smoking programs and medicines. These can increase your chances of quitting for good. · If your tooth is loose, be gentle when you brush or floss. But be sure to brush your teeth at least two times a day, and floss at least once a day. When should you call for help? Call your doctor now or seek immediate medical care if:  ? · You have signs of infection, such as:  ¨ Increased pain, swelling, warmth, or redness. ¨ Red streaks leading from the area. ¨ Pus draining from the area. ¨ A fever. ? Watch closely for changes in your health, and be sure to contact your doctor if:  ? · You do not get better as expected. Where can you learn more? Go to http://asael-sloan.info/. Enter W162 in the search box to learn more about \"Broken Tooth: Care Instructions. \"  Current as of: May 12, 2017  Content Version: 11.4  © 6080-7506 [employer]. Care instructions adapted under license by [employer] (which disclaims liability or warranty for this information). If you have questions about a medical condition or this instruction, always ask your healthcare professional. [place] [address] any warranty or liability for your use of this information.

## 2017-11-21 NOTE — UC PROVIDER NOTE
Patient is a 27 y.o. female presenting with dental problem. The history is provided by the patient. Dental Pain    This is a new problem. The current episode started yesterday (chipped upper incisor ). The problem occurs constantly. The problem has been rapidly worsening. The pain is located in the front mouth. The quality of the pain is throbbing. The pain is at a severity of 10/10. The pain is severe. There was no vomiting, no fever, no swelling and no headaches. She has tried acetaminophen (motrin) for the symptoms. Past Medical History:   Diagnosis Date    Abnormal Pap smear     abnormal pap at 15 y/o normal since     [facility]     Diabetes mellitus     Gestational Diabetes with last two pregnancies-diet controlled        Past Surgical History:   Procedure Laterality Date    HX  SECTION      HX CHOLECYSTECTOMY      HX OTHER SURGICAL      cholecystectomy          Family History   Problem Relation Age of Onset    Diabetes Father     Hypertension Father     Stroke Father     Kidney Disease Father         Social History     Social History    Marital status: SINGLE     Spouse name: N/A    Number of children: N/A    Years of education: N/A     Occupational History    Not on file. Social History Main Topics    Smoking status: Never Smoker    Smokeless tobacco: Never Used    Alcohol use No    Drug use: No    Sexual activity: Yes     Birth control/ protection: None     Other Topics Concern    Not on file     Social History Narrative                ALLERGIES: Tramadol    Review of Systems   HENT: Positive for dental problem. All other systems reviewed and are negative. Vitals:    17 1734   BP: (!) 156/95   Pulse: 86   Resp: 16   Temp: 98.6 °F (37 °C)   SpO2: 97%   Weight: 97.7 kg (215 lb 6.4 oz)   Height: 5' 5\" (1.651 m)       Physical Exam   HENT:   Mouth/Throat: Abnormal dentition.  No dental abscesses or dental caries. Nursing note and vitals reviewed. MDM     Differential Diagnosis; Clinical Impression; Plan:     CLINICAL IMPRESSION:  Closed fracture of tooth, initial encounter  (primary encounter diagnosis)  Tooth pain      DDX    Plan:    Not sure if presenting sxs really cause of pain-  revived- c/w  narcotic dependency. Patient notifed- that cant give narcotic due to her history  Must see dentist ASAP    Motrin and amoxicillin given     Amount and/or Complexity of Data Reviewed:    Review and summarize past medical records:  Yes  Risk of Significant Complications, Morbidity, and/or Mortality:   Presenting problems: Moderate  Management options:   Moderate  Progress:   Patient progress:  Stable      Procedures

## 2017-11-22 ENCOUNTER — HOSPITAL ENCOUNTER (EMERGENCY)
Age: 32
Discharge: HOME | End: 2017-11-22
Attending: EMERGENCY MEDICINE
Payer: SELF-PAY

## 2017-11-22 VITALS
HEIGHT: 65 IN | OXYGEN SATURATION: 100 % | RESPIRATION RATE: 18 BRPM | BODY MASS INDEX: 35.78 KG/M2 | HEART RATE: 97 BPM | DIASTOLIC BLOOD PRESSURE: 89 MMHG | TEMPERATURE: 98.9 F | WEIGHT: 214.73 LBS | SYSTOLIC BLOOD PRESSURE: 147 MMHG

## 2017-11-22 DIAGNOSIS — G50.1 ATYPICAL FACE PAIN: Primary | ICD-10-CM

## 2017-11-22 DIAGNOSIS — Z87.898 HISTORY OF CHRONIC PAIN: ICD-10-CM

## 2017-11-22 DIAGNOSIS — K08.89 DENTALGIA: ICD-10-CM

## 2017-11-22 PROCEDURE — 74011000250 HC RX REV CODE- 250: Performed by: PHYSICIAN ASSISTANT

## 2017-11-22 PROCEDURE — 99283 EMERGENCY DEPT VISIT LOW MDM: CPT

## 2017-11-22 PROCEDURE — 74011250637 HC RX REV CODE- 250/637: Performed by: PHYSICIAN ASSISTANT

## 2017-11-22 RX ORDER — PENICILLIN V POTASSIUM 500 MG/1
500 TABLET, FILM COATED ORAL 3 TIMES DAILY
Qty: 30 TAB | Refills: 0 | Status: SHIPPED | OUTPATIENT
Start: 2017-11-22 | End: 2017-12-02

## 2017-11-22 RX ORDER — ACETAMINOPHEN AND CODEINE PHOSPHATE 300; 30 MG/1; MG/1
1 TABLET ORAL
Qty: 12 TAB | Refills: 0 | Status: SHIPPED | OUTPATIENT
Start: 2017-11-22 | End: 2018-04-26

## 2017-11-22 RX ADMIN — LIDOCAINE HYDROCHLORIDE: 20 SOLUTION ORAL; TOPICAL at 17:35

## 2017-11-22 NOTE — ED PROVIDER NOTES
[facility].  EMERGENCY DEPARTMENT HISTORY AND PHYSICAL EXAM         Date of Service: 2017   Patient Name: Jorge Rodgers   YOB: 1985  Medical Record Number: [id number]    History of Presenting Illness     Chief Complaint   Patient presents with    Dental Pain     Reporting had cracked her front tooth about a week ago. Has been having 9/10 constant pain. Had gone to urgent care and treat and advised, with little to no relief. History Provided By:  patient    Additional History:   Jorge Rodgers is a 27 y.o. female with PMhx significant for DM, back pain, who presents ambulatory to the ED with cc of progressively worsening dental pain x 7 days. Pt states she is experiencing \"lots of pain\" to her left incisor tooth, rating her pain 9/10 and described as a constant ache. Pt states \"it even hurts to open my mouth\". She notes she cracked it [place] couple weeks ago\" and then \"cracked the back side of it\" yesterday while having dinner. Pt notes she called her dentist but states she can get an appointment 7 days from now. Of note, pt reports being seen at Urgent Care recently and was given lidocaine with no relief. Pt specifically denies facial swelling, fever, chills, ear pain, nausea, vomiting. Social Hx: - Tobacco, - EtOH, - Illicit Drugs    There are no other complaints, changes or physical findings at this time.     Primary Care Provider: Mali Erazo NP   Specialist:    Past History     Past Medical History:   Past Medical History:   Diagnosis Date    Abnormal Pap smear     abnormal pap at 13 y/o normal since     Back pain 2015    [facility]  emergency [facility]     Diabetes mellitus     Gestational Diabetes with last two pregnancies-diet controlled        Past Surgical History:   Past Surgical History:   Procedure Laterality Date    HX  SECTION      HX CHOLECYSTECTOMY      HX OTHER SURGICAL      cholecystectomy  Family History:   Family History   Problem Relation Age of Onset    Diabetes Father     Hypertension Father     Stroke Father     Kidney Disease Father         Social History:   Social History   Substance Use Topics    Smoking status: Never Smoker    Smokeless tobacco: Never Used    Alcohol use No        Allergies: Allergies   Allergen Reactions    Tramadol Hives        Review of Systems   Review of Systems   Constitutional: Negative for chills and fever. HENT: Positive for dental problem (left incisor tooth). Negative for ear pain, facial swelling, rhinorrhea and sore throat. Respiratory: Negative for cough and shortness of breath. Cardiovascular: Negative for chest pain and palpitations. Gastrointestinal: Negative for abdominal pain, diarrhea, nausea and vomiting. Genitourinary: Negative for dysuria and hematuria. Musculoskeletal: Negative for neck pain and neck stiffness. Skin: Negative for rash and wound. Allergic/Immunologic: Negative for environmental allergies, food allergies and immunocompromised state. Neurological: Negative for dizziness and headaches. Psychiatric/Behavioral: Negative for agitation and confusion. Physical Exam  Physical Exam   Constitutional: She is oriented to person, place, and time. She appears well-developed and well-nourished. No distress. WDWN AA female, alert, in moderate discomfort   HENT:   Head: Normocephalic and atraumatic. Nose: Nose normal.   Mouth/Throat: Oropharynx is clear and moist. No oropharyngeal exudate. Pt with poor overall dental health, multiple dental caries, decayed/missing teeth. Tender to T9     which was notably decayed. No gingival erythema, edema, exudate, or bleeding noted. Eyes: Conjunctivae and EOM are normal. Right eye exhibits no discharge. Left eye exhibits no discharge. No scleral icterus. Neck: Normal range of motion. Neck supple. No JVD present. No tracheal deviation present.  No thyromegaly present. Cardiovascular: Normal rate, regular rhythm and normal heart sounds. Pulmonary/Chest: Effort normal and breath sounds normal. No respiratory distress. She has no wheezes. Musculoskeletal: Normal range of motion. She exhibits no edema. Lymphadenopathy:     She has no cervical adenopathy. Neurological: She is alert and oriented to person, place, and time. She exhibits normal muscle tone. Coordination normal.   Skin: Skin is warm and dry. No rash noted. She is not diaphoretic. No erythema. Psychiatric: She has a normal mood and affect. Her behavior is normal. Judgment normal.   Nursing note and vitals reviewed. Medical Decision Making   I am the first provider for this patient. I reviewed the vital signs, available nursing notes, past medical history, past surgical history, family history and social history. Old Medical Records: Old medical records. ED Course:  5:12 PM   Initial assessment performed. The patients presenting problems have been discussed, and they are in agreement with the care plan formulated and outlined with them. I have encouraged them to ask questions as they arise throughout their visit. Progress Notes:   5:22 PM  Pt has been re-evaluated. Updated and informed pt about plan of discharge. Pt expresses understanding. Diagnostic Study Results     Vital Signs-Reviewed the patient's vital signs. Patient Vitals for the past 12 hrs:   Temp Pulse Resp BP SpO2   11/22/17 1637 98.9 °F (37.2 °C) 97 18 147/89 100 %       Medications Given in the ED:  Medications   dental ball (lidocaine/Benadryl/Cetacaine) mixture (not administered)       Diagnosis:  Clinical Impression:   1. Atypical face pain    2.  Dentalgia         Plan:  1:   Follow-up Information     Follow up With Details Comments [address], [address]  [phone]      [facility] EMERGENCY DEPT  If symptoms worsen [address] [facility] [address]  [phone]          2:   Current Discharge Medication List      START taking these medications    Details   penicillin v potassium (VEETID) 500 mg tablet Take 1 Tab by mouth three (3) times daily for 10 days. Qty: 30 Tab, Refills: 0      acetaminophen-codeine (TYLENOL-CODEINE #3) 300-30 mg per tablet Take 1 Tab by mouth every six (6) hours as needed for Pain for up to 12 doses. Max Daily Amount: 4 Tabs. Qty: 12 Tab, Refills: 0           Return to ED if worse. Disposition:  DISCHARGE NOTE  5:22 PM  The patient has been re-evaluated and is ready for discharge. Reviewed available results with patient. Counseled pt on diagnosis and care plan. Pt has expressed understanding, and all questions have been answered. Pt agrees with plan and agrees to F/U as recommended, or return to the ED if their sxs worsen. Discharge instructions have been provided and explained to the pt, along with reasons to return to the ED. Written by Gian Malik ED Scribe, as dictated by Cullen Armstrong PA-C.   _______________________________   Attestations: This note is prepared by Glenn Garcia, acting as Scribe for BRIA [place]: The scribe's documentation has been prepared under my direction and personally reviewed by me in its entirety.  I confirm that the note above accurately reflects all work, treatment, procedures, and medical decision making performed by me.  _______________________________

## 2017-11-22 NOTE — ED NOTES
Assumed care of patient. Patient is alert and oriented, does not appear to be in distress. Patient ambulatory to ED with c/o dental pain to upper front tooth secondary to cracking it two weeks ago and further injurying it while eating leon two days ago. Dentist appointment for 11/28. Patient positioned for comfort with call bell within reach. Side rails up for safety. Provider to evaluate patient.

## 2017-11-22 NOTE — ED NOTES
Patient discharge instructions reviewed with patient by ANDREW Hernadez. Patient verbalized understanding. Patient declined wheelchair, ambulated off unit.

## 2017-11-22 NOTE — DISCHARGE INSTRUCTIONS
Tooth and Gum Pain: Care Instructions  Your Care Instructions    The most common causes of dental pain are tooth decay and gum disease. Pain can also be caused by an infection of the tooth (abscess) or the gums. Or you may have pain from a broken or cracked tooth. Other causes of pain include infection and damage to a tooth from nervous grinding of your teeth. A wisdom tooth can be painful when it is coming in but cannot break through the gum. It can also be painful when the tooth is only partway in and extra gum tissue has formed around it. The tissue can get inflamed (pericoronitis), and sometimes it gets infected. Prompt dental care can help find the cause of your toothache and keep the tooth from dying or gum disease from getting worse. Self-care at home may reduce your pain and discomfort. Follow-up care is a key part of your treatment and safety. Be sure to make and go to all appointments, and call your dentist or doctor if you are having problems. It's also a good idea to know your test results and keep a list of the medicines you take. How can you care for yourself at home? · To reduce pain and facial swelling, put an ice or cold pack on the outside of your cheek for 10 to 20 minutes at a time. Put a thin cloth between the ice and your skin. Do not use heat. · If your doctor prescribed antibiotics, take them as directed. Do not stop taking them just because you feel better. You need to take the full course of antibiotics. · Ask your doctor if you can take an over-the-counter pain medicine, such as acetaminophen (Tylenol), ibuprofen (Advil, Motrin), or naproxen (Aleve). Be safe with medicines. Read and follow all instructions on the label. · Avoid very hot, cold, or sweet foods and drinks if they increase your pain. · Rinse your mouth with warm salt water every 2 hours to help relieve pain and swelling. Mix 1 teaspoon of salt in 8 ounces of water.   · Talk to your dentist about using special toothpaste for sensitive teeth. To reduce pain on contact with heat or cold or when brushing, brush with this toothpaste regularly or rub a small amount of the paste on the sensitive area with a clean finger 2 or 3 times a day. Floss gently between your teeth. · Do not smoke or use spit tobacco. Tobacco use can make gum problems worse, decreases your ability to fight infection in your gums, and delays healing. If you need help quitting, talk to your doctor about stop-smoking programs and medicines. These can increase your chances of quitting for good. When should you call for help? Call 911 anytime you think you may need emergency care. For example, call if:  ? · You have trouble breathing. ?Call your dentist or doctor now or seek immediate medical care if:  ? · You have signs of infection, such as:  ¨ Increased pain, swelling, warmth, or redness. ¨ Red streaks leading from the area. ¨ Pus draining from the area. ¨ A fever. ? Watch closely for changes in your health, and be sure to contact your doctor if:  ? · You do not get better as expected. Where can you learn more? Go to http://asael-sloan.info/. Enter [id number] [id number] in the search box to learn more about \"Tooth and Gum Pain: Care Instructions. \"  Current as of: May 12, 2017  Content Version: 11.4  © 6599-6678 [employer]. Care instructions adapted under license by [employer] (which disclaims liability or warranty for this information). If you have questions about a medical condition or this instruction, always ask your healthcare professional. [place] [address] any warranty or liability for your use of this information.

## 2017-11-22 NOTE — LETTER
[facility] EMERGENCY DEPT 
[address] [facility] [address] [address] [phone] Work/School Note Date: 11/22/2017 To Whom It May concern: 
 
Ashley Yee was seen and treated today in the emergency room. She may return to work in 1 to 2 days, as symptoms improve. Devon Scruggs Sincerely, Brady Powell

## 2017-11-22 NOTE — PROGRESS NOTES
ED High Utilization Note    Pt is a 26 y/o [facility] [employer] female who presented to the ED with cc of dental pain. Pt has been seen in the ED 14 times in the past 6 months. CM met with pt re: high utilization of ED. CM introduced self, role. Pt verbalized understanding. Pt verified demographic information on chart. CM and pt discussed high utilization of ED, purpose of having a PCP for continued care. Pt verbalized understanding. Pt stated she last saw her PCP, NP Sandeep Whiteside, about 3 months ago. CM offered importance of regularly seeing PCP for continued care. Pt verbalized understanding and agreement. CM encouraged pt to follow up with PCP after today's ED visit. Pt stated she has an upcoming appointment on Tuesday (11/28) to see a dentist. Pt verified that she is a self-pay patient. She stated she has applied for the Care Card and is waiting for correspondence of acceptance. Pt reported no further questions or needs at this time. CM to continue to remain available for education, discharge planning as needed.      ANTHONY Lackey  [address] [facility]  [phone]

## 2018-04-08 ENCOUNTER — HOSPITAL ENCOUNTER (EMERGENCY)
Age: 33
Discharge: HOME | End: 2018-04-08
Attending: EMERGENCY MEDICINE | Admitting: EMERGENCY MEDICINE
Payer: SELF-PAY

## 2018-04-08 VITALS
DIASTOLIC BLOOD PRESSURE: 91 MMHG | TEMPERATURE: 98.7 F | SYSTOLIC BLOOD PRESSURE: 152 MMHG | HEART RATE: 93 BPM | OXYGEN SATURATION: 100 % | WEIGHT: 210.1 LBS | BODY MASS INDEX: 34.96 KG/M2 | RESPIRATION RATE: 18 BRPM

## 2018-04-08 DIAGNOSIS — K03.81 CRACKED TOOTH: ICD-10-CM

## 2018-04-08 DIAGNOSIS — G50.1 ATYPICAL FACIAL PAIN: Primary | ICD-10-CM

## 2018-04-08 DIAGNOSIS — K08.89 DENTALGIA: ICD-10-CM

## 2018-04-08 PROCEDURE — 74011250637 HC RX REV CODE- 250/637: Performed by: PHYSICIAN ASSISTANT

## 2018-04-08 PROCEDURE — 99283 EMERGENCY DEPT VISIT LOW MDM: CPT

## 2018-04-08 PROCEDURE — 74011000250 HC RX REV CODE- 250: Performed by: PHYSICIAN ASSISTANT

## 2018-04-08 RX ORDER — LIDOCAINE HYDROCHLORIDE 20 MG/ML
JELLY TOPICAL
Qty: 1 TUBE | Refills: 0 | Status: SHIPPED | OUTPATIENT
Start: 2018-04-08 | End: 2018-04-26

## 2018-04-08 RX ORDER — HYDROCODONE BITARTRATE AND ACETAMINOPHEN 5; 325 MG/1; MG/1
1 TABLET ORAL
Qty: 10 TAB | Refills: 0 | Status: SHIPPED | OUTPATIENT
Start: 2018-04-08 | End: 2018-04-26

## 2018-04-08 RX ORDER — PENICILLIN V POTASSIUM 500 MG/1
500 TABLET, FILM COATED ORAL 4 TIMES DAILY
Qty: 28 TAB | Refills: 0 | Status: SHIPPED | OUTPATIENT
Start: 2018-04-08 | End: 2018-04-15

## 2018-04-08 RX ADMIN — BENZOCAINE, BUTAMBEN, AND TETRACAINE HYDROCHLORIDE: .028; .004; .004 AEROSOL, SPRAY TOPICAL at 15:33

## 2018-04-08 NOTE — ED PROVIDER NOTES
EMERGENCY DEPARTMENT HISTORY AND PHYSICAL EXAM      Date: 4/8/2018  Patient Name: Zara Hancock    History of Presenting Illness     Chief Complaint   Patient presents with    Dental Pain     left lower tooth pain. pt was given pain meds and abx. pt didnt take full course of abx because it made her sick. Pt has a dentist appt on wednesday with [facility].  pt stated pain is back today       History Provided By: Patient    HPI: Zara Hancock, 27 y.o. female with PMHx significant for dental fracture, presents ambulatory to the ED with cc of left lower dental pain x 1.5 weeks. Pt notes that she has been taking OTC pain medications and applying Orajel to the affected tooth with no relief of her symptoms. Pt explains that she began having pain her left lower jaw approximately 1.5 weeks ago for which she was evaluated at the [facility] ER and prescribed Bactrim and Norco for the management of her pain. Pt reports that she took approximately 3 doses of the antibiotics, but she stopped taking the medication because it made her severely nauseous. Pt notes that she finished the course of the Norco, but her pain has since returned. Pt states that she has been unable to follow up with a dentist due to her lack of insurance, but she is scheduled to have the tooth extracted by [facility] in the near future. Pt states that she has no known allergies to medications, and she is driving herself home. Pt specifically denies fever. PCP: William Dominique, [place]    There are no other complaints, changes, or physical findings at this time. Current Outpatient Prescriptions   Medication Sig Dispense Refill    penicillin v potassium (VEETID) 500 mg tablet Take 1 Tab by mouth four (4) times daily for 7 days. 28 Tab 0    HYDROcodone-acetaminophen (NORCO) 5-325 mg per tablet Take 1 Tab by mouth every four (4) hours as needed for Pain. Max Daily Amount: 6 Tabs.  10 Tab 0    lidocaine (XYLOCAINE) 2 % jelly Apply 4 times daily as needed to gum line 1 Tube 0    acetaminophen-codeine (TYLENOL-CODEINE #3) 300-30 mg per tablet Take 1 Tab by mouth every six (6) hours as needed for Pain for up to 12 doses. Max Daily Amount: 4 Tabs. 12 Tab 0    gabapentin (NEURONTIN) 300 mg capsule Take 1 Cap by mouth three (3) times daily. 90 Cap 2    metFORMIN (GLUCOPHAGE) 1,000 mg tablet Take 1,000 mg by mouth two (2) times daily (with meals). Past History     Past Medical History:  Past Medical History:   Diagnosis Date    Abnormal Pap smear     abnormal pap at 15 y/o normal since     [facility] 2015    [facility]     Diabetes mellitus     Gestational Diabetes with last two pregnancies-diet controlled       Past Surgical History:  Past Surgical History:   Procedure Laterality Date    HX  SECTION      HX CHOLECYSTECTOMY      HX OTHER SURGICAL      cholecystectomy        Family History:  Family History   Problem Relation Age of Onset    Diabetes Father     Hypertension Father     Stroke Father     Kidney Disease Father        Social History:  Social History   Substance Use Topics    Smoking status: Never Smoker    Smokeless tobacco: Never Used    Alcohol use No       Allergies: Allergies   Allergen Reactions    Tramadol Hives         Review of Systems   Review of Systems   Constitutional: Negative for chills and fever. HENT: Positive for dental problem. Negative for sore throat. Eyes: Negative for pain. Respiratory: Negative for cough and shortness of breath. Cardiovascular: Negative for chest pain. Gastrointestinal: Negative for abdominal pain, diarrhea, nausea and vomiting. Genitourinary: Negative for dysuria and hematuria. Musculoskeletal: Negative for arthralgias and myalgias. Skin: Negative for rash. Neurological: Negative for dizziness, light-headedness, numbness and headaches. Psychiatric/Behavioral: Negative for behavioral problems and confusion.        Physical Exam   Physical Exam   Constitutional: She is oriented to person, place, and time. She appears well-developed and well-nourished. No distress. HENT:   Head: Normocephalic and atraumatic. Right Ear: External ear normal.   Left Ear: External ear normal.   Nose: Nose normal.   Generally poor dental health overall with multiple dental caries. No facial erythema, edema, bleeding, or fluctuance. tooth # 22 with dental caries and some gingival tenderness, no surrounding erythema, edema, or fluctuance. No trismus. Eyes: Conjunctivae and EOM are normal.   Neck: Normal range of motion. Neck supple. Cardiovascular: Normal rate, regular rhythm and normal heart sounds. No murmur heard. Pulmonary/Chest: Effort normal and breath sounds normal. She has no decreased breath sounds. She has no wheezes. Abdominal: Soft. Bowel sounds are normal. She exhibits no distension. There is no tenderness. There is no guarding. Musculoskeletal: Normal range of motion. She exhibits no edema or tenderness. Neurological: She is alert and oriented to person, place, and time. Skin: Skin is warm and dry. No rash noted. She is not diaphoretic. Psychiatric: She has a normal mood and affect. Her behavior is normal. Judgment normal.   Nursing note and vitals reviewed. Medical Decision Making   I am the first provider for this patient. I reviewed the vital signs, available nursing notes, past medical history, past surgical history, family history and social history. Vital Signs-Reviewed the patient's vital signs. Patient Vitals for the past 12 hrs:   Temp Pulse Resp BP SpO2   04/08/18 1430 98.7 °F (37.1 °C) 93 18 (!) 152/91 100 %       Pulse Oximetry Analysis - 100% on RA    Records Reviewed: Nursing Notes and Old Medical Records    Provider Notes (Medical Decision Making):   DDX: gingivitis, abscess, poor oral hygiene, fractured tooth, dentalgia    Patient presents with dental pain. No obvious abscess that needs drainage.  No red flags that make PTA, RPA, ludwigs angina concerning. Will tx with dental balls, antibiotics and outpatient analgesics. Given information on dentists and importance of followup and no smoking. ED Course:   Initial assessment performed. The patients presenting problems have been discussed, and they are in agreement with the care plan formulated and outlined with them. I have encouraged them to ask questions as they arise throughout their visit. Critical Care Time:   0    Disposition:  4:08 PM  The care plan has been outline with the patient and/or family, who verbally conveyed understanding and agreement. Available results have been reviewed. Patient and/or family understand the follow up plan as outlined and discharge instructions. Should their condition deterioration at any time after discharge the patient agrees to return, follow up sooner than outlined or seek medical assistance at the closest Emergency Room as soon as possible. Questions have been answered. Discharge instructions and educational information regarding the patient's diagnosis as well a list of reasons why the patient would want to seek immediate medical attention, should their condition change, were reviewed directly with the patient/family     PLAN:  1. Discharge home  2. Medications as directed  3. Schedule f/u with Dentist  4. Return precautions reviewed    Discharge Medication List as of 4/8/2018  4:08 PM      START taking these medications    Details   penicillin v potassium (VEETID) 500 mg tablet Take 1 Tab by mouth four (4) times daily for 7 days. , Normal, Disp-28 Tab, R-0      HYDROcodone-acetaminophen (NORCO) 5-325 mg per tablet Take 1 Tab by mouth every four (4) hours as needed for Pain.  Max Daily Amount: 6 Tabs., Print, Disp-10 Tab, R-0      lidocaine (XYLOCAINE) 2 % jelly Apply 4 times daily as needed to gum line, Normal, Disp-1 Tube, R-0         CONTINUE these medications which have NOT CHANGED    Details acetaminophen-codeine (TYLENOL-CODEINE #3) 300-30 mg per tablet Take 1 Tab by mouth every six (6) hours as needed for Pain for up to 12 doses. Max Daily Amount: 4 Tabs., Print, Disp-12 Tab, R-0      gabapentin (NEURONTIN) 300 mg capsule Take 1 Cap by mouth three (3) times daily. , Normal, Disp-90 Cap, R-2      metFORMIN (GLUCOPHAGE) 1,000 mg tablet Take 1,000 mg by mouth two (2) times daily (with meals). , Historical Med           2. Follow-up Information     Follow up With Details Comments Contact Info    [facility] Go to  [address] [place]  [phone]    [facility] EMERGENCY DEPT  As needed, If symptoms worsen [address] [place] [address]  [phone]        Return to ED if worse     Diagnosis     Clinical Impression:   1. Atypical facial pain    2. Dentalgia    3. Cracked tooth        Attestations: This note is prepared by Titi Kaufman, acting as Scribe for Delio Valdez PA-C. Delio Valdez PA-C: The scribe's documentation has been prepared under my direction and personally reviewed by me in its entirety. I confirm that the note above accurately reflects all work, treatment, procedures, and medical decision making performed by me.           This note will not be viewable in [facility]

## 2018-04-08 NOTE — DISCHARGE INSTRUCTIONS
Thank you!     Thank you for allowing us to provide you with excellent care today. We hope we addressed all of your concerns and needs. We strive to provide excellent quality care in the Emergency Department. You will receive a survey after your visit to evaluate the care you were provided.      Please rate us a level 5 (excellent), as anything less than excellent does not meet our goals.      If you feel that you have not received excellent quality care or timely care, please ask to speak to the nurse manager. Please choose us in the future for your continued health care needs. ______________________________________________________________________    The exam and treatment you received in the Emergency Department were for an urgent problem and are not intended as complete care. It is important that you follow-up with a doctor, nurse practitioner, or physician assistant to:  (1) confirm your diagnosis,  (2) re-evaluation of changes in your illness and treatment, and  (3) for ongoing care. If your symptoms become worse or you do not improve as expected and you are unable to reach your usual health care provider, you should return to the Emergency Department. We are available 24 hours a day. Take this sheet with you when you go to your follow-up visit. If you have any problem arranging the follow-up visit, contact [address] [place] [address] [phone])    Make an appointment with your Primary Care doctor for follow up of this visit. Return to the ER if you are unable to be seen in the time recommended on your discharge instructions. Tooth and Gum Pain: Care Instructions  Your Care Instructions    The most common causes of dental pain are tooth decay and gum disease. Pain can also be caused by an infection of the tooth (abscess) or the gums. Or you may have pain from a broken or cracked tooth. Other causes of pain include infection and damage to a tooth from nervous grinding of your teeth.   A wisdom tooth can be painful when it is coming in but cannot break through the gum. It can also be painful when the tooth is only partway in and extra gum tissue has formed around it. The tissue can get inflamed (pericoronitis), and sometimes it gets infected. Prompt dental care can help find the cause of your toothache and keep the tooth from dying or gum disease from getting worse. Self-care at home may reduce your pain and discomfort. Follow-up care is a key part of your treatment and safety. Be sure to make and go to all appointments, and call your dentist or doctor if you are having problems. It's also a good idea to know your test results and keep a list of the medicines you take. How can you care for yourself at home? · To reduce pain and facial swelling, put an ice or cold pack on the outside of your cheek for 10 to 20 minutes at a time. Put a thin cloth between the ice and your skin. Do not use heat. · If your doctor prescribed antibiotics, take them as directed. Do not stop taking them just because you feel better. You need to take the full course of antibiotics. · Ask your doctor if you can take an over-the-counter pain medicine, such as acetaminophen (Tylenol), ibuprofen (Advil, Motrin), or naproxen (Aleve). Be safe with medicines. Read and follow all instructions on the label. · Avoid very hot, cold, or sweet foods and drinks if they increase your pain. · Rinse your mouth with warm salt water every 2 hours to help relieve pain and swelling. Mix 1 teaspoon of salt in 8 ounces of water. · Talk to your dentist about using special toothpaste for sensitive teeth. To reduce pain on contact with heat or cold or when brushing, brush with this toothpaste regularly or rub a small amount of the paste on the sensitive area with a clean finger 2 or 3 times a day. Floss gently between your teeth.   · Do not smoke or use spit tobacco. Tobacco use can make gum problems worse, decreases your ability to fight infection in your gums, and delays healing. If you need help quitting, talk to your doctor about stop-smoking programs and medicines. These can increase your chances of quitting for good. When should you call for help? Call 911 anytime you think you may need emergency care. For example, call if:  ? · You have trouble breathing. ?Call your dentist or doctor now or seek immediate medical care if:  ? · You have signs of infection, such as:  ¨ Increased pain, swelling, warmth, or redness. ¨ Red streaks leading from the area. ¨ Pus draining from the area. ¨ A fever. ? Watch closely for changes in your health, and be sure to contact your doctor if:  ? · You do not get better as expected. Where can you learn more? Go to http://asael-sloan.info/. Enter [id number] [id number] in the search box to learn more about \"Tooth and Gum Pain: Care Instructions. \"  Current as of: May 12, 2017  Content Version: 11.4  © 3363-0869 [employer]. Care instructions adapted under license by [employer] (which disclaims liability or warranty for this information). If you have questions about a medical condition or this instruction, always ask your healthcare professional. [place] [address] any warranty or liability for your use of this information.

## 2018-04-08 NOTE — LETTER
[address] 
[facility] EMERGENCY DEPT 
[address] [place] [address] [address] [id number] [phone] Work/School Note Date: 4/8/2018 To Whom It May concern: 
 
Sammie Veras was seen and treated today in the emergency room by the following provider(s): 
Attending Provider: Noah Bowman DO Physician Assistant: Delio Valdez PA-C. Sammie Veras may return to work on 11 April 2018. Sincerely, Delio Valdez PA-C

## 2018-04-08 NOTE — LETTER
[address] 
[facility] EMERGENCY DEPT 
[address] [place] [address] [facility] [address] [id number] 
[phone] Work/School Note Date: 4/8/2018 To Whom It May concern: 
 
Jimi Ojeda was seen and treated today in the emergency room by the following provider(s): 
Attending Provider: Tommy Paul DO Physician Assistant: Evangelista Bear PA-C. Jimi Ojeda may return to work on 10 April 2018. Sincerely, Evangelista Bear PA-C

## 2018-04-25 PROCEDURE — 96365 THER/PROPH/DIAG IV INF INIT: CPT

## 2018-04-25 PROCEDURE — 96375 TX/PRO/DX INJ NEW DRUG ADDON: CPT

## 2018-04-25 PROCEDURE — 99285 EMERGENCY DEPT VISIT HI MDM: CPT

## 2018-04-25 PROCEDURE — 96361 HYDRATE IV INFUSION ADD-ON: CPT

## 2018-04-26 ENCOUNTER — HOSPITAL ENCOUNTER (EMERGENCY)
Age: 33
Discharge: HOME | End: 2018-04-26
Attending: EMERGENCY MEDICINE
Payer: SELF-PAY

## 2018-04-26 ENCOUNTER — APPOINTMENT (OUTPATIENT)
Dept: CT IMAGING | Age: 33
End: 2018-04-26
Attending: EMERGENCY MEDICINE
Payer: SELF-PAY

## 2018-04-26 VITALS
HEIGHT: 65 IN | DIASTOLIC BLOOD PRESSURE: 83 MMHG | BODY MASS INDEX: 34.45 KG/M2 | HEART RATE: 87 BPM | OXYGEN SATURATION: 100 % | WEIGHT: 206.79 LBS | SYSTOLIC BLOOD PRESSURE: 122 MMHG | RESPIRATION RATE: 18 BRPM | TEMPERATURE: 97.8 F

## 2018-04-26 DIAGNOSIS — R31.9 URINARY TRACT INFECTION WITH HEMATURIA, SITE UNSPECIFIED: Primary | ICD-10-CM

## 2018-04-26 DIAGNOSIS — N39.0 URINARY TRACT INFECTION WITH HEMATURIA, SITE UNSPECIFIED: Primary | ICD-10-CM

## 2018-04-26 DIAGNOSIS — E86.0 DEHYDRATION: ICD-10-CM

## 2018-04-26 DIAGNOSIS — R10.2 SUPRAPUBIC PAIN: ICD-10-CM

## 2018-04-26 DIAGNOSIS — E87.6 HYPOKALEMIA: ICD-10-CM

## 2018-04-26 DIAGNOSIS — N30.90 CYSTITIS: ICD-10-CM

## 2018-04-26 LAB
ALBUMIN SERPL-MCNC: 3.7 G/DL (ref 3.5–5)
ALBUMIN/GLOB SERPL: 0.9 {RATIO} (ref 1.1–2.2)
ALP SERPL-CCNC: 88 U/L (ref 45–117)
ALT SERPL-CCNC: 15 U/L (ref 12–78)
ANION GAP SERPL CALC-SCNC: 9 MMOL/L (ref 5–15)
APPEARANCE UR: CLEAR
AST SERPL-CCNC: 5 U/L (ref 15–37)
BACTERIA URNS QL MICRO: NEGATIVE /HPF
BASOPHILS # BLD: 0 K/UL (ref 0–0.1)
BASOPHILS NFR BLD: 0 % (ref 0–1)
BILIRUB SERPL-MCNC: 0.6 MG/DL (ref 0.2–1)
BILIRUB UR QL: NEGATIVE
BUN SERPL-MCNC: 6 MG/DL (ref 6–20)
BUN/CREAT SERPL: 7 (ref 12–20)
CALCIUM SERPL-MCNC: 9.3 MG/DL (ref 8.5–10.1)
CHLORIDE SERPL-SCNC: 100 MMOL/L (ref 97–108)
CO2 SERPL-SCNC: 28 MMOL/L (ref 21–32)
COLOR UR: ABNORMAL
CREAT SERPL-MCNC: 0.89 MG/DL (ref 0.55–1.02)
DIFFERENTIAL METHOD BLD: NORMAL
EOSINOPHIL # BLD: 0 K/UL (ref 0–0.4)
EOSINOPHIL NFR BLD: 0 % (ref 0–7)
EPITH CASTS URNS QL MICRO: ABNORMAL /LPF
ERYTHROCYTE [DISTWIDTH] IN BLOOD BY AUTOMATED COUNT: 11.7 % (ref 11.5–14.5)
GLOBULIN SER CALC-MCNC: 4.2 G/DL (ref 2–4)
GLUCOSE BLD STRIP.AUTO-MCNC: 188 MG/DL (ref 65–100)
GLUCOSE BLD STRIP.AUTO-MCNC: 374 MG/DL (ref 65–100)
GLUCOSE SERPL-MCNC: 398 MG/DL (ref 65–100)
GLUCOSE UR STRIP.AUTO-MCNC: >1000 MG/DL
HCG UR QL: NEGATIVE
HCT VFR BLD AUTO: 39.8 % (ref 35–47)
HGB BLD-MCNC: 13.3 G/DL (ref 11.5–16)
HGB UR QL STRIP: ABNORMAL
HYALINE CASTS URNS QL MICRO: ABNORMAL /LPF (ref 0–5)
IMM GRANULOCYTES # BLD: 0 K/UL (ref 0–0.04)
IMM GRANULOCYTES NFR BLD AUTO: 0 % (ref 0–0.5)
KETONES UR QL STRIP.AUTO: ABNORMAL MG/DL
LEUKOCYTE ESTERASE UR QL STRIP.AUTO: ABNORMAL
LYMPHOCYTES # BLD: 2.7 K/UL (ref 0.8–3.5)
LYMPHOCYTES NFR BLD: 25 % (ref 12–49)
MCH RBC QN AUTO: 27.9 PG (ref 26–34)
MCHC RBC AUTO-ENTMCNC: 33.4 G/DL (ref 30–36.5)
MCV RBC AUTO: 83.4 FL (ref 80–99)
MONOCYTES # BLD: 0.6 K/UL (ref 0–1)
MONOCYTES NFR BLD: 5 % (ref 5–13)
NEUTS SEG # BLD: 7.3 K/UL (ref 1.8–8)
NEUTS SEG NFR BLD: 68 % (ref 32–75)
NITRITE UR QL STRIP.AUTO: POSITIVE
NRBC # BLD: 0 K/UL (ref 0–0.01)
NRBC BLD-RTO: 0 PER 100 WBC
PH UR STRIP: 5 [PH] (ref 5–8)
PLATELET # BLD AUTO: 276 K/UL (ref 150–400)
PMV BLD AUTO: 10.1 FL (ref 8.9–12.9)
POTASSIUM SERPL-SCNC: 3.3 MMOL/L (ref 3.5–5.1)
PROT SERPL-MCNC: 7.9 G/DL (ref 6.4–8.2)
PROT UR STRIP-MCNC: 30 MG/DL
RBC # BLD AUTO: 4.77 M/UL (ref 3.8–5.2)
RBC #/AREA URNS HPF: ABNORMAL /HPF (ref 0–5)
SERVICE CMNT-IMP: ABNORMAL
SERVICE CMNT-IMP: ABNORMAL
SODIUM SERPL-SCNC: 137 MMOL/L (ref 136–145)
SP GR UR REFRACTOMETRY: >1.03 (ref 1–1.03)
UA: UC IF INDICATED,UAUC: ABNORMAL
UROBILINOGEN UR QL STRIP.AUTO: 1 EU/DL (ref 0.2–1)
WBC # BLD AUTO: 10.6 K/UL (ref 3.6–11)
WBC URNS QL MICRO: ABNORMAL /HPF (ref 0–4)

## 2018-04-26 PROCEDURE — 74176 CT ABD & PELVIS W/O CONTRAST: CPT

## 2018-04-26 PROCEDURE — 82962 GLUCOSE BLOOD TEST: CPT

## 2018-04-26 PROCEDURE — 81001 URINALYSIS AUTO W/SCOPE: CPT | Performed by: EMERGENCY MEDICINE

## 2018-04-26 PROCEDURE — 85025 COMPLETE CBC W/AUTO DIFF WBC: CPT | Performed by: EMERGENCY MEDICINE

## 2018-04-26 PROCEDURE — 87077 CULTURE AEROBIC IDENTIFY: CPT | Performed by: EMERGENCY MEDICINE

## 2018-04-26 PROCEDURE — 87186 SC STD MICRODIL/AGAR DIL: CPT | Performed by: EMERGENCY MEDICINE

## 2018-04-26 PROCEDURE — 80053 COMPREHEN METABOLIC PANEL: CPT | Performed by: EMERGENCY MEDICINE

## 2018-04-26 PROCEDURE — 74011250637 HC RX REV CODE- 250/637: Performed by: EMERGENCY MEDICINE

## 2018-04-26 PROCEDURE — 74011000258 HC RX REV CODE- 258: Performed by: EMERGENCY MEDICINE

## 2018-04-26 PROCEDURE — 74011636637 HC RX REV CODE- 636/637: Performed by: EMERGENCY MEDICINE

## 2018-04-26 PROCEDURE — 74011250636 HC RX REV CODE- 250/636: Performed by: EMERGENCY MEDICINE

## 2018-04-26 PROCEDURE — 81025 URINE PREGNANCY TEST: CPT | Performed by: EMERGENCY MEDICINE

## 2018-04-26 PROCEDURE — 36415 COLL VENOUS BLD VENIPUNCTURE: CPT | Performed by: EMERGENCY MEDICINE

## 2018-04-26 PROCEDURE — 87086 URINE CULTURE/COLONY COUNT: CPT | Performed by: EMERGENCY MEDICINE

## 2018-04-26 RX ORDER — OXYCODONE AND ACETAMINOPHEN 5; 325 MG/1; MG/1
2 TABLET ORAL
Status: COMPLETED | OUTPATIENT
Start: 2018-04-26 | End: 2018-04-26

## 2018-04-26 RX ORDER — KETOROLAC TROMETHAMINE 30 MG/ML
15 INJECTION, SOLUTION INTRAMUSCULAR; INTRAVENOUS
Status: COMPLETED | OUTPATIENT
Start: 2018-04-26 | End: 2018-04-26

## 2018-04-26 RX ORDER — CEPHALEXIN 500 MG/1
500 CAPSULE ORAL 3 TIMES DAILY
Qty: 21 CAP | Refills: 0 | Status: SHIPPED | OUTPATIENT
Start: 2018-04-26 | End: 2018-05-03

## 2018-04-26 RX ORDER — POTASSIUM CHLORIDE 750 MG/1
40 TABLET, FILM COATED, EXTENDED RELEASE ORAL
Status: DISCONTINUED | OUTPATIENT
Start: 2018-04-26 | End: 2018-04-26 | Stop reason: HOSPADM

## 2018-04-26 RX ORDER — OXYCODONE AND ACETAMINOPHEN 5; 325 MG/1; MG/1
1 TABLET ORAL
Qty: 12 TAB | Refills: 0 | Status: SHIPPED | OUTPATIENT
Start: 2018-04-26 | End: 2018-05-15

## 2018-04-26 RX ORDER — IBUPROFEN 600 MG/1
600 TABLET ORAL
Qty: 30 TAB | Refills: 0 | Status: SHIPPED | OUTPATIENT
Start: 2018-04-26 | End: 2018-05-15

## 2018-04-26 RX ADMIN — KETOROLAC TROMETHAMINE 15 MG: 30 INJECTION, SOLUTION INTRAMUSCULAR at 02:47

## 2018-04-26 RX ADMIN — INSULIN HUMAN 10 UNITS: 100 INJECTION, SOLUTION PARENTERAL at 02:59

## 2018-04-26 RX ADMIN — CEFTRIAXONE 1 G: 1 INJECTION, POWDER, FOR SOLUTION INTRAMUSCULAR; INTRAVENOUS at 02:45

## 2018-04-26 RX ADMIN — OXYCODONE HYDROCHLORIDE AND ACETAMINOPHEN 2 TABLET: 5; 325 TABLET ORAL at 04:03

## 2018-04-26 RX ADMIN — SODIUM CHLORIDE 1000 ML: 900 INJECTION, SOLUTION INTRAVENOUS at 02:45

## 2018-04-26 NOTE — ED NOTES
Bedside and Verbal shift change report given to PARISH Hendrickson (oncoming nurse) by Marly Parkinson (offgoing nurse). Report included the following information SBAR, ED Summary, Intake/Output, MAR and Recent Results. Monitor x 2. Call bell in reach. Bed in lowest position, with side rails up x 2. Pt updated on plan of care.

## 2018-04-26 NOTE — ED NOTES
C/o left sided & lower abd pain & nausea beginning Monday w/ urinary symptoms (urgency, dysuria, hesitancy, frequency) & left flank pain tender to touch. Took Azo at home w/o relief, increased PO fluid at home. Denies h/o kidney stones. Reports \"I was spotting too, but my period is due soon. I'm on the depo. \" Denies vomiting or diarrhea. \"I've gotten uti's before but never this badly. \" Denies abnormal vaginal bleeding or discharge.

## 2018-04-26 NOTE — ED PROVIDER NOTES
EMERGENCY DEPARTMENT HISTORY AND PHYSICAL EXAM      Date: 4/26/2018  Patient Name: Jae Naidu    History of Presenting Illness     Chief Complaint   Patient presents with    Flank Pain     ambulatory to triage, left, started Monday, denies N/V/D       History Provided By: Patient    HPI: Jae Naidu, 27 y.o. female with PMHx significant for DM, presents ambulatory to the ED with cc of gradual onset of progressively worsening pressure-like suprapubic abdominal pain x 3 days. Pt complains of associated gradual onset of L flank pain, mild dysuria, and intermittent nausea. She reports a history of UTI's and notes that her current symptoms are similar, expresses concern for the same. Pt denies any history of kidney stones or kidney infection. She denies any known allergies to antibiotics. Pt specifically denies vomiting, fever, or chills. Chief Complaint: abdominal pain  Duration: 3 Days  Timing:  Gradual, Progressive and Worsening  Location: suprapubic  Quality: Pressure  Severity: Moderate  Modifying Factors: denies any  Associated Symptoms: L flank pain, dysuria, and nausea    There are no other complaints, changes, or physical findings at this time. PCP: Margret Rangel NP    Current Facility-Administered Medications   Medication Dose Route Frequency Provider Last Rate Last Dose    potassium chloride SR (KLOR-CON 10) tablet 40 mEq  40 mEq Oral NOW Christy Elder MD         Current Outpatient Prescriptions   Medication Sig Dispense Refill    oxyCODONE-acetaminophen (PERCOCET) 5-325 mg per tablet Take 1 Tab by mouth every four (4) hours as needed for Pain. Max Daily Amount: 6 Tabs. 12 Tab 0    cephALEXin (KEFLEX) 500 mg capsule Take 1 Cap by mouth three (3) times daily for 7 days. 21 Cap 0    ibuprofen (MOTRIN) 600 mg tablet Take 1 Tab by mouth every eight (8) hours as needed for Pain. 30 Tab 0    metFORMIN (GLUCOPHAGE) 1,000 mg tablet Take 1,000 mg by mouth two (2) times daily (with meals). Past History     Past Medical History:  Past Medical History:   Diagnosis Date    Abnormal Pap smear     abnormal pap at 15 y/o normal since     [facility] 2015    [facility]     Diabetes mellitus     Gestational Diabetes with last two pregnancies-diet controlled       Past Surgical History:  Past Surgical History:   Procedure Laterality Date    HX  SECTION      HX CHOLECYSTECTOMY      HX OTHER SURGICAL      cholecystectomy        Family History:  Family History   Problem Relation Age of Onset    Diabetes Father     Hypertension Father     Stroke Father     Kidney Disease Father        Social History:  Social History   Substance Use Topics    Smoking status: Never Smoker    Smokeless tobacco: Never Used    Alcohol use No       Allergies: Allergies   Allergen Reactions    Tramadol Hives         Review of Systems   Review of Systems   Constitutional: Negative for activity change, appetite change, fatigue and fever. HENT: Negative. Negative for congestion, rhinorrhea and sore throat. Respiratory: Negative. Negative for cough, shortness of breath and wheezing. Cardiovascular: Negative. Negative for chest pain and leg swelling. Gastrointestinal: Positive for abdominal pain (suprapubic) and nausea. Negative for abdominal distention, constipation, diarrhea and vomiting. Endocrine: Negative. Genitourinary: Positive for dysuria and flank pain (L). Negative for difficulty urinating, menstrual problem, vaginal bleeding and vaginal discharge. Musculoskeletal: Negative for arthralgias, joint swelling and myalgias. Skin: Negative. Negative for rash. Neurological: Negative. Negative for dizziness, weakness, light-headedness and headaches. Psychiatric/Behavioral: Negative. Physical Exam   Physical Exam   Constitutional: She is oriented to person, place, and time. She appears well-developed and well-nourished. Tired-appearing  Non-toxic  Stable vitals and afebrile   HENT:   Head: Atraumatic. Eyes: EOM are normal.   Cardiovascular: Normal rate, regular rhythm, normal heart sounds and intact distal pulses. Exam reveals no gallop and no friction rub. No murmur heard. Pulmonary/Chest: Effort normal and breath sounds normal. No respiratory distress. She has no wheezes. She has no rales. She exhibits no tenderness. Abdominal: Soft. Bowel sounds are normal. She exhibits no distension and no mass. There is no rebound and no guarding. Suprapubic tenderness  No CVA tenderness   Musculoskeletal: Normal range of motion. She exhibits no edema or tenderness. Neurological: She is oriented to person, place, and time. Skin: Skin is warm. Psychiatric: She has a normal mood and affect. Nursing note and vitals reviewed.         Diagnostic Study Results     Labs -     Recent Results (from the past 12 hour(s))   URINALYSIS W/ REFLEX CULTURE    Collection Time: 04/26/18 12:05 AM   Result Value Ref Range    Color ORANGE      Appearance CLEAR CLEAR      Specific gravity >1.030 (H) 1.003 - 1.030    pH (UA) 5.0 5.0 - 8.0      Protein 30 (A) NEG mg/dL    Glucose >1000 (A) NEG mg/dL    Ketone TRACE (A) NEG mg/dL    Bilirubin NEGATIVE  NEG      Blood MODERATE (A) NEG      Urobilinogen 1.0 0.2 - 1.0 EU/dL    Nitrites POSITIVE (A) NEG      Leukocyte Esterase SMALL (A) NEG      UA:UC IF INDICATED URINE CULTURE ORDERED (A) CNI      WBC  0 - 4 /hpf    RBC 20-50 0 - 5 /hpf    Epithelial cells FEW FEW /lpf    Bacteria NEGATIVE  NEG /hpf    Hyaline cast 0-2 0 - 5 /lpf   HCG URINE, QL    Collection Time: 04/26/18 12:05 AM   Result Value Ref Range    HCG urine, QL NEGATIVE  NEG     CBC WITH AUTOMATED DIFF    Collection Time: 04/26/18  1:25 AM   Result Value Ref Range    WBC 10.6 3.6 - 11.0 K/uL    RBC 4.77 3.80 - 5.20 M/uL    HGB 13.3 11.5 - 16.0 g/dL    HCT 39.8 35.0 - 47.0 %    MCV 83.4 80.0 - 99.0 FL    MCH 27.9 26.0 - 34.0 PG MCHC 33.4 30.0 - 36.5 g/dL    RDW 11.7 11.5 - 14.5 %    PLATELET 747 282 - 313 K/uL    MPV 10.1 8.9 - 12.9 FL    NRBC 0.0 0  WBC    ABSOLUTE NRBC 0.00 0.00 - 0.01 K/uL    NEUTROPHILS 68 32 - 75 %    LYMPHOCYTES 25 12 - 49 %    MONOCYTES 5 5 - 13 %    EOSINOPHILS 0 0 - 7 %    BASOPHILS 0 0 - 1 %    IMMATURE GRANULOCYTES 0 0.0 - 0.5 %    ABS. NEUTROPHILS 7.3 1.8 - 8.0 K/UL    ABS. LYMPHOCYTES 2.7 0.8 - 3.5 K/UL    ABS. MONOCYTES 0.6 0.0 - 1.0 K/UL    ABS. EOSINOPHILS 0.0 0.0 - 0.4 K/UL    ABS. BASOPHILS 0.0 0.0 - 0.1 K/UL    ABS. IMM. GRANS. 0.0 0.00 - 0.04 K/UL    DF AUTOMATED     METABOLIC PANEL, COMPREHENSIVE    Collection Time: 04/26/18  1:25 AM   Result Value Ref Range    Sodium 137 136 - 145 mmol/L    Potassium 3.3 (L) 3.5 - 5.1 mmol/L    Chloride 100 97 - 108 mmol/L    CO2 28 21 - 32 mmol/L    Anion gap 9 5 - 15 mmol/L    Glucose 398 (H) 65 - 100 mg/dL    BUN 6 6 - 20 MG/DL    Creatinine 0.89 0.55 - 1.02 MG/DL    BUN/Creatinine ratio 7 (L) 12 - 20      GFR est AA >60 >60 ml/min/1.73m2    GFR est non-AA >60 >60 ml/min/1.73m2    Calcium 9.3 8.5 - 10.1 MG/DL    Bilirubin, total 0.6 0.2 - 1.0 MG/DL    ALT (SGPT) 15 12 - 78 U/L    AST (SGOT) 5 (L) 15 - 37 U/L    Alk. phosphatase 88 45 - 117 U/L    Protein, total 7.9 6.4 - 8.2 g/dL    Albumin 3.7 3.5 - 5.0 g/dL    Globulin 4.2 (H) 2.0 - 4.0 g/dL    A-G Ratio 0.9 (L) 1.1 - 2.2     GLUCOSE, POC    Collection Time: 04/26/18  2:39 AM   Result Value Ref Range    Glucose (POC) 374 (H) 65 - 100 mg/dL    Performed by Madonna Price POC    Collection Time: 04/26/18  4:02 AM   Result Value Ref Range    Glucose (POC) 188 (H) 65 - 100 mg/dL    Performed by Jazmyn Zuñiga        Radiologic Studies -   CT ABD PELV WO CONT   Final Result        CT Results  (Last 48 hours)               04/26/18 0202  CT ABD PELV WO CONT Final result    Impression:  IMPRESSION:    1. Decompressed bladder with concentric mural thickening.  Recommend clinical   correlation for cystitis. 2. Incidental findings as above. Narrative:  EXAM:  CT ABDOMEN PELVIS WITHOUT CONTRAST   INDICATION:     Additional history: Left-sided/lower abdominal pain and nausea which began 3   days previously. The patient has associated urinary symptoms of urgency,   dysuria, increased frequency and hesitancy. Patient has left flank pain, tender   to the touch. History of kidney stones. COMPARISON: CT of the lumbar spine, 8/10/2017. Vince Rankin TECHNIQUE:    Unenhanced multislice helical CT was performed from the diaphragm to the   symphysis pubis without intravenous contrast administration. Contiguous 5 mm   axial images were reconstructed and lung and soft tissue windows were generated. Coronal and sagittal reformations were generated. CT dose reduction was achieved through use of a standardized protocol tailored   for this examination and automatic exposure control for dose modulation. Vince Rankin FINDINGS:   INCIDENTALLY IMAGED CHEST:   Heart/vessels: Within normal limits. Lungs/Pleura: Within normal limits. .   ABDOMEN:   Liver: Within normal limits. Gallbladder/Biliary: Within normal limits. Spleen: Within normal limits. Pancreas: Within normal limits. Adrenals: Within normal limits. Kidneys: Within normal limits. Peritoneum/Mesenteries: Within normal limits. Extraperitoneum: Within normal limits. Gastrointestinal tract: Within normal limits. The appendix extends into the   pelvis which is at the upper limits of normal in size. Vascular: Within normal limits. Vince Rankin PELVIS:   Extraperitoneum: Calculi in the floor the pelvis suggesting phleboliths. Ureters: Within normal limits. Bladder: Decompressed bladder with concentric mural thickening. Reproductive System: Within normal limits. .   MSK:    Loreli Earthly, fat-containing umbilical hernia. Minimal degenerative change at L5/S1   .            CXR Results  (Last 48 hours)    None            Medical Decision Making   I am the first provider for this patient. I reviewed the vital signs, available nursing notes, past medical history, past surgical history, family history and social history. Vital Signs-Reviewed the patient's vital signs. Patient Vitals for the past 12 hrs:   Temp Pulse Resp BP SpO2   04/26/18 0400 - - - 122/83 100 %   04/26/18 0330 - - - 121/74 100 %   04/26/18 0300 - - - 116/72 98 %   04/26/18 0134 - 87 18 139/84 100 %   04/25/18 2357 97.8 °F (36.6 °C) 87 16 152/82 100 %       Pulse Oximetry Analysis - 100% on RA    Records Reviewed: Nursing Notes and Old Medical Records    Provider Notes (Medical Decision Making):   DDx UTI, pyelonephritis, lower suspicion for appendicitis, viral illness, ureteral stone, or kidney stone    ED Course:   Initial assessment performed. The patients presenting problems have been discussed, and they are in agreement with the care plan formulated and outlined with them. I have encouraged them to ask questions as they arise throughout their visit. Medications   potassium chloride SR (KLOR-CON 10) tablet 40 mEq (not administered)   sodium chloride 0.9 % bolus infusion 1,000 mL (1,000 mL IntraVENous New Bag 4/26/18 0245)   cefTRIAXone (ROCEPHIN) 1 g in 0.9% sodium chloride (MBP/ADV) 50 mL (0 g IntraVENous IV Completed 4/26/18 0403)   insulin regular (NOVOLIN R, HUMULIN R) injection 10 Units (10 Units IntraVENous Given 4/26/18 0259)   ketorolac (TORADOL) injection 15 mg (15 mg IntraVENous Given 4/26/18 0247)   oxyCODONE-acetaminophen (PERCOCET) 5-325 mg per tablet 2 Tab (2 Tabs Oral Given 4/26/18 0403)       Progress Note:  5:02 AM  Pt was verbally informed of incidental finding of small umbilical hernia, unrelated to her symptoms tonight. She expresses her understanding and agrees with the plan of care.    Written by MIGUEL Turner, as dictated by Melanie Rinne, MD.    Critical Care Time:   0    Disposition:  DISCHARGE NOTE  5:05 AM  The patient has been re-evaluated and is ready for discharge. Reviewed available results with patient. Counseled pt on diagnosis and care plan. Pt has expressed understanding, and all questions have been answered. Pt agrees with plan and agrees to F/U as recommended, or return to the ED if their sxs worsen. Discharge instructions have been provided and explained to the pt, along with reasons to return to the ED. Written by Briana Max, ED Scribe, as dictated by Laura Jones MD.    PLAN:  1. Current Discharge Medication List      START taking these medications    Details   oxyCODONE-acetaminophen (PERCOCET) 5-325 mg per tablet Take 1 Tab by mouth every four (4) hours as needed for Pain. Max Daily Amount: 6 Tabs. Qty: 12 Tab, Refills: 0    Associated Diagnoses: Suprapubic pain      cephALEXin (KEFLEX) 500 mg capsule Take 1 Cap by mouth three (3) times daily for 7 days. Qty: 21 Cap, Refills: 0      ibuprofen (MOTRIN) 600 mg tablet Take 1 Tab by mouth every eight (8) hours as needed for Pain. Qty: 30 Tab, Refills: 0    Associated Diagnoses: Suprapubic pain           2. Follow-up Information     Follow up With Details Comments [address], NP In 3 days  [address]  [phone]      [facility] EMERGENCY DEPT  If symptoms worsen [address] [place] [address]  [phone]        Return to ED if worse     Diagnosis     Clinical Impression:   1. Urinary tract infection with hematuria, site unspecified    2. Suprapubic pain    3. Cystitis    4. Dehydration    5. Hypokalemia        Attestations: This note is prepared by Alondra Dailey, acting as Scribe for Laura Jones MD.    The scribe's documentation has been prepared under my direction and personally reviewed by me in its entirety. I confirm that the note above accurately reflects all work, treatment, procedures, and medical decision making performed by me.   Laura Jones MD

## 2018-04-26 NOTE — LETTER
[address] 
[facility] EMERGENCY DEPT 
[address] [place] [address] [address] [id number] 
[phone] Work/School Note Date: 4/25/2018 To Whom It May concern: 
 
Cary Farooq was seen and treated today in the emergency room by the following provider(s): 
Attending Provider: Jose Davis MD.   
 
Cary Farooq will be out of work thru Friday, April 27. Sincerely, Jose Davis MD

## 2018-04-28 LAB
BACTERIA SPEC CULT: ABNORMAL
CC UR VC: ABNORMAL
SERVICE CMNT-IMP: ABNORMAL

## 2018-05-15 ENCOUNTER — HOSPITAL ENCOUNTER (EMERGENCY)
Age: 33
Discharge: HOME | End: 2018-05-15
Attending: EMERGENCY MEDICINE
Payer: SELF-PAY

## 2018-05-15 VITALS
OXYGEN SATURATION: 98 % | TEMPERATURE: 98.7 F | WEIGHT: 199.3 LBS | RESPIRATION RATE: 16 BRPM | SYSTOLIC BLOOD PRESSURE: 155 MMHG | BODY MASS INDEX: 33.2 KG/M2 | HEIGHT: 65 IN | DIASTOLIC BLOOD PRESSURE: 97 MMHG

## 2018-05-15 DIAGNOSIS — S39.012A STRAIN OF LUMBAR REGION, INITIAL ENCOUNTER: ICD-10-CM

## 2018-05-15 DIAGNOSIS — M54.31 SCIATICA, RIGHT SIDE: Primary | ICD-10-CM

## 2018-05-15 DIAGNOSIS — G89.29 CHRONIC BILATERAL LOW BACK PAIN WITH RIGHT-SIDED SCIATICA: ICD-10-CM

## 2018-05-15 DIAGNOSIS — G89.4 CHRONIC PAIN SYNDROME: ICD-10-CM
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
## 2018-05-15 DIAGNOSIS — M54.41 CHRONIC BILATERAL LOW BACK PAIN WITH RIGHT-SIDED SCIATICA: ICD-10-CM

## 2018-05-15 PROCEDURE — 99282 EMERGENCY DEPT VISIT SF MDM: CPT

## 2018-05-15 RX ORDER — DIFLUNISAL 500 MG/1
500 TABLET, FILM COATED ORAL 2 TIMES DAILY
Qty: 20 TAB | Refills: 0 | Status: SHIPPED | OUTPATIENT
Start: 2018-05-15 | End: 2018-05-29

## 2018-05-15 RX ORDER — METHOCARBAMOL 750 MG/1
750 TABLET, FILM COATED ORAL 4 TIMES DAILY
Qty: 20 TAB | Refills: 0 | Status: SHIPPED | OUTPATIENT
Start: 2018-05-15 | End: 2018-09-25

## 2018-05-15 NOTE — LETTER
[address] 
[facility] EMERGENCY DEPT 
[address] [place] [address] [facility] [id number] [phone] Work/School Note Date: 5/15/2018 To Whom It May concern: 
 
Carolina Noonan was seen and treated today in the emergency room by the following provider(s): 
Attending Provider: Rosamaria Montilla MD 
Physician Assistant: Maggy Wynne. Carolina Noonan may return to work on 5/17/2018. Sincerely, 
 
 
 
 
Maggy Wynne

## 2018-05-15 NOTE — DISCHARGE INSTRUCTIONS
Back Pain: Care Instructions  Your Care Instructions    Back pain has many possible causes. It is often related to problems with muscles and ligaments of the back. It may also be related to problems with the nerves, discs, or bones of the back. Moving, lifting, standing, sitting, or sleeping in an awkward way can strain the back. Sometimes you don't notice the injury until later. Arthritis is another common cause of back pain. Although it may hurt a lot, back pain usually improves on its own within several weeks. Most people recover in 12 weeks or less. Using good home treatment and being careful not to stress your back can help you feel better sooner. Follow-up care is a key part of your treatment and safety. Be sure to make and go to all appointments, and call your doctor if you are having problems. It's also a good idea to know your test results and keep a list of the medicines you take. How can you care for yourself at home? · Sit or lie in positions that are most comfortable and reduce your pain. Try one of these positions when you lie down:  ¨ Lie on your back with your knees bent and supported by large pillows. ¨ Lie on the floor with your legs on the seat of a sofa or chair. Clement Gregg on your side with your knees and hips bent and a pillow between your legs. ¨ Lie on your stomach if it does not make pain worse. · Do not sit up in bed, and avoid soft couches and twisted positions. Bed rest can help relieve pain at first, but it delays healing. Avoid bed rest after the first day of back pain. · Change positions every 30 minutes. If you must sit for long periods of time, take breaks from sitting. Get up and walk around, or lie in a comfortable position. · Try using a heating pad on a low or medium setting for 15 to 20 minutes every 2 or 3 hours. Try a warm shower in place of one session with the heating pad. · You can also try an ice pack for 10 to 15 minutes every 2 to 3 hours.  Put a thin cloth between the ice pack and your skin. · Take pain medicines exactly as directed. ¨ If the doctor gave you a prescription medicine for pain, take it as prescribed. ¨ If you are not taking a prescription pain medicine, ask your doctor if you can take an over-the-counter medicine. · Take short walks several times a day. You can start with 5 to 10 minutes, 3 or 4 times a day, and work up to longer walks. Walk on level surfaces and avoid hills and stairs until your back is better. · Return to work and other activities as soon as you can. Continued rest without activity is usually not good for your back. · To prevent future back pain, do exercises to stretch and strengthen your back and stomach. Learn how to use good posture, safe lifting techniques, and proper body mechanics. When should you call for help? Call your doctor now or seek immediate medical care if:  ? · You have new or worsening numbness in your legs. ? · You have new or worsening weakness in your legs. (This could make it hard to stand up.)   ? · You lose control of your bladder or bowels. ? Watch closely for changes in your health, and be sure to contact your doctor if:  ? · Your pain gets worse. ? · You are not getting better after 2 weeks. Where can you learn more? Go to http://asael-sloan.info/. Enter A448 in the search box to learn more about \"Back Pain: Care Instructions. \"  Current as of: March 21, 2017  Content Version: 11.4  © 6118-5284 [employer]. Care instructions adapted under license by [employer] (which disclaims liability or warranty for this information). If you have questions about a medical condition or this instruction, always ask your healthcare professional. [place] [address] any warranty or liability for your use of this information. Learning About Relief for Back Pain  What is back tension and strain?     Back strain happens when you overstretch, or pull, a muscle in your back. You may hurt your back in an accident or when you exercise or lift something. Most back pain will get better with rest and time. You can take care of yourself at home to help your back heal.  What can you do first to relieve back pain? When you first feel back pain, try these steps:  · Walk. Take a short walk (10 to 20 minutes) on a level surface (no slopes, hills, or stairs) every 2 to 3 hours. Walk only distances you can manage without pain, especially leg pain. · Relax. Find a comfortable position for rest. Some people are comfortable on the floor or a medium-firm bed with a small pillow under their head and another under their knees. Some people prefer to lie on their side with a pillow between their knees. Don't stay in one position for too long. · Try heat or ice. Try using a heating pad on a low or medium setting, or take a warm shower, for 15 to 20 minutes every 2 to 3 hours. Or you can buy single-use heat wraps that last up to 8 hours. You can also try an ice pack for 10 to 15 minutes every 2 to 3 hours. You can use an ice pack or a bag of frozen vegetables wrapped in a thin towel. There is not strong evidence that either heat or ice will help, but you can try them to see if they help. You may also want to try switching between heat and cold. · Take pain medicine exactly as directed. ¨ If the doctor gave you a prescription medicine for pain, take it as prescribed. ¨ If you are not taking a prescription pain medicine, ask your doctor if you can take an over-the-counter medicine. What else can you do? · Stretch and exercise. Exercises that increase flexibility may relieve your pain and make it easier for your muscles to keep your spine in a good, neutral position. And don't forget to keep walking. · Do self-massage. You can use self-massage to unwind after work or school or to energize yourself in the morning. You can easily massage your feet, hands, or neck. Self-massage works best if you are in comfortable clothes and are sitting or lying in a comfortable position. Use oil or lotion to massage bare skin. · Reduce stress. Back pain can lead to a vicious [place]: Distress about the pain tenses the muscles in your back, which in turn causes more pain. Learn how to relax your mind and your muscles to lower your stress. Where can you learn more? Go to http://asael-sloan.info/. Enter F953 in the search box to learn more about \"Learning About Relief for Back Pain. \"  Current as of: March 21, 2017  Content Version: 11.4  © 3751-5823 [employer]. Care instructions adapted under license by [employer] (which disclaims liability or warranty for this information). If you have questions about a medical condition or this instruction, always ask your healthcare professional. [address] any warranty or liability for your use of this information. Sciatica: Exercises  Your Care Instructions  Here are some examples of typical rehabilitation exercises for your condition. Start each exercise slowly. Ease off the exercise if you start to have pain. Your doctor or physical therapist will tell you when you can start these exercises and which ones will work best for you. When you are not being active, find a comfortable position for rest. Some people are comfortable on the floor or a medium-firm bed with a small pillow under their head and another under their knees. Some people prefer to lie on their side with a pillow between their knees. Don't stay in one position for too long. Take short walks (10 to 20 minutes) every 2 to 3 hours. Avoid slopes, hills, and stairs until you feel better. Walk only distances you can manage without pain, especially leg pain. How to do the exercises  Back stretches    1. Get down on your hands and knees on the floor. 2. Relax your head and allow it to droop.  Round your back up toward the ceiling until you feel a nice stretch in your upper, middle, and lower back. Hold this stretch for as long as it feels comfortable, or about 15 to 30 seconds. 3. Return to the starting position with a flat back while you are on your hands and knees. 4. Let your back sway by pressing your stomach toward the floor. Lift your buttocks toward the ceiling. 5. Hold this position for 15 to 30 seconds. 6. Repeat 2 to 4 times. Follow-up care is a key part of your treatment and safety. Be sure to make and go to all appointments, and call your doctor if you are having problems. It's also a good idea to know your test results and keep a list of the medicines you take. Where can you learn more? Go to http://asael-sloan.info/. Enter W467 in the search box to learn more about \"Sciatica: Exercises. \"  Current as of: March 21, 2017  Content Version: 11.4  © 3830-8154 [employer]. Care instructions adapted under license by [employer] (which disclaims liability or warranty for this information). If you have questions about a medical condition or this instruction, always ask your healthcare professional. [place] [address] any warranty or liability for your use of this information. Sciatica: Care Instructions  Your Care Instructions    Sciatica (say \"qjf-DX-dh-kuh\") is an irritation of one of the sciatic nerves, which come from the spinal cord in the lower back. The sciatic nerves and their branches extend down through the buttock to the foot. Sciatica can develop when an injured disc in the back presses against a spinal nerve root. Its main symptom is pain, numbness, or weakness that is often worse in the leg or foot than in the back. Sciatica often will improve and go away with time. Early treatment usually includes medicines and exercises to relieve pain. Follow-up care is a key part of your treatment and safety.  Be sure to make and go to all appointments, and call your doctor if you are having problems. It's also a good idea to know your test results and keep a list of the medicines you take. How can you care for yourself at home? · Take pain medicines exactly as directed. ¨ If the doctor gave you a prescription medicine for pain, take it as prescribed. ¨ If you are not taking a prescription pain medicine, ask your doctor if you can take an over-the-counter medicine. · Use heat or ice to relieve pain. ¨ To apply heat, put a warm water bottle, heating pad set on low, or warm cloth on your back. Do not go to sleep with a heating pad on your skin. ¨ To use ice, put ice or a cold pack on the area for 10 to 20 minutes at a time. Put a thin cloth between the ice and your skin. · Avoid sitting if possible, unless it feels better than standing. · Alternate lying down with short walks. Increase your walking distance as you are able to without making your symptoms worse. · Do not do anything that makes your symptoms worse. When should you call for help? Call 911 anytime you think you may need emergency care. For example, call if:  · You are unable to move a leg at all. Call your doctor now or seek immediate medical care if:  · You have new or worse symptoms in your legs or buttocks. Symptoms may include:  ¨ Numbness or tingling. ¨ Weakness. ¨ Pain. · You lose bladder or bowel control. Watch closely for changes in your health, and be sure to contact your doctor if:  · You are not getting better as expected. Where can you learn more? Go to http://asael-sloan.info/. Enter [phone] in the search box to learn more about \"Sciatica: Care Instructions. \"  Current as of: March 21, 2017  Content Version: 11.4  © 1628-3745 [employer]. Care instructions adapted under license by [employer] (which disclaims liability or warranty for this information).  If you have questions about a medical condition or this instruction, always ask your healthcare professional. [place] [address] any warranty or liability for your use of this information.

## 2018-05-15 NOTE — ED NOTES
Received patient to exam room, sitting in a chair in a position of comfort, call bell within reach; pt states she fell Sunday morning, states \"there is a weak spot in the floor and and my right leg fell through the floor\", pt is c/o mid back pain since, states the pain radiates down her right leg, denies incontinence, pt reports a chronic back condition

## 2018-05-15 NOTE — ED PROVIDER NOTES
EMERGENCY DEPARTMENT HISTORY AND PHYSICAL EXAM      Date: 5/15/2018  Patient Name: Waylon Caller    History of Presenting Illness     Chief Complaint   Patient presents with    Back Pain     Reports mid to low back pain x Sunday after fall at home Pt states she has \"weak\" floor and \"went through it\" with her R leg on Sun Pt states yesterday her sciatica began to act up       History Provided By: Patient    HPI: Waylon Caller, 27 y.o. female with PMHx significant for chronic low back pain, presents ambulatory to [id number] [place] [address] ED with cc of exacerbation of her chronic low back pain s/p a GLF that occurred 3 days ago. She states that her pain is currently a 9/10, and it is a sharp pain that radiates down her right leg. She states that she was rushing to get to [employer], and she tripped and fell backwards onto her low back. She states that her pain feels similar to her chronic low back pain with no changes. She reports taking Naprosyn, Ibuprofen, and Tylenol with no relief in pain. She reports seeing her PCP 3 months ago. She denies following up with Orthopedic Surgery. She specifically denies fevers, chills, saddle anesthesia, urinary/fecal incontinence, or other complaints at this time. There are no other complaints, changes, or physical findings at this time. PCP: Kusum Holt NP    Current Outpatient Prescriptions   Medication Sig Dispense Refill    methocarbamol (ROBAXIN-750) 750 mg tablet Take 1 Tab by mouth four (4) times daily. 20 Tab 0    diflunisal (DOLOBID) 500 mg tab Take 1 Tab by mouth two (2) times a day. 20 Tab 0    metFORMIN (GLUCOPHAGE) 1,000 mg tablet Take 1,000 mg by mouth two (2) times daily (with meals).          Past History     Past Medical History:  Past Medical History:   Diagnosis Date    Abnormal Pap smear     abnormal pap at 15 y/o normal since     Back pain 2015 feb    [facility]     Diabetes mellitus     Gestational Diabetes with last two pregnancies-diet controlled       Past Surgical History:  Past Surgical History:   Procedure Laterality Date    HX  SECTION      HX CHOLECYSTECTOMY      HX OTHER SURGICAL      cholecystectomy 2002       Family History:  Family History   Problem Relation Age of Onset    Diabetes Father     Hypertension Father     Stroke Father     Kidney Disease Father        Social History:  Social History   Substance Use Topics    Smoking status: Never Smoker    Smokeless tobacco: Never Used    Alcohol use No       Allergies: Allergies   Allergen Reactions    Tramadol Hives       Review of Systems   Review of Systems   Constitutional: Negative. Negative for activity change, appetite change, chills, diaphoresis, fever and unexpected weight change. HENT: Negative for congestion, hearing loss, rhinorrhea, sinus pressure, sneezing, sore throat and trouble swallowing. Eyes: Negative for pain, redness, itching and visual disturbance. Respiratory: Negative for cough, shortness of breath and wheezing. Cardiovascular: Negative for chest pain, palpitations and leg swelling. Gastrointestinal: Negative for abdominal pain, constipation, diarrhea, nausea and vomiting. Genitourinary: Negative for dysuria. Musculoskeletal: Positive for back pain. Negative for arthralgias, gait problem and myalgias. Skin: Negative for color change, pallor, rash and wound. Neurological: Negative for tremors, weakness, light-headedness, numbness and headaches. Denies urinary/fecal incontinence  Denies saddle anesthesia   All other systems reviewed and are negative. Physical Exam   Physical Exam   Constitutional: She is oriented to person, place, and time. Vital signs are normal. She appears well-developed and well-nourished. No distress. 27 y.o. female in NAD  Communicates appropriately and in full sentences   HENT:   Head: Normocephalic and atraumatic.    Eyes: Conjunctivae are normal. Pupils are equal, round, and reactive to light. Right eye exhibits no discharge. Left eye exhibits no discharge. Neck: Normal range of motion. Neck supple. No nuchal rigidity   Cardiovascular: Normal rate, regular rhythm and intact distal pulses. Pulmonary/Chest: Effort normal and breath sounds normal. No respiratory distress. She has no wheezes. Abdominal: Soft. Bowel sounds are normal. She exhibits no distension. There is no tenderness. Musculoskeletal: Normal range of motion. She exhibits no edema or deformity. No neurologic, motor, vascular, or compartment embarrassment observed on exam. No focal neurologic deficits. BACK: Normal spinal curvatures. No step off or deformity. NT to palpation along midline. (+)seated SLR on the right side. Flexion/extension movement's at pt's baseline. Ambulatory without difficulty. Neurological: She is alert and oriented to person, place, and time. Coordination normal.   No focal neuro deficits. NVI. Neurologically intact of UE and LE B/L  Sensation intact and symmetrical of UE and LE B/L. Strength 5/5 of UE B/L, Strength 5/5 of LE B/L. Symmetric bulk and tone of LE muscle groups. Skin: Skin is warm and dry. No rash noted. She is not diaphoretic. No erythema. No pallor. Psychiatric: She has a normal mood and affect. Nursing note and vitals reviewed. Medical Decision Making   I am the first provider for this patient. I reviewed the vital signs, available nursing notes, past medical history, past surgical history, family history and social history. Vital Signs-Reviewed the patient's vital signs.   Patient Vitals for the past 12 hrs:   Temp Resp BP SpO2   05/15/18 1239 98.7 °F (37.1 °C) 16 (!) 155/97 98 %       Records Reviewed: Nursing Notes, Old Medical Records and Previous Radiology Studies    Provider Notes (Medical Decision Making):   DDx: lumbar strain, lumbar sprain, HNP, DDD, DJD, spondylolysis, spondylolisthesis, muscle spasm, compression fracture, malingering, chronic back pain. Per the patient, the ILAN was mechanical in nature. Exam reassuring and pt without red-flag S/Sx. Do not suspect infectious, neoplastic, abdominal, , cardiopulmonary or progressive neurological etiology of back pain. ED Course:   Initial assessment performed. The patients presenting problems have been discussed, and they are in agreement with the care plan formulated and outlined with them. I have encouraged them to ask questions as they arise throughout their visit. Progress Note:  1:30 PM  Given the patient's recent injury, she was offered plain films, but she declines at this time. She states that \"nothing feels broken. \" Pt will be provided with resources for Orthopedic Surgery and free clinics at time of discharge. Written by MIGUEL Clifford, as dictated by Cece Yeung PA-C. Critical Care Time: 0 minutes    Discharge Note:  1:34 PM  The pt is ready for discharge. The pt's signs, symptoms, diagnosis, and discharge instructions have been discussed and pt has conveyed their understanding. The pt is to follow up as recommended or return to ER should their symptoms worsen. Plan has been discussed and pt is in agreement. PLAN:  1. Discharge Medication List as of 5/15/2018  1:35 PM      START taking these medications    Details   methocarbamol (ROBAXIN-750) 750 mg tablet Take 1 Tab by mouth four (4) times daily. , Normal, Disp-20 Tab, R-0      diflunisal (DOLOBID) 500 mg tab Take 1 Tab by mouth two (2) times a day., Normal, Disp-20 Tab, R-0         CONTINUE these medications which have NOT CHANGED    Details   metFORMIN (GLUCOPHAGE) 1,000 mg tablet Take 1,000 mg by mouth two (2) times daily (with meals). , Historical Med           2.    Follow-up Information     Follow up With Details Comments [address], NP Schedule an appointment as soon as possible for a visit in 2 days As needed, Possible further evaluation and treatment, If symptoms worsen [address] [facility]  [phone]      [facility] EMERGENCY DEPT Go to As needed, If symptoms worsen [address] [place] [address]  [phone]        Return to ED if worse     Diagnosis     Clinical Impression:   1. Sciatica, right side    2. Strain of lumbar region, initial encounter    3. Fall through floor, initial encounter    4. Chronic bilateral low back pain with right-sided sciatica    5. Chronic pain syndrome        Attestations: This note is prepared by Sallie Caal, acting as a Scribe for [employer]. [facility]: The scribe's documentation has been prepared under my direction and personally reviewed by me in its entirety. I confirm that the notes above accurately reflects all work, treatment, procedures, and medical decision making performed by me.    6:10 AM  I was personally available for consultation in the emergency department. I have reviewed the chart and agree with the documentation recorded by the [facility], including the assessment, treatment plan, and disposition. Farhat Cortez MD        This note will not be viewable in [address]

## 2018-05-29 ENCOUNTER — OFFICE VISIT (OUTPATIENT)
Dept: URGENT CARE | Age: 33
End: 2018-05-29

## 2018-05-29 VITALS
SYSTOLIC BLOOD PRESSURE: 141 MMHG | TEMPERATURE: 97.1 F | HEIGHT: 65 IN | WEIGHT: 199 LBS | BODY MASS INDEX: 33.15 KG/M2 | HEART RATE: 126 BPM | DIASTOLIC BLOOD PRESSURE: 91 MMHG | OXYGEN SATURATION: 100 % | RESPIRATION RATE: 16 BRPM

## 2018-05-29 DIAGNOSIS — S80.02XA CONTUSION OF LEFT KNEE, INITIAL ENCOUNTER: Primary | ICD-10-CM
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
## 2018-05-29 RX ORDER — KETOROLAC TROMETHAMINE 30 MG/ML
30 INJECTION, SOLUTION INTRAMUSCULAR; INTRAVENOUS ONCE
Status: COMPLETED | OUTPATIENT
Start: 2018-05-29 | End: 2018-05-29

## 2018-05-29 RX ADMIN — KETOROLAC TROMETHAMINE 30 MG: 30 INJECTION, SOLUTION INTRAMUSCULAR; INTRAVENOUS at 18:25

## 2018-05-29 NOTE — MR AVS SNAPSHOT
[address] [id number] 
[phone] Patient: Anirudh Easley MRN: [id number] OSBORN:[birth year] Visit Information Date & Time Provider Department Dept. Phone Encounter #  
 5/29/2018  5:30 PM [address] [facility] [phone] [id number] Upcoming Health Maintenance Date Due  
 LIPID PANEL Q1 1983 FOOT EXAM Q1 7/20/1993 MICROALBUMIN Q1 7/20/1993 EYE EXAM RETINAL OR DILATED Q1 7/20/1993 Pneumococcal 19-64 Medium Risk (1 of 1 - PPSV23) 7/20/2002 DTaP/Tdap/Td series (1 - Tdap) 7/20/2004 PAP AKA CERVICAL CYTOLOGY 7/20/2004 HEMOGLOBIN A1C Q6M 1/21/2016 Influenza Age 5 to Adult 8/1/2018 Allergies as of 5/29/2018  Review Complete On: 5/29/2018 By: Ilene Woods MD  
  
 Severity Noted Reaction Type Reactions Tramadol  05/22/2017    Hives Current Immunizations  Never Reviewed Name Date Influenza Vaccine Page Echevaria) 11/5/2014 Not reviewed this visit You Were Diagnosed With   
  
 Codes Comments Contusion of left knee, initial encounter    -  Primary ICD-10-CM: G26.62HT ICD-9-CM: 924.11 Fall, initial encounter     ICD-10-CM: W19. [place] ICD-9-CM: E888.9 Vitals BP Pulse Temp Resp Height(growth percentile) Weight(growth percentile) (!) 141/91 (!) 126 97.1 °F (36.2 °C) 16 5' 5\" (1.651 m) 199 lb (90.3 kg) LMP SpO2 BMI OB Status Smoking Status 05/08/2018 100% 33.12 kg/m2 Having regular periods Never Smoker Vitals History BMI and BSA Data Body Mass Index Body Surface Area  
 33.12 kg/m 2 2.04 m 2 Preferred Pharmacy Pharmacy Name Phone [facility] [address], [address] [phone] Your Updated Medication List  
  
   
This list is accurate as of 5/29/18  6:29 PM.  Always use your most recent med list.  
  
  
  
  
 metFORMIN 1,000 mg tablet Commonly known as:  GLUCOPHAGE  
 Take 1,000 mg by mouth two (2) times daily (with meals). methocarbamol 750 mg tablet Commonly known as:  [id number] Take 1 Tab by mouth four (4) times daily. We Performed the Following ACE WRAP [SUP6 Custom] CRUTCHES [SUP44 Custom] To-Do List   
 05/29/2018 Imaging:  XR KNEE LT 3 V Patient Instructions Follow up with ortho - list given Contusion: Care Instructions Your Care Instructions Contusion is the medical term for a bruise. It is the result of a direct blow or an impact, such as a fall. Contusions are common sports injuries. Most people think of a bruise as a black-and-blue spot. This happens when small blood vessels get torn and leak blood under the skin. But bones, muscles, and organs can also get bruised. This may damage deep tissues but not cause a bruise you can see. The doctor will do a physical exam to find the location of your contusion. You may also have tests to make sure you do not have a more serious injury, such as a broken bone or nerve damage. These may include X-rays or other imaging tests like a CT scan or MRI. Deep-tissue contusions may cause pain and swelling. But if there is no serious damage, they will often get better in a few weeks with home treatment. The doctor has checked you carefully, but problems can develop later. If you notice any problems or new symptoms, get medical treatment right away. Follow-up care is a key part of your treatment and safety. Be sure to make and go to all appointments, and call your doctor if you are having problems. It's also a good idea to know your test results and keep a list of the medicines you take. How can you care for yourself at home? · Put ice or a cold pack on the sore area for 10 to 20 minutes at a time to stop swelling. Put a thin cloth between the ice pack and your skin. · Be safe with medicines. Read and follow all instructions on the label. ¨ If the doctor gave you a prescription medicine for pain, take it as prescribed. ¨ If you are not taking a prescription pain medicine, ask your doctor if you can take an over-the-counter medicine. · If you can, prop up the sore area on pillows as much as possible for the next few days. Try to keep the sore area above the level of your heart. When should you call for help? Call your doctor now or seek immediate medical care if: 
? · Your pain gets worse. ? · You have new or worse swelling. ? · You have tingling, weakness, or numbness in the area near the contusion. ? · The area near the contusion is cold or pale. ? Watch closely for changes in your health, and be sure to contact your doctor if: 
? · You do not get better as expected. Where can you learn more? Go to http://asael-sloan.info/. Enter Z160 in the search box to learn more about \"Contusion: Care Instructions. \" Current as of: March 20, 2017 Content Version: 11.4 © 7641-8544 [employer]. Care instructions adapted under license by [employer] (which disclaims liability or warranty for this information). If you have questions about a medical condition or this instruction, always ask your healthcare professional. [address] any warranty or liability for your use of this information. Introducing [facility]! Anson Zarco introduces [employer] patient portal. Now you can access parts of your medical record, email your doctor's office, and request medication refills online. 1. In your internet browser, go to [email or web address] 2. Click on the First Time User? Click Here link in the Sign In box. You will see the New Member Sign Up page. 3. Enter your [employer] Access Code exactly as it appears below. You will not need to use this code after youve completed the sign-up process.  If you do not sign up before the expiration date, you must request a new code. · [employer] Access Code: [email or web address] Expires: 7/7/2018  2:28 PM 
 
4. Enter the last four digits of your Social Security Number (xxxx) and Date of Birth (mm/dd/yyyy) as indicated and click Submit. You will be taken to the next sign-up page. 5. Create a [employer] ID. This will be your [employer] login ID and cannot be changed, so think of one that is secure and easy to remember. 6. Create a [employer] password. You can change your password at any time. 7. Enter your Password Reset Question and Answer. This can be used at a later time if you forget your password. 8. Enter your e-mail address. You will receive e-mail notification when new information is available in [address] 9. Click Sign Up. You can now view and download portions of your medical record. 10. Click the Download Summary menu link to download a portable copy of your medical information. If you have questions, please visit the Frequently Asked Questions section of the [employer] website. Remember, [employer] is NOT to be used for urgent needs. For medical emergencies, dial 911. Now available from your iPhone and Android! Please provide this summary of care documentation to your next provider. Your primary care clinician is listed as Clyde Mcelroy. If you have any questions after today's visit, please call [phone].

## 2018-05-29 NOTE — PROGRESS NOTES
HPI Comments: Negrito Iqbal is a 27 y.o. female who sustained a left knee injury 30min PTA while at work, was wearing wedges walking down steps and twisted right ankle falling on left knee. Unable to bear weight or bend knee due to the pain. Has not taken any medication for the pain yet. The history is provided by the patient. Past Medical History:   Diagnosis Date    Abnormal Pap smear     abnormal pap at 13 y/o normal since     [facility] pain 2015    [facility]     Diabetes mellitus     Gestational Diabetes with last two pregnancies-diet controlled        Past Surgical History:   Procedure Laterality Date    HX  SECTION      HX CHOLECYSTECTOMY      HX OTHER SURGICAL      cholecystectomy          Family History   Problem Relation Age of Onset    Diabetes Father     Hypertension Father     Stroke Father     Kidney Disease Father         Social History     Social History    Marital status: SINGLE     Spouse name: N/A    Number of children: N/A    Years of education: N/A     Occupational History    Not on file. Social History Main Topics    Smoking status: Never Smoker    Smokeless tobacco: Never Used    Alcohol use No    Drug use: No    Sexual activity: Yes     Birth control/ protection: None     Other Topics Concern    Not on file     Social History Narrative                ALLERGIES: Tramadol    Review of Systems   Musculoskeletal: Positive for arthralgias and joint swelling. Skin: Negative for color change and wound. Neurological: Negative for weakness and numbness. Vitals:    18 1734 18 1737   BP:  (!) 141/91   Pulse:  (!) 126   Resp:  16   Temp:  97.1 °F (36.2 °C)   SpO2:  100%   Weight: 199 lb (90.3 kg) 199 lb (90.3 kg)   Height: 5' 5\" (1.651 m) 5' 5\" (1.651 m)       Physical Exam   Constitutional: She appears well-developed and well-nourished. She appears distressed.    Musculoskeletal:        Left knee: She exhibits decreased range of motion (unable to flex due to pain), swelling and bony tenderness. She exhibits no ecchymosis, no deformity, no laceration, no erythema and normal alignment. Tenderness found. Medial joint line, lateral joint line, MCL, LCL and patellar tendon tenderness noted. Neurological: She is alert. Skin: She is not diaphoretic. Psychiatric: She has a normal mood and affect. Her behavior is normal. Judgment and thought content normal.   Nursing note and vitals reviewed. MDM    Procedures        ICD-10-CM ICD-9-CM    1. Contusion of left knee, initial encounter S80. 02XA 924.11 CRUTCHES      ACE WRAP   2. Fall, initial encounter [address]. XXXA E888.9 XR KNEE LT 3 V     Medications Ordered Today   Medications    ketorolac tromethamine (TORADOL) 60 mg/2 mL injection 30 mg     The patients condition was discussed with the patient and they understand. The patient is to follow up with Ortho - list given. If signs and symptoms become worse the pt is to go to the ER. The patient is to take medications as prescribed. XR Results (most recent):    Results from Appointment encounter on 05/29/18   XR KNEE LT 3 V   Narrative INDICATION:  fall today     Exam: AP, lateral, oblique views of the left knee. FINDINGS: There is no acute fracture-dislocation. Articulations are normal.  Bones are well-mineralized. Soft tissues are normal.         Impression IMPRESSION: No acute fracture or dislocation.

## 2018-05-29 NOTE — PATIENT INSTRUCTIONS
Follow up with ortho - list given     Contusion: Care Instructions  Your Care Instructions    Contusion is the medical term for a bruise. It is the result of a direct blow or an impact, such as a fall. Contusions are common sports injuries. Most people think of a bruise as a black-and-blue spot. This happens when small blood vessels get torn and leak blood under the skin. But bones, muscles, and organs can also get bruised. This may damage deep tissues but not cause a bruise you can see. The doctor will do a physical exam to find the location of your contusion. You may also have tests to make sure you do not have a more serious injury, such as a broken bone or nerve damage. These may include X-rays or other imaging tests like a CT scan or MRI. Deep-tissue contusions may cause pain and swelling. But if there is no serious damage, they will often get better in a few weeks with home treatment. The doctor has checked you carefully, but problems can develop later. If you notice any problems or new symptoms, get medical treatment right away. Follow-up care is a key part of your treatment and safety. Be sure to make and go to all appointments, and call your doctor if you are having problems. It's also a good idea to know your test results and keep a list of the medicines you take. How can you care for yourself at home? · Put ice or a cold pack on the sore area for 10 to 20 minutes at a time to stop swelling. Put a thin cloth between the ice pack and your skin. · Be safe with medicines. Read and follow all instructions on the label. ¨ If the doctor gave you a prescription medicine for pain, take it as prescribed. ¨ If you are not taking a prescription pain medicine, ask your doctor if you can take an over-the-counter medicine. · If you can, prop up the sore area on pillows as much as possible for the next few days. Try to keep the sore area above the level of your heart. When should you call for help?   Call your doctor now or seek immediate medical care if:  ? · Your pain gets worse. ? · You have new or worse swelling. ? · You have tingling, weakness, or numbness in the area near the contusion. ? · The area near the contusion is cold or pale. ? Watch closely for changes in your health, and be sure to contact your doctor if:  ? · You do not get better as expected. Where can you learn more? Go to http://asael-sloan.info/. Enter T213 in the search box to learn more about \"Contusion: Care Instructions. \"  Current as of: March 20, 2017  Content Version: 11.4  © 7194-8831 [employer]. Care instructions adapted under license by [employer] (which disclaims liability or warranty for this information). If you have questions about a medical condition or this instruction, always ask your healthcare professional. [address] any warranty or liability for your use of this information.

## 2018-09-25 ENCOUNTER — HOSPITAL ENCOUNTER (EMERGENCY)
Age: 33
Discharge: HOME | End: 2018-09-25
Attending: EMERGENCY MEDICINE
Payer: SELF-PAY

## 2018-09-25 ENCOUNTER — APPOINTMENT (OUTPATIENT)
Dept: CT IMAGING | Age: 33
End: 2018-09-25
Payer: SELF-PAY

## 2018-09-25 ENCOUNTER — APPOINTMENT (OUTPATIENT)
Dept: GENERAL RADIOLOGY | Age: 33
End: 2018-09-25
Payer: SELF-PAY

## 2018-09-25 VITALS
HEIGHT: 65 IN | TEMPERATURE: 98.8 F | SYSTOLIC BLOOD PRESSURE: 144 MMHG | HEART RATE: 98 BPM | RESPIRATION RATE: 16 BRPM | BODY MASS INDEX: 34.53 KG/M2 | WEIGHT: 207.23 LBS | OXYGEN SATURATION: 100 % | DIASTOLIC BLOOD PRESSURE: 85 MMHG

## 2018-09-25 DIAGNOSIS — E11.9 TYPE 2 DIABETES MELLITUS WITHOUT COMPLICATION, WITH LONG-TERM CURRENT USE OF INSULIN (HCC): ICD-10-CM
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
## 2018-09-25 DIAGNOSIS — M50.30 DDD (DEGENERATIVE DISC DISEASE), CERVICAL: ICD-10-CM

## 2018-09-25 DIAGNOSIS — M54.2 NECK PAIN: Primary | ICD-10-CM

## 2018-09-25 DIAGNOSIS — Z79.4 TYPE 2 DIABETES MELLITUS WITHOUT COMPLICATION, WITH LONG-TERM CURRENT USE OF INSULIN (HCC): ICD-10-CM

## 2018-09-25 DIAGNOSIS — M54.6 ACUTE RIGHT-SIDED THORACIC BACK PAIN: ICD-10-CM

## 2018-09-25 PROCEDURE — 72125 CT NECK SPINE W/O DYE: CPT

## 2018-09-25 PROCEDURE — 96372 THER/PROPH/DIAG INJ SC/IM: CPT

## 2018-09-25 PROCEDURE — 99282 EMERGENCY DEPT VISIT SF MDM: CPT

## 2018-09-25 PROCEDURE — 72050 X-RAY EXAM NECK SPINE 4/5VWS: CPT

## 2018-09-25 PROCEDURE — 74011250636 HC RX REV CODE- 250/636: Performed by: PHYSICIAN ASSISTANT

## 2018-09-25 RX ORDER — HYDROCODONE BITARTRATE AND ACETAMINOPHEN 5; 325 MG/1; MG/1
1 TABLET ORAL
Qty: 10 TAB | Refills: 0 | Status: SHIPPED | OUTPATIENT
Start: 2018-09-25 | End: 2018-10-28

## 2018-09-25 RX ORDER — PREDNISONE 10 MG/1
TABLET ORAL
Qty: 21 TAB | Refills: 0 | Status: SHIPPED | OUTPATIENT
Start: 2018-09-25 | End: 2018-10-28

## 2018-09-25 RX ORDER — IBUPROFEN 600 MG/1
600 TABLET ORAL
Qty: 20 TAB | Refills: 0 | Status: SHIPPED | OUTPATIENT
Start: 2018-09-25 | End: 2018-10-15

## 2018-09-25 RX ORDER — METHOCARBAMOL 750 MG/1
750 TABLET, FILM COATED ORAL 3 TIMES DAILY
Qty: 15 TAB | Refills: 0 | Status: SHIPPED | OUTPATIENT
Start: 2018-09-25 | End: 2018-09-30

## 2018-09-25 RX ORDER — KETOROLAC TROMETHAMINE 30 MG/ML
30 INJECTION, SOLUTION INTRAMUSCULAR; INTRAVENOUS
Status: COMPLETED | OUTPATIENT
Start: 2018-09-25 | End: 2018-09-25

## 2018-09-25 RX ADMIN — KETOROLAC TROMETHAMINE 30 MG: 30 INJECTION, SOLUTION INTRAMUSCULAR at 11:57
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
## 2018-09-25 NOTE — LETTER
[facility] EMERGENCY DEPT 
[address] [place] [address] [address] [id number] [phone] Work/School Note Date: 9/25/2018 To Whom It May concern: 
 
Andi Gasca was seen and treated today in the emergency room. She may return to work in 1 to 2 days, as symptoms improve. Sincerely, Brady Cramer

## 2018-09-25 NOTE — ED NOTES
Assumed care of pt. Pt reports pain of neck is 8/10. Pt going to CT next. Pt denies being pregnant. Will sign waiver form.
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
## 2018-09-25 NOTE — ED NOTES
ANDREW Barboza have reviewed discharge instructions with the patient. The patient verbalized understanding. Pt leaving alert oriented and ambulatory and driving self.
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
## 2018-10-28 ENCOUNTER — HOSPITAL ENCOUNTER (EMERGENCY)
Age: 33
Discharge: HOME | End: 2018-10-28
Attending: EMERGENCY MEDICINE
Payer: SELF-PAY

## 2018-10-28 VITALS
TEMPERATURE: 98.4 F | BODY MASS INDEX: 34.05 KG/M2 | HEIGHT: 65 IN | OXYGEN SATURATION: 100 % | RESPIRATION RATE: 16 BRPM | SYSTOLIC BLOOD PRESSURE: 145 MMHG | HEART RATE: 101 BPM | WEIGHT: 204.37 LBS | DIASTOLIC BLOOD PRESSURE: 81 MMHG

## 2018-10-28 DIAGNOSIS — S61.412D LACERATION OF LEFT HAND, FOREIGN BODY PRESENCE UNSPECIFIED, SUBSEQUENT ENCOUNTER: Primary | ICD-10-CM

## 2018-10-28 PROCEDURE — 99283 EMERGENCY DEPT VISIT LOW MDM: CPT

## 2018-10-28 PROCEDURE — 74011250637 HC RX REV CODE- 250/637: Performed by: PHYSICIAN ASSISTANT

## 2018-10-28 RX ORDER — HYDROCODONE BITARTRATE AND ACETAMINOPHEN 5; 325 MG/1; MG/1
1 TABLET ORAL
Qty: 6 TAB | Refills: 0 | Status: SHIPPED | OUTPATIENT
Start: 2018-10-28 | End: 2019-04-01 | Stop reason: CLARIF

## 2018-10-28 RX ORDER — HYDROCODONE BITARTRATE AND ACETAMINOPHEN 5; 325 MG/1; MG/1
1 TABLET ORAL
Status: DISCONTINUED | OUTPATIENT
Start: 2018-10-28 | End: 2018-10-28 | Stop reason: HOSPADM

## 2018-10-28 RX ADMIN — BACITRACIN ZINC, NEOMYCIN SULFATE, POLYMYXIN B SULFATE 1 PACKET: 3.5; 5000; 4 OINTMENT TOPICAL at 10:11

## 2018-10-28 NOTE — ED PROVIDER NOTES
EMERGENCY DEPARTMENT HISTORY AND PHYSICAL EXAM 
 
 
Date: 10/28/2018 Patient Name: Leticia Ocasio History of Presenting Illness Chief Complaint Patient presents with  
 Hand Pain History Provided By: Patient HPI: Leticia Ocasio, 26 y.o. female with no significant PMHx presents ambulatory to the ED with cc of left hand pain. Patient states that yesterday while she was preparing dinner she accidentally cut her left hand with a knife. She went to [facility] yesterday and had 2 sutures placed. She presents today due to continued pain. Patient states that this morning when she woke up, the pain acutely worsened in her left hand, and has been unrelieved by medications at home. She states that the ED sent her home with 2 Percocets, but she has since run out. She denies any new injury, discharge or bleeding from the site. She notes that the pain is worse when she has her hand at her side. Chief Complaint: left hand pain Duration: 1 Days Timing:  Acute and Worsening Location: left hand Quality: Aching Severity: 10 out of 10 Modifying Factors: no relief with home medications Associated Symptoms: denies any other associated signs or symptoms There are no other complaints, changes, or physical findings at this time. PCP: Jackie Negrete NP Current Outpatient Medications Medication Sig Dispense Refill  neomycin-bacitracin-polymyxin (NEOSPORIN, [id number],) 3.5mg-400 unit- 5,000 unit/gram ointment Apply  to affected area two (2) times a day for 7 days. 1 Tube 0  
 HYDROcodone-acetaminophen (NORCO) 5-325 mg per tablet Take 1 Tab by mouth every six (6) hours as needed for Pain. Max Daily Amount: 4 Tabs. 6 Tab 0  
 metFORMIN (GLUCOPHAGE) 1,000 mg tablet Take 1,000 mg by mouth two (2) times daily (with meals). Past History Past Medical History: 
Past Medical History:  
Diagnosis Date  Abnormal Pap smear   
 abnormal pap at 15 y/o normal since  Back pain 2015  
 [facility]  emergency [facility]  Diabetes ([address]  Diabetes mellitus Gestational Diabetes with last two pregnancies-diet controlled Past Surgical History: 
Past Surgical History:  
Procedure Laterality Date  HX  SECTION    
 HX CHOLECYSTECTOMY  HX OTHER SURGICAL    
 cholecystectomy 2002 Family History: 
Family History Problem Relation Age of Onset  Diabetes Father  Hypertension Father  Stroke Father  Kidney Disease Father Social History: 
Social History Tobacco Use  Smoking status: Never Smoker  Smokeless tobacco: Never Used Substance Use Topics  Alcohol use: Yes Comment: Socially  Drug use: No  
 
 
Allergies: Allergies Allergen Reactions  Amoxicillin Rash  Tramadol Hives Review of Systems Review of Systems Constitutional: Negative for chills and fever. HENT: Negative for sore throat. Eyes: Negative for pain. Respiratory: Negative for cough and shortness of breath. Cardiovascular: Negative for chest pain. Gastrointestinal: Negative for abdominal pain, diarrhea, nausea and vomiting. Genitourinary: Negative for dysuria and hematuria. Musculoskeletal: Positive for arthralgias (left hand). Skin: Positive for wound. Negative for rash. Neurological: Negative for dizziness, light-headedness, numbness and headaches. Psychiatric/Behavioral: Negative for behavioral problems and confusion. Physical Exam  
Physical Exam  
Constitutional: She is oriented to person, place, and time. She appears well-developed and well-nourished. No distress. Anxious appearing HENT:  
Head: Normocephalic and atraumatic. Right Ear: External ear normal.  
Left Ear: External ear normal.  
Nose: Nose normal.  
Eyes: Conjunctivae and EOM are normal.  
Neck: Normal range of motion. Neck supple. Cardiovascular: Normal rate, regular rhythm and normal heart sounds. No murmur heard. Pulmonary/Chest: Effort normal and breath sounds normal. She has no decreased breath sounds. She has no wheezes. Abdominal: Soft. Bowel sounds are normal. She exhibits no distension. There is no tenderness. There is no guarding. Musculoskeletal: Normal range of motion. She exhibits no edema or tenderness. Hands: 
Neurological: She is alert and oriented to person, place, and time. Skin: Skin is warm and dry. No rash noted. She is not diaphoretic. Psychiatric: Her behavior is normal. Judgment normal. Her mood appears anxious. Nursing note and vitals reviewed. Diagnostic Study Results Labs - No results found for this or any previous visit (from the past 12 hour(s)). Radiologic Studies - Medical Decision Making I am the first provider for this patient. I reviewed the vital signs, available nursing notes, past medical history, past surgical history, family history and social history. Vital Signs-Reviewed the patient's vital signs. Patient Vitals for the past 12 hrs: 
 Temp Pulse Resp BP SpO2  
10/28/18 0905 98.4 °F (36.9 °C) (!) 101 16 145/81 100 % Records Reviewed: Nursing Notes and Old Medical Records Provider Notes (Medical Decision Making): DDx: laceration, hematoma, malingering. Patient presents with persistent left hand pain s/p laceration and repair yesterday. Exam benign and wound appears to be healing well with small hematoma. Will dc with short course of pain medication and encourage good wound care at home. ED Course:  
Initial assessment performed. The patients presenting problems have been discussed, and they are in agreement with the care plan formulated and outlined with them. I have encouraged them to ask questions as they arise throughout their visit. Disposition: 
DISCHARGE NOTE: 
10:30 AM 
The care plan has been outline with the patient and/or family, who verbally conveyed understanding and agreement.  Available results have been reviewed. Patient and/or family understand the follow up plan as outlined and discharge instructions. Should their condition deterioration at any time after discharge the patient agrees to return, follow up sooner than outlined or seek medical assistance at the closest Emergency Room as soon as possible. Questions have been answered. Discharge instructions and educational information regarding the patient's diagnosis as well a list of reasons why the patient would want to seek immediate medical attention, should their condition change, were reviewed directly with the patient/family PLAN: 
1. Discharge home 2. Medications as directed 3. Schedule f/u with PCP 4. Return precautions reviewed Discharge Medication List as of 10/28/2018 10:23 AM  
  
START taking these medications Details  
neomycin-bacitracin-polymyxin (NEOSPORIN, [id number],) 3.5mg-400 unit- 5,000 unit/gram ointment Apply  to affected area two (2) times a day for 7 days. , Normal, Disp-1 Tube, R-0  
  
HYDROcodone-acetaminophen (NORCO) 5-325 mg per tablet Take 1 Tab by mouth every six (6) hours as needed for Pain. Max Daily Amount: 4 Tabs., Print, Disp-6 Tab, R-0  
  
  
CONTINUE these medications which have NOT CHANGED Details  
metFORMIN (GLUCOPHAGE) 1,000 mg tablet Take 1,000 mg by mouth two (2) times daily (with meals). , Historical Med  
  
  
 
 
5.  
Follow-up Information Follow up With Specialties Details Why Contact Janel Smith NP Nurse Practitioner   [address] [phone] [facility] EMERGENCY DEPT Emergency Medicine  As needed, If symptoms worsen [address] [place] [address] [address] [facility] 
[phone] Return to ED if worse Diagnosis Clinical Impression: 1. Laceration of left hand, foreign body presence unspecified, subsequent encounter This note will not be viewable in [address]

## 2018-10-28 NOTE — ED TRIAGE NOTES
Pt. Laceration to L hand re-wrapped at this time.   Pt. Refusing pain medication at this time as she drove to the hospital.

## 2018-10-28 NOTE — DISCHARGE INSTRUCTIONS
Thank you!     Thank you for allowing us to provide you with excellent care today. We hope we addressed all of your concerns and needs. We strive to provide excellent quality care in the Emergency Department. You will receive a survey after your visit to evaluate the care you were provided.      Please rate us a level 5 (excellent), as anything less than excellent does not meet our goals.      If you feel that you have not received excellent quality care or timely care, please ask to speak to the nurse manager. Please choose us in the future for your continued health care needs. ______________________________________________________________________    The exam and treatment you received in the Emergency Department were for an urgent problem and are not intended as complete care. It is important that you follow-up with a doctor, nurse practitioner, or physician assistant to:  (1) confirm your diagnosis,  (2) re-evaluation of changes in your illness and treatment, and  (3) for ongoing care. If your symptoms become worse or you do not improve as expected and you are unable to reach your usual health care provider, you should return to the Emergency Department. We are available 24 hours a day. Take this sheet with you when you go to your follow-up visit. If you have any problem arranging the follow-up visit, contact [address] [place] [address] 21 [phone])    Make an appointment with your Primary Care doctor for follow up of this visit. Return to the ER if you are unable to be seen in the time recommended on your discharge instructions. Cuts: Care Instructions  Your Care Instructions  A cut can happen anywhere on your body. Stitches, staples, skin adhesives, or pieces of tape called Steri-Strips are sometimes used to keep the edges of a cut together and help it heal. Steri-Strips can be used by themselves or with stitches or staples. Sometimes cuts are left open.   If the cut went deep and through the skin, the doctor may have closed the cut in two layers. A deeper layer of stitches brings the deep part of the cut together. These stitches will dissolve and don't need to be removed. The upper layer closure, which could be stitches, staples, Steri-Strips, or adhesive, is what you see on the cut. A cut is often covered by a bandage. The doctor has checked you carefully, but problems can develop later. If you notice any problems or new symptoms, get medical treatment right away. Follow-up care is a key part of your treatment and safety. Be sure to make and go to all appointments, and call your doctor if you are having problems. It's also a good idea to know your test results and keep a list of the medicines you take. How can you care for yourself at home? If a cut is open or closed  · Prop up the sore area on a pillow anytime you sit or lie down during the next 3 days. Try to keep it above the level of your heart. This will help reduce swelling. · Keep the cut dry for the first 24 to 48 hours. After this, you can shower if your doctor okays it. Pat the cut dry. · Don't soak the cut, such as in a bathtub. Your doctor will tell you when it's safe to get the cut wet. · After the first 24 to 48 hours, clean the cut with soap and water 2 times a day unless your doctor gives you different instructions. ? Don't use hydrogen peroxide or alcohol, which can slow healing. ? You may cover the cut with a thin layer of petroleum jelly and a nonstick bandage. ? If the doctor put a bandage over the cut, put on a new bandage after cleaning the cut or if the bandage gets wet or dirty. · Avoid any activity that could cause your cut to reopen. · Be safe with medicines. Read and follow all instructions on the label. ? If the doctor gave you a prescription medicine for pain, take it as prescribed. ? If you are not taking a prescription pain medicine, ask your doctor if you can take an over-the-counter medicine.   If the cut is closed with stitches, staples, or Steri-Strips  · Follow the above instructions for open or closed cuts. · Do not remove the stitches or staples on your own. Your doctor will tell you when to come back to have the stitches or staples removed. · Leave Steri-Strips on until they fall off. If the cut is closed with a skin adhesive  · Follow the above instructions for open or closed cuts. · Leave the skin adhesive on your skin until it falls off on its own. This may take 5 to 10 days. · Do not scratch, rub, or pick at the adhesive. · Do not put the sticky part of a bandage directly on the adhesive. · Do not put any kind of ointment, cream, or lotion over the area. This can make the adhesive fall off too soon. Do not use hydrogen peroxide or alcohol, which can slow healing. When should you call for help? Call your doctor now or seek immediate medical care if:    · You have new pain, or your pain gets worse.     · The skin near the cut is cold or pale or changes color.     · You have tingling, weakness, or numbness near the cut.     · The cut starts to bleed, and blood soaks through the bandage. Oozing small amounts of blood is normal.     · You have trouble moving the area near the cut.     · You have symptoms of infection, such as:  ? Increased pain, swelling, warmth, or redness around the cut.  ? Red streaks leading from the cut.  ? Pus draining from the cut.  ? A fever.    Watch closely for changes in your health, and be sure to contact your doctor if:    · The cut reopens.     · You do not get better as expected. Where can you learn more? Go to http://asael-sloan.info/. Enter M735 in the search box to learn more about \"Cuts: Care Instructions. \"  Current as of: November 20, 2017  Content Version: 11.8  © 9894-5523 [employer]. Care instructions adapted under license by [employer] (which disclaims liability or warranty for this information).  If you have questions about a medical condition or this instruction, always ask your healthcare professional. [place] [address] any warranty or liability for your use of this information.

## 2019-01-30 ENCOUNTER — APPOINTMENT (OUTPATIENT)
Dept: GENERAL RADIOLOGY | Age: 34
End: 2019-01-30
Attending: PHYSICIAN ASSISTANT
Payer: SELF-PAY

## 2019-01-30 ENCOUNTER — HOSPITAL ENCOUNTER (EMERGENCY)
Age: 34
Discharge: HOME | End: 2019-01-30
Attending: EMERGENCY MEDICINE
Payer: SELF-PAY

## 2019-01-30 VITALS
TEMPERATURE: 98 F | WEIGHT: 210 LBS | SYSTOLIC BLOOD PRESSURE: 136 MMHG | OXYGEN SATURATION: 100 % | HEIGHT: 65 IN | RESPIRATION RATE: 16 BRPM | HEART RATE: 85 BPM | BODY MASS INDEX: 34.99 KG/M2 | DIASTOLIC BLOOD PRESSURE: 87 MMHG

## 2019-01-30 DIAGNOSIS — M25.552 LEFT HIP PAIN: ICD-10-CM
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
## 2019-01-30 LAB
APPEARANCE UR: ABNORMAL
BACTERIA URNS QL MICRO: NEGATIVE /HPF
BILIRUB UR QL: NEGATIVE
COLOR UR: ABNORMAL
EPITH CASTS URNS QL MICRO: ABNORMAL /LPF
GLUCOSE UR STRIP.AUTO-MCNC: >1000 MG/DL
HGB UR QL STRIP: ABNORMAL
KETONES UR QL STRIP.AUTO: ABNORMAL MG/DL
LEUKOCYTE ESTERASE UR QL STRIP.AUTO: ABNORMAL
NITRITE UR QL STRIP.AUTO: NEGATIVE
PH UR STRIP: 5.5 [PH] (ref 5–8)
PROT UR STRIP-MCNC: 30 MG/DL
RBC #/AREA URNS HPF: >100 /HPF (ref 0–5)
SP GR UR REFRACTOMETRY: >1.03 (ref 1–1.03)
UR CULT HOLD, URHOLD: NORMAL
UROBILINOGEN UR QL STRIP.AUTO: 0.2 EU/DL (ref 0.2–1)
WBC URNS QL MICRO: ABNORMAL /HPF (ref 0–4)

## 2019-01-30 PROCEDURE — 74011250637 HC RX REV CODE- 250/637: Performed by: PHYSICIAN ASSISTANT

## 2019-01-30 PROCEDURE — 81001 URINALYSIS AUTO W/SCOPE: CPT

## 2019-01-30 PROCEDURE — 71046 X-RAY EXAM CHEST 2 VIEWS: CPT

## 2019-01-30 PROCEDURE — 73502 X-RAY EXAM HIP UNI 2-3 VIEWS: CPT

## 2019-01-30 PROCEDURE — 99283 EMERGENCY DEPT VISIT LOW MDM: CPT

## 2019-01-30 RX ORDER — ACETAMINOPHEN 325 MG/1
650 TABLET ORAL
Status: COMPLETED | OUTPATIENT
Start: 2019-01-30 | End: 2019-01-30

## 2019-01-30 RX ADMIN — ACETAMINOPHEN 650 MG: 325 TABLET ORAL at 17:26

## 2019-01-30 NOTE — LETTER
[address] 
[address] [facility] [address] [id number] 
[phone] Work/School Note Date: 1/30/2019 To Whom It May concern: 
 
Carolina Noonan was seen and treated today in the emergency room by the following provider(s): 
Physician Assistant: Jordan Del Valle PA-C. Carolina Noonan may return to work on 02/1/19.  
 
Sincerely, 
 
 
 
 
Ariella Jimenes PA-C

## 2019-01-30 NOTE — DISCHARGE INSTRUCTIONS
We hope that we have addressed all of your medical concerns. The examination and treatment you received in the Emergency Department were for an emergent problem and were not intended as complete care. It is important that you follow up with your healthcare provider(s) for ongoing care. If your symptoms worsen or do not improve as expected, and you are unable to reach your usual health care provider(s), you should return to the Emergency Department. Today's healthcare is undergoing tremendous change, and patient satisfaction surveys are one of the many tools to assess the quality of medical care. You may receive a survey from the [employer] organization regarding your experience in the Emergency Department. I hope that your experience has been completely positive, particularly the medical care that I provided. As such, please participate in the survey; anything less than excellent does not meet my expectations or intentions. Replaced by [facility] and [facility] participate in nationally recognized quality of care measures. If your blood pressure is greater than 120/80, as reported below, we urge that you seek medical care to address the potential of high blood pressure, commonly known as hypertension. Hypertension can be hereditary or can be caused by certain medical conditions, pain, stress, or \"white coat syndrome. \"       Please make an appointment with your health care provider(s) for follow up of your Emergency Department visit. VITALS:   Patient Vitals for the past 8 hrs:   Temp Pulse Resp BP SpO2   01/30/19 1651 98.1 °F (36.7 °C) 96 16 (!) 163/96 100 %   01/30/19 1536 -- (!) 108 -- -- 100 %          Thank you for allowing us to provide you with medical care today. We realize that you have many choices for your emergency care needs. Please choose us in the future for any continued health care needs. Sreekanth Callaway [facility].   Office: [phone]            Recent Results (from the past 24 hour(s))   URINALYSIS W/MICROSCOPIC    Collection Time: 01/30/19  5:26 PM   Result Value Ref Range    Color PENDING     Appearance CLOUDY (A) CLEAR      Specific gravity PENDING     pH (UA) 5.5 5.0 - 8.0      Protein 30 (A) NEG mg/dL    Glucose >1,000 (A) NEG mg/dL    Ketone TRACE (A) NEG mg/dL    Bilirubin NEGATIVE  NEG      Blood LARGE (A) NEG      Urobilinogen 0.2 0.2 - 1.0 EU/dL    Nitrites NEGATIVE  NEG      Leukocyte Esterase SMALL (A) NEG      WBC PENDING /hpf    RBC PENDING /hpf    Epithelial cells PENDING /lpf    Bacteria PENDING /hpf   URINE CULTURE HOLD SAMPLE    Collection Time: 01/30/19  5:26 PM   Result Value Ref Range    Urine culture hold        URINE ON HOLD IN MICROBIOLOGY DEPT FOR 3 DAYS. IF UNPRESERVED URINE IS SUBMITTED, IT CANNOT BE USED FOR ADDITIONAL TESTING AFTER 24 HRS, RECOLLECTION WILL BE REQUIRED. Xr Chest Pa Lat    Result Date: 1/30/2019  INDICATION:  left rib pain Exam: Chest 2 views. Comparison: 7/23/2017. Findings: Cardiomediastinal silhouette is normal. Pulmonary vasculature is not engorged. No focal parenchymal opacities, effusions, or pneumothorax. Bony thorax is intact. Impression: 1. No acute cardiopulmonary disease     Xr Hip Lt W Or Wo Pelv 2-3 Vws    Result Date: 1/30/2019  EXAM: XR HIP LT W OR WO PELV 2-3 VWS INDICATION: left hip pain. COMPARISON: None. FINDINGS: An AP view of the pelvis and a frogleg lateral view of the left hip demonstrate no fracture, dislocation or other acute abnormality. No significant joint space loss. Chronic calcification at the right greater trochanter unchanged. IMPRESSION: No acute abnormality.     ;I

## 2019-01-30 NOTE — ED TRIAGE NOTES
Pt presents after having a GLF at work after slipping on ice. Pt states her left hip landed on the curb and the left side of her ribs/torso. Pt denies hitting head or LOC

## 2019-02-01 NOTE — ED PROVIDER NOTES
26 y.o. female with past medical history significant for DM and back pain presents with complaints of low back and left hip pain after mechanical glf earlier today. The pt states \"I was walking to work when I slipped on some water and landed on my left side. The pt denies hitting her head or LOC. She was able to get up and walk after the fall. The pt states that bearing weight makes the pain worse. The pt rates the pain as a 6/10 in severity. The pt describes the pain as a dull ache. The pt denies taking anything at home for the discomfort. There are no other acute medical complaints at this time. PCP: NGHIA Fox PA-C Past Medical History:  
Diagnosis Date  Abnormal Pap smear   
 abnormal pap at 13 y/o normal since  Back pain 2015  
 [facility]  emergency [facility]  Diabetes ([facility] [address]  Diabetes mellitus Gestational Diabetes with last two pregnancies-diet controlled Past Surgical History:  
Procedure Laterality Date  HX  SECTION    
 HX CHOLECYSTECTOMY  HX OTHER SURGICAL    
 cholecystectomy  Family History:  
Problem Relation Age of Onset  Diabetes Father  Hypertension Father  Stroke Father  Kidney Disease Father Social History Socioeconomic History  Marital status: SINGLE Spouse name: Not on file  Number of children: Not on file  Years of education: Not on file  Highest education level: Not on file Social Needs  Financial resource strain: Not on file  Food insecurity - worry: Not on file  Food insecurity - inability: Not on file  Transportation needs - medical: Not on file  Transportation needs - non-medical: Not on file Occupational History  Not on file Tobacco Use  Smoking status: Never Smoker  Smokeless tobacco: Never Used Substance and Sexual Activity  Alcohol use: Yes Comment: Socially  Drug use: No  
 Sexual activity: Yes  
 Birth control/protection: None Other Topics Concern  Not on file Social History Narrative  Not on file ALLERGIES: Amoxicillin and Tramadol Review of Systems Constitutional: Negative for chills, diaphoresis and fever. HENT: Negative for congestion, postnasal drip, rhinorrhea and sore throat. Eyes: Negative for photophobia, discharge, redness and visual disturbance. Respiratory: Negative for cough, chest tightness, shortness of breath and wheezing. Cardiovascular: Negative for chest pain, palpitations and leg swelling. Gastrointestinal: Negative for abdominal distention, abdominal pain, blood in stool, constipation, diarrhea, nausea and vomiting. Genitourinary: Negative for difficulty urinating, dysuria, frequency, hematuria and urgency. Musculoskeletal: Positive for arthralgias and myalgias. Negative for back pain and joint swelling. Skin: Negative for color change and rash. Neurological: Negative for dizziness, speech difficulty, weakness, light-headedness, numbness and headaches. Psychiatric/Behavioral: Negative for confusion. The patient is not nervous/anxious. All other systems reviewed and are negative. Vitals:  
 01/30/19 1536 01/30/19 1651 01/30/19 1828 BP:  (!) 163/96 136/87 Pulse: (!) 108 96 85 Resp:  16 16 Temp:  98.1 °F (36.7 °C) 98 °F (36.7 °C) SpO2: 100% 100% 100% Weight:  95.3 kg (210 lb) Height:  5' 5\" (1.651 m) Physical Exam  
Constitutional: She is oriented to person, place, and time. She appears well-developed and well-nourished. No distress. HENT:  
Head: Normocephalic and atraumatic. Head is without raccoon's eyes, without Corcoran's sign and without laceration. Right Ear: Hearing, tympanic membrane, external ear and ear canal normal. No foreign bodies. Tympanic membrane is not bulging. No hemotympanum.   
Left Ear: Hearing, tympanic membrane, external ear and ear canal normal. No foreign bodies. Tympanic membrane is not bulging. No hemotympanum. Nose: Nose normal. No mucosal edema or rhinorrhea. Right sinus exhibits no maxillary sinus tenderness and no frontal sinus tenderness. Left sinus exhibits no maxillary sinus tenderness and no frontal sinus tenderness. Mouth/Throat: Uvula is midline, oropharynx is clear and moist and mucous membranes are normal. No tonsillar abscesses. Eyes: Conjunctivae and EOM are normal. Pupils are equal, round, and reactive to light. Right eye exhibits no discharge. Left eye exhibits no discharge. Neck: Normal range of motion. Neck supple. Cardiovascular: Normal rate, regular rhythm and normal heart sounds. Exam reveals no gallop and no friction rub. No murmur heard. Regular rate and rhythm. No murmurs, gallops, rubs, or clicks. Pulmonary/Chest: Effort normal and breath sounds normal. No respiratory distress. She has no wheezes. She has no rales. No stridor or wheezes. No accessory muscle usage. No nasal flaring. Breath Sounds equal bilaterally. Abdominal: Soft. Bowel sounds are normal. She exhibits no distension. There is no tenderness. There is no rebound and no guarding. No abdominal Bruits. No pulsatile mass. No abdominal scars. Active bowel sounds. Musculoskeletal: Normal range of motion. She exhibits no edema, tenderness or deformity. No C, T, L, S spine tenderness. Pt has full mobility of upper and lower extremities. Pt is able to ambulate without difficulty. No perineal numbness. Pt is NVI. Neurological: She is alert and oriented to person, place, and time. Skin: She is not diaphoretic. Nursing note and vitals reviewed. MDM Number of Diagnoses or Management Options Fall, initial encounter:  
Left hip pain:  
Diagnosis management comments: Pt afebrile and nontoxic appearing. No midline tenderness or palpable deformities.   Will treat symptomatically for lumbar strain and advised close follow up with family doctor for further evaluation of symptoms. Luz Maria Man PA-C Procedures

## 2019-04-01 ENCOUNTER — APPOINTMENT (OUTPATIENT)
Dept: GENERAL RADIOLOGY | Age: 34
End: 2019-04-01
Attending: EMERGENCY MEDICINE
Payer: SELF-PAY

## 2019-04-01 ENCOUNTER — HOSPITAL ENCOUNTER (EMERGENCY)
Age: 34
Discharge: HOME | End: 2019-04-01
Attending: EMERGENCY MEDICINE
Payer: SELF-PAY

## 2019-04-01 VITALS
HEIGHT: 65 IN | TEMPERATURE: 98.3 F | WEIGHT: 205.91 LBS | BODY MASS INDEX: 34.31 KG/M2 | RESPIRATION RATE: 18 BRPM | HEART RATE: 89 BPM | DIASTOLIC BLOOD PRESSURE: 92 MMHG | SYSTOLIC BLOOD PRESSURE: 152 MMHG | OXYGEN SATURATION: 100 %

## 2019-04-01 DIAGNOSIS — S20.212A CONTUSION OF RIB ON LEFT SIDE, INITIAL ENCOUNTER: Primary | ICD-10-CM

## 2019-04-01 LAB — HCG UR QL: NEGATIVE

## 2019-04-01 PROCEDURE — 81025 URINE PREGNANCY TEST: CPT

## 2019-04-01 PROCEDURE — 99282 EMERGENCY DEPT VISIT SF MDM: CPT

## 2019-04-01 PROCEDURE — 71101 X-RAY EXAM UNILAT RIBS/CHEST: CPT

## 2019-04-01 RX ORDER — HYDROCODONE BITARTRATE AND ACETAMINOPHEN 5; 325 MG/1; MG/1
1 TABLET ORAL
Qty: 10 TAB | Refills: 0 | Status: SHIPPED | OUTPATIENT
Start: 2019-04-01 | End: 2019-04-04

## 2019-04-01 RX ORDER — IBUPROFEN 600 MG/1
600 TABLET ORAL
Qty: 20 TAB | Refills: 0 | Status: SHIPPED | OUTPATIENT
Start: 2019-04-01 | End: 2019-05-25

## 2019-04-01 NOTE — DISCHARGE INSTRUCTIONS
Patient Education        Bruised Rib: Care Instructions  Overview    You can get a bruised rib if you fall or get hit, such as in an accident or while playing sports. The medical term for a bruise is \"contusion. \" Small blood vessels get torn and leak blood under the skin. Most people think of a bruise as a black-and-blue area. But bones and muscles can also get bruised. An injury may damage the rib but not cause a bruise that you can see. Sometimes it can be hard to tell if a rib is bruised or broken. The symptoms may be the same. And a broken bone can't always be seen on an X-ray. But the treatment for a bruised rib is often the same as treatment for a broken one. An injury to the ribs can cause pain. The pain may be worse when you breathe deeply, cough, or sneeze. In most cases, a bruised rib will heal on its own. You can take pain medicine while the rib mends. Pain relief allows you to take deep breaths. Follow-up care is a key part of your treatment and safety. Be sure to make and go to all appointments, and call your doctor if you are having problems. It's also a good idea to know your test results and keep a list of the medicines you take. How can you care for yourself at home? · Rest and protect the injured or sore area. Stop, change, or take a break from any activity that causes pain. · Put ice or a cold pack on the area for 10 to 20 minutes at a time. Put a thin cloth between the ice and your skin. · After 2 or 3 days, if your swelling is gone, put a heating pad set on low or a warm cloth on your chest. Some doctors suggest that you go back and forth between hot and cold. Put a thin cloth between the heating pad and your skin. · Ask your doctor if you can take an over-the-counter pain medicine, such as acetaminophen (Tylenol), ibuprofen (Advil, Motrin), or naproxen (Aleve). Be safe with medicines. Read and follow all instructions on the label.   · As your pain gets better, slowly return to your normal activities. Be patient. Rib bruises can take weeks or months to heal. If the pain gets worse, it may be a sign that you need to rest a while longer. When should you call for help? [id number] anytime you think you may need emergency care. For example, call if:    · You have severe trouble breathing.     Call your doctor now or seek immediate medical care if:    · You have trouble breathing.     · You have a fever.     · You have a new or worse cough.     · You have new or worse pain.    Watch closely for changes in your health, and be sure to contact your doctor if:    · You do not get better as expected. Where can you learn more? Go to http://asael-sloan.info/. Enter R125 in the search box to learn more about \"Bruised Rib: Care Instructions. \"  Current as of: September 23, 2018  Content Version: 11.9  © 7024-9119 [employer]. Care instructions adapted under license by [employer] (which disclaims liability or warranty for this information). If you have questions about a medical condition or this instruction, always ask your healthcare professional. [address] any warranty or liability for your use of this information.
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
## 2019-04-01 NOTE — ED NOTES
Pt discharged by ANDREW Gaitan. Pt provided with discharge instructions Rx and instructions on follow up care. Pt out of ED in stable condition accompanied by self.
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
## 2019-04-01 NOTE — LETTER
[address] 
[facility] EMERGENCY DEPT 
[address] [place] [address] [facility] [address] [id number] [phone] Work/School Note Date: 4/1/2019 To Whom It May concern: 
 
Evangelista Ordonez was seen and treated today in the emergency room by the following provider(s): 
Attending Provider: Babs Nuñez MD 
Physician Assistant: [employer], PA. Please excuse her from work April 2 and 3.  
 
Sincerely, 
 
 
 
 
ANDREW Sahu

## 2019-04-27 ENCOUNTER — HOSPITAL ENCOUNTER (EMERGENCY)
Age: 34
Discharge: HOME | End: 2019-04-27
Attending: EMERGENCY MEDICINE
Payer: SELF-PAY

## 2019-04-27 VITALS
DIASTOLIC BLOOD PRESSURE: 90 MMHG | BODY MASS INDEX: 34.12 KG/M2 | OXYGEN SATURATION: 100 % | TEMPERATURE: 98.5 F | SYSTOLIC BLOOD PRESSURE: 151 MMHG | HEART RATE: 98 BPM | RESPIRATION RATE: 16 BRPM | WEIGHT: 204.81 LBS | HEIGHT: 65 IN

## 2019-04-27 DIAGNOSIS — T21.22XD PARTIAL THICKNESS BURN OF ABDOMINAL WALL, SUBSEQUENT ENCOUNTER: Primary | ICD-10-CM

## 2019-04-27 PROCEDURE — 74011250637 HC RX REV CODE- 250/637: Performed by: EMERGENCY MEDICINE

## 2019-04-27 PROCEDURE — 99283 EMERGENCY DEPT VISIT LOW MDM: CPT

## 2019-04-27 PROCEDURE — 77030037878 HC DRSG MEPILEX >48IN BORD MOLN -B

## 2019-04-27 RX ORDER — OXYCODONE AND ACETAMINOPHEN 5; 325 MG/1; MG/1
1 TABLET ORAL
Status: COMPLETED | OUTPATIENT
Start: 2019-04-27 | End: 2019-04-27

## 2019-04-27 RX ORDER — OXYCODONE AND ACETAMINOPHEN 5; 325 MG/1; MG/1
1 TABLET ORAL
Qty: 10 TAB | Refills: 0 | Status: SHIPPED | OUTPATIENT
Start: 2019-04-27 | End: 2019-04-30

## 2019-04-27 RX ADMIN — OXYCODONE AND ACETAMINOPHEN 1 TABLET: 5; 325 TABLET ORAL at 08:42
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
## 2019-04-27 NOTE — ED NOTES
Dr. Francesca Mckinney reviewed discharge instructions with the patient. The patient verbalized understanding. All questions and concerns were addressed. The patient declined a wheelchair and is discharged ambulatory in the care of family members with instructions and prescriptions in hand. Pt is alert and oriented x 4. Respirations are clear and unlabored.

## 2019-04-27 NOTE — DISCHARGE INSTRUCTIONS
Patient Education        Julio: Care Instructions  Your Care Instructions    Julio--even minor ones--can be very painful. A minor burn may heal within several days, while a more serious burn may take weeks or even months to heal completely. You may notice that the burned area feels tight and hard while it is healing. It is important to continue to move the area as the burn heals to prevent loss of motion or loss of function in the area. When your skin is damaged by a burn, you have a greater risk of infection. Keep the wound clean and change the bandages regularly to prevent infection and help the burn heal.  Burns can leave permanent scars. Taking good care of the burn as it heals may help prevent bad scars. The doctor has checked you carefully, but problems can develop later. If you notice any problems or new symptoms, get medical treatment right away. Follow-up care is a key part of your treatment and safety. Be sure to make and go to all appointments, and call your doctor if you are having problems. It's also a good idea to know your test results and keep a list of the medicines you take. How can you care for yourself at home? · If your doctor told you how to care for your burn, follow your doctor's instructions. If you did not get instructions, follow this general advice:  ? Wash the burn with clean water 2 times a day. Don't use hydrogen peroxide or alcohol, which can slow healing. ? Gently pat the burn dry after you wash it.  ? You may cover the burn with a thin layer of petroleum jelly, such as Vaseline, and a nonstick bandage. ? Apply more petroleum jelly and replace the bandage as needed. · Protect your burn while it is healing. Cover your burn if you are going out in the cold or the sun. ? Wear long sleeves if the burn is on your hands or arms. ? Wear a hat if the burn is on your face. ? Wear socks and shoes if the burn is on your feet. · Do not break blisters open.  This increases the chance of infection. If a blister breaks open by itself, blot up the liquid, and leave the skin that covered the blister. This helps protect the new skin. · If your doctor prescribed antibiotics, take them as directed. Do not stop taking them just because you feel better. You need to take the full course of antibiotics. For pain and itching  · Take pain medicines exactly as directed. ? If the doctor gave you a prescription medicine for pain, take it as prescribed. ? If you are not taking a prescription pain medicine, ask your doctor if you can take an over-the-counter medicine. · If the burn itches, try not to scratch it. Try an over-the-counter antihistamine such as diphenhydramine (Benadryl) or loratadine (Claritin). Read and follow all instructions on the label. When should you call for help? Call your doctor now or seek immediate medical care if:    · Your pain gets worse.     · You have symptoms of infection, such as:  ? Increased pain, swelling, warmth, or redness near the burn. ? Red streaks leading from the burn. ? Pus draining from the burn. ? A fever.    Watch closely for changes in your health, and be sure to contact your doctor if:    · You do not get better as expected. Where can you learn more? Go to http://asael-sloan.info/. Enter Q530 in the search box to learn more about \"Burns: Care Instructions. \"  Current as of: September 23, 2018  Content Version: 11.9  © 5790-2898 [employer]. Care instructions adapted under license by [employer] (which disclaims liability or warranty for this information). If you have questions about a medical condition or this instruction, always ask your healthcare professional. [place] [address] any warranty or liability for your use of this information.

## 2019-05-25 ENCOUNTER — APPOINTMENT (OUTPATIENT)
Dept: GENERAL RADIOLOGY | Age: 34
End: 2019-05-25
Attending: PHYSICIAN ASSISTANT
Payer: SELF-PAY

## 2019-05-25 ENCOUNTER — HOSPITAL ENCOUNTER (EMERGENCY)
Age: 34
Discharge: HOME | End: 2019-05-25
Attending: EMERGENCY MEDICINE
Payer: SELF-PAY

## 2019-05-25 VITALS
HEIGHT: 65 IN | SYSTOLIC BLOOD PRESSURE: 142 MMHG | HEART RATE: 60 BPM | TEMPERATURE: 98.4 F | DIASTOLIC BLOOD PRESSURE: 97 MMHG | WEIGHT: 210 LBS | RESPIRATION RATE: 16 BRPM | OXYGEN SATURATION: 100 % | BODY MASS INDEX: 34.99 KG/M2

## 2019-05-25 DIAGNOSIS — S86.912A STRAIN OF LEFT KNEE, INITIAL ENCOUNTER: Primary | ICD-10-CM
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
## 2019-05-25 PROCEDURE — 73562 X-RAY EXAM OF KNEE 3: CPT

## 2019-05-25 PROCEDURE — 99282 EMERGENCY DEPT VISIT SF MDM: CPT

## 2019-05-25 RX ORDER — IBUPROFEN 600 MG/1
600 TABLET ORAL
Qty: 20 TAB | Refills: 0 | Status: SHIPPED | OUTPATIENT
Start: 2019-05-25 | End: 2019-06-22

## 2019-05-25 NOTE — DISCHARGE INSTRUCTIONS

## 2019-05-25 NOTE — ED PROVIDER NOTES
EMERGENCY DEPARTMENT HISTORY AND PHYSICAL EXAM      Date: 2019  Patient Name: Best Ca    History of Presenting Illness     Chief Complaint   Patient presents with    Knee Pain     left knee pain after twisting it in a hole        History Provided By: Patient    HPI: Best Ca, 26 y.o. female presents to the ED with cc of left knee pain after a GLF that occurred just prior to arrival in the ED today. 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 The patient has not been able to ambulate since falling. The pain is a constant pain to the left knee that is non-radiating. She denies numbness/tingling. She took tylenol with only minimal relief prior to arrival in the ED. She denies prior injuries to the left knee. There are no other complaints, changes, or physical findings at this time. Social Hx: Tobacco (denies), EtOH (denies), Illicit drug use (denies)     PCP: Litzy Membreno NP    No current facility-administered medications on file prior to encounter. Current Outpatient Medications on File Prior to Encounter   Medication Sig Dispense Refill    metFORMIN (GLUCOPHAGE) 1,000 mg tablet Take 1,000 mg by mouth two (2) times daily (with meals).          Past History     Past Medical History:  Past Medical History:   Diagnosis Date    Abnormal Pap smear     abnormal pap at 15 y/o normal since     Back pain 2015    [facility]     Diabetes mellitus     Gestational Diabetes with last two pregnancies-diet controlled       Past Surgical History:  Past Surgical History:   Procedure Laterality Date    HX  SECTION      HX CHOLECYSTECTOMY      HX OTHER SURGICAL      cholecystectomy        Family History:  Family History   Problem Relation Age of Onset    Diabetes Father     Hypertension Father  Stroke Father     Kidney Disease Father        Social History:  Social History     Tobacco Use    Smoking status: Never Smoker    Smokeless tobacco: Never Used   Substance Use Topics    Alcohol use: Yes     Comment: Socially    Drug use: No       Allergies: Allergies   Allergen Reactions    Amoxicillin Rash    Tramadol Hives         Review of Systems   Review of Systems   Constitutional: Negative for chills, diaphoresis and fever. HENT: Negative for congestion, ear pain, rhinorrhea and sore throat. Respiratory: Negative for cough and shortness of breath. Cardiovascular: Negative for chest pain. Gastrointestinal: Negative for abdominal pain, constipation, diarrhea, nausea and vomiting. Genitourinary: Negative for difficulty urinating, dysuria, frequency and hematuria. Musculoskeletal: Positive for arthralgias and gait problem. Negative for back pain and myalgias. Neurological: Negative for headaches. All other systems reviewed and are negative. Physical Exam   Physical Exam   Constitutional: She is oriented to person, place, and time. She appears well-developed and well-nourished. No distress. 26 y.o. -American female    HENT:   Head: Normocephalic and atraumatic. Eyes: Conjunctivae are normal. Right eye exhibits no discharge. Left eye exhibits no discharge. Neck: Normal range of motion. Neck supple. Cardiovascular: Normal rate, regular rhythm and normal heart sounds. No murmur heard. Pulmonary/Chest: Effort normal and breath sounds normal. No respiratory distress. Musculoskeletal:   L KNEE: No swelling, ecchymosis or deformity. TTP to the anterior knee. FROM with pain. No crepitus or laxity. Distal NV intact. Cap refill < 2 sec. Neurological: She is alert and oriented to person, place, and time. Skin: Skin is warm and dry. She is not diaphoretic. Psychiatric: She has a normal mood and affect.  Her behavior is normal.   Nursing note and vitals reviewed. Diagnostic Study Results     Labs - None    Radiologic Studies -   XR KNEE LT 3 V   Final Result   No acute abnormality. Medical Decision Making   I am the first provider for this patient. I reviewed the vital signs, available nursing notes, past medical history, past surgical history, family history and social history. Vital Signs-Reviewed the patient's vital signs. Patient Vitals for the past 12 hrs:   Temp Pulse Resp BP SpO2   05/25/19 1515 98.4 °F (36.9 °C) 60 16 (!) 142/97 100 %       Records Reviewed: Nursing Notes    Provider Notes (Medical Decision Making):   Fracture, sprain, strain, contusion, meniscal injury    ED Course:   Initial assessment performed. The patients presenting problems have been discussed, and they are in agreement with the care plan formulated and outlined with them. I have encouraged them to ask questions as they arise throughout their visit. Critical Care Time: None    Disposition:  DISCHARGE NOTE:  4:20 PM  The pt is ready for discharge. The pt's signs, symptoms, diagnosis, and discharge instructions have been discussed and pt has conveyed their understanding. The pt is to follow up as recommended or return to ER should their symptoms worsen. Plan has been discussed and pt is in agreement. PLAN:  1. Current Discharge Medication List      CONTINUE these medications which have CHANGED    Details   ibuprofen (MOTRIN) 600 mg tablet Take 1 Tab by mouth every six (6) hours as needed for Pain. Qty: 20 Tab, Refills: 0         CONTINUE these medications which have NOT CHANGED    Details   metFORMIN (GLUCOPHAGE) 1,000 mg tablet Take 1,000 mg by mouth two (2) times daily (with meals).            2.   Follow-up Information     Follow up With Specialties Details Why Contact Janel Mendosa NP Nurse Practitioner In 1 week As needed [address] [phone]          Return to ED if worse     Diagnosis     Clinical Impression:   1. Strain of left knee, initial encounter    2. Fall, initial encounter          Please note that this dictation was completed with Jarvam, the computer voice recognition software. Quite often unanticipated grammatical, syntax, homophones, and other interpretive errors are inadvertently transcribed by the computer software. Please disregards these errors. Please excuse any errors that have escaped final proofreading. This note will not be viewable in [address]

## 2019-05-25 NOTE — ED NOTES
Patient presents to the ED with complaint of left knee injury post fall around 1430 while at work - Patient states that she tripped in the yard and her left foot went into a hole and she twisted her knee - Patient has limited movement, pain and slight knot noted below left knee

## 2019-06-22 ENCOUNTER — HOSPITAL ENCOUNTER (EMERGENCY)
Age: 34
Discharge: HOME | End: 2019-06-22
Attending: EMERGENCY MEDICINE
Payer: SELF-PAY

## 2019-06-22 VITALS
TEMPERATURE: 99 F | OXYGEN SATURATION: 100 % | DIASTOLIC BLOOD PRESSURE: 93 MMHG | WEIGHT: 209 LBS | SYSTOLIC BLOOD PRESSURE: 158 MMHG | BODY MASS INDEX: 34.82 KG/M2 | HEART RATE: 104 BPM | HEIGHT: 65 IN | RESPIRATION RATE: 18 BRPM

## 2019-06-22 DIAGNOSIS — K08.9 POOR DENTITION: ICD-10-CM

## 2019-06-22 DIAGNOSIS — K08.89 PAIN, DENTAL: Primary | ICD-10-CM

## 2019-06-22 DIAGNOSIS — R03.0 ELEVATED BLOOD PRESSURE READING: ICD-10-CM

## 2019-06-22 DIAGNOSIS — K04.7 DENTAL INFECTION: ICD-10-CM

## 2019-06-22 PROCEDURE — 74011250637 HC RX REV CODE- 250/637: Performed by: PHYSICIAN ASSISTANT

## 2019-06-22 PROCEDURE — 74011250636 HC RX REV CODE- 250/636: Performed by: PHYSICIAN ASSISTANT

## 2019-06-22 PROCEDURE — 99283 EMERGENCY DEPT VISIT LOW MDM: CPT

## 2019-06-22 PROCEDURE — 96372 THER/PROPH/DIAG INJ SC/IM: CPT

## 2019-06-22 PROCEDURE — 74011000250 HC RX REV CODE- 250: Performed by: PHYSICIAN ASSISTANT

## 2019-06-22 RX ORDER — OXYCODONE AND ACETAMINOPHEN 5; 325 MG/1; MG/1
1 TABLET ORAL
Status: COMPLETED | OUTPATIENT
Start: 2019-06-22 | End: 2019-06-22

## 2019-06-22 RX ORDER — IBUPROFEN 800 MG/1
800 TABLET ORAL
Qty: 20 TAB | Refills: 0 | Status: SHIPPED | OUTPATIENT
Start: 2019-06-22 | End: 2019-06-22

## 2019-06-22 RX ORDER — CLINDAMYCIN HYDROCHLORIDE 300 MG/1
300 CAPSULE ORAL 4 TIMES DAILY
Qty: 28 CAP | Refills: 0 | Status: SHIPPED | OUTPATIENT
Start: 2019-06-22 | End: 2019-06-29

## 2019-06-22 RX ORDER — IBUPROFEN 800 MG/1
800 TABLET ORAL
Qty: 20 TAB | Refills: 0 | Status: SHIPPED | OUTPATIENT
Start: 2019-06-22 | End: 2019-06-29

## 2019-06-22 RX ORDER — HYDROCODONE BITARTRATE AND ACETAMINOPHEN 5; 325 MG/1; MG/1
1 TABLET ORAL
Qty: 10 TAB | Refills: 0 | Status: SHIPPED | OUTPATIENT
Start: 2019-06-22 | End: 2019-06-25

## 2019-06-22 RX ORDER — KETOROLAC TROMETHAMINE 30 MG/ML
30 INJECTION, SOLUTION INTRAMUSCULAR; INTRAVENOUS
Status: COMPLETED | OUTPATIENT
Start: 2019-06-22 | End: 2019-06-22

## 2019-06-22 RX ADMIN — LIDOCAINE HYDROCHLORIDE: 20 SOLUTION ORAL; TOPICAL at 22:07

## 2019-06-22 RX ADMIN — KETOROLAC TROMETHAMINE 30 MG: 30 INJECTION, SOLUTION INTRAMUSCULAR at 22:07

## 2019-06-22 RX ADMIN — OXYCODONE AND ACETAMINOPHEN 1 TABLET: 5; 325 TABLET ORAL at 23:00

## 2019-06-22 NOTE — LETTER
[address] 
[facility] EMERGENCY DEPT 
[address] [place] [address] [address] [phone] 
[phone] Work/School Note Date: 6/22/2019 To Whom It May concern: 
 
Lynett Shone was seen and treated today in the emergency room by the following provider(s): 
Attending Provider: Milton Gutierrez MD 
Physician Assistant: Brady Salmeron. Lynett Shone may return to work on June 24, 2019 Sincerely, 
 
 
 
 
ANDREW Pittman

## 2019-06-23 NOTE — ED NOTES
Patient discharged by RORY CORTEZ. Patient ambulated with steady gait in NAD accompanied by family on departure.

## 2019-06-23 NOTE — DISCHARGE INSTRUCTIONS
Patient Education        Elevated Blood Pressure: Care Instructions  Your Care Instructions    Blood pressure is a measure of how hard the blood pushes against the walls of your arteries. It's normal for blood pressure to go up and down throughout the day. But if it stays up over time, you have high blood pressure. Two numbers tell you your blood pressure. The first number is the systolic pressure. It shows how hard the blood pushes when your heart is pumping. The second number is the diastolic pressure. It shows how hard the blood pushes between heartbeats, when your heart is relaxed and filling with blood. An ideal blood pressure in adults is less than 120/80 (say \"120 over 80\"). High blood pressure is 140/90 or higher. You have high blood pressure if your top number is 140 or higher or your bottom number is 90 or higher, or both. The main test for high blood pressure is simple, fast, and painless. To diagnose high blood pressure, your doctor will test your blood pressure at different times. After testing your blood pressure, your doctor may ask you to test it again when you are home. If you are diagnosed with high blood pressure, you can work with your doctor to make a long-term plan to manage it. Follow-up care is a key part of your treatment and safety. Be sure to make and go to all appointments, and call your doctor if you are having problems. It's also a good idea to know your test results and keep a list of the medicines you take. How can you care for yourself at home? · Do not smoke. Smoking increases your risk for heart attack and stroke. If you need help quitting, talk to your doctor about stop-smoking programs and medicines. These can increase your chances of quitting for good. · Stay at a healthy weight. · Try to limit how much sodium you eat to less than 2,300 milligrams (mg) a day. Your doctor may ask you to try to eat less than 1,500 mg a day. · Be physically active.  Get at least 30 minutes of exercise on most days of the week. Walking is a good choice. You also may want to do other activities, such as running, swimming, cycling, or playing tennis or team sports. · Avoid or limit alcohol. Talk to your doctor about whether you can drink any alcohol. · Eat plenty of fruits, vegetables, and low-fat dairy products. Eat less saturated and total fats. · Learn how to check your blood pressure at home. When should you call for help? Call your doctor now or seek immediate medical care if:  ? · Your blood pressure is much higher than normal (such as 180/110 or higher). ? · You think high blood pressure is causing symptoms such as:  ¨ Severe headache. ¨ Blurry vision. ? Watch closely for changes in your health, and be sure to contact your doctor if:  ? · You do not get better as expected. Where can you learn more? Go to http://asael-sloan.info/. Enter N720 in the search box to learn more about \"Elevated Blood Pressure: Care Instructions. \"  Current as of: September 21, 2016  Content Version: 11.4  © 6677-8131 [employer]. Care instructions adapted under license by [employer] (which disclaims liability or warranty for this information). If you have questions about a medical condition or this instruction, always ask your healthcare professional. [place] [address] any warranty or liability for your use of this information. Patient Education     Dental Pain: After Your Visit  Your Care Instructions  The most common cause of dental pain is tooth decay. It can also be caused by an infection of the tooth (abscess) or gum, a tooth that has not broken all the way through the gum (impacted tooth), or a problem with the nerve-filled center of the tooth. Follow-up care is a key part of your treatment and safety. Be sure to make and go to all appointments, and call your doctor if you are having problems.  Its also a good idea to know your test results and keep a list of the medicines you take. How can you care for yourself at home? · Contact a dentist for follow-up care. · Put ice or a cold pack on the outside of your mouth for 10 to 20 minutes at a time to reduce pain and swelling. Put a thin cloth between the ice and your skin. · Take an over-the-counter pain medicine, such as acetaminophen (Tylenol), ibuprofen (Advil, Motrin), or naproxen (Aleve). Read and follow all instructions on the label. · Do not take two or more pain medicines at the same time unless the doctor told you to. Many pain medicines have acetaminophen, which is Tylenol. Too much acetaminophen (Tylenol) can be harmful. · Rinse your mouth with warm salt water every 2 hours to help relieve pain and swelling from an infected tooth. Mix 1 teaspoon of salt in 8 ounces of water. · If your doctor prescribed antibiotics, take them as directed. Do not stop taking them just because you feel better. You need to take the full course of antibiotics. When should you call for help? Call your doctor now or seek immediate medical care if:  · You have signs of infection, such as:  ¨ Increased pain, swelling, warmth, or redness. ¨ Pus draining from the gum, tooth, or face. ¨ A fever. Watch closely for changes in your health, and be sure to contact your doctor if:  · You do not get better as expected. Where can you learn more? Go to Keep Your [facility].be  Enter L364 in the search box to learn more about \"Dental Pain: After Your Visit. \"   © 7867-7860 [employer]. Care instructions adapted under license by [employer] (which disclaims liability or warranty for this information). This care instruction is for use with your licensed healthcare professional. If you have questions about a medical condition or this instruction, always ask your healthcare professional. [address] any warranty or liability for your use of this information.   Content Version: 13.2.388898; Last Revised: May 17, 2013                 Patient Education        Abscessed Tooth: Care Instructions  Your Care Instructions    An abscessed tooth is a tooth that has a pocket of pus in the tissues around it. Pus forms when the body tries to fight an infection caused by bacteria. If the pus cannot drain, it forms an abscess. An abscessed tooth can cause red, swollen gums and throbbing pain, especially when you chew. You may have a bad taste in your mouth and a fever, and your jaw may swell. Damage to the tooth, untreated tooth decay, or gum disease can cause an abscessed tooth. An abscessed tooth needs to be treated by a dental professional right away. If it is not treated, the infection could spread to other parts of your body. Your dentist will give you antibiotics to stop the infection. He or she may make a hole in the tooth or cut open (anshul) the abscess inside your mouth so that the infection can drain, which should relieve your pain. You may need to have a root canal treatment, which tries to save your tooth by taking out the infected pulp and replacing it with a healing medicine and/or a filling. If these treatments do not work, your tooth may have to be removed. Follow-up care is a key part of your treatment and safety. Be sure to make and go to all appointments, and call your doctor if you are having problems. It's also a good idea to know your test results and keep a list of the medicines you take. How can you care for yourself at home? · Reduce pain and swelling in your face and jaw by putting ice or a cold pack on the outside of your cheek for 10 to 20 minutes at a time. Put a thin cloth between the ice and your skin. · Take pain medicines exactly as directed. ? If the doctor gave you a prescription medicine for pain, take it as prescribed. ? If you are not taking a prescription pain medicine, ask your doctor if you can take an over-the-counter medicine.   · Take your antibiotics as directed. Do not stop taking them just because you feel better. You need to take the full course of antibiotics. To prevent tooth abscess  · Brush and floss every day, and have regular dental checkups. · Eat a healthy diet, and avoid sugary foods and drinks. · Do not smoke, use e-cigarettes with nicotine, or use spit tobacco. Tobacco and nicotine slow your ability to heal. Tobacco also increases your risk for gum disease and cancer of the mouth and throat. If you need help quitting, talk to your doctor about stop-smoking programs and medicines. These can increase your chances of quitting for good. When should you call for help? Call 911 anytime you think you may need emergency care. For example, call if:    · You have trouble breathing.    Call your doctor now or seek immediate medical care if:    · You have new or worse symptoms of infection, such as:  ? Increased pain, swelling, warmth, or redness. ? Red streaks leading from the area. ? Pus draining from the area. ? A fever.    Watch closely for changes in your health, and be sure to contact your doctor if:    · You do not get better as expected. Where can you learn more? Go to http://asael-sloan.info/. Enter L227 in the search box to learn more about \"Abscessed Tooth: Care Instructions. \"  Current as of: March 27, 2018  Content Version: 11.9  © 8442-2875 [employer]. Care instructions adapted under license by [employer] (which disclaims liability or warranty for this information). If you have questions about a medical condition or this instruction, always ask your healthcare professional. [place] [address] any warranty or liability for your use of this information.

## 2019-06-23 NOTE — ED PROVIDER NOTES
EMERGENCY DEPARTMENT HISTORY AND PHYSICAL EXAM      Date: 2019  Patient Name: Mike Rucker    History of Presenting Illness     HPI: Mike Rucker is a 26 y.o. female with PMHx of diabetes, poor dentition, presents the emergency room for dental pain that started yesterday. She says that she fractured a tooth in her upper left molar area yesterday when eating food. She says that her pain is progressively worsened to a 10 out of 10, constant, throbbing, nonradiating pain. She says she has taken ibuprofen and Tylenol at home without relief. She denies fever/chills, nausea vomiting, sore throat, trismus, among other associated symptoms. Pertinent social history: none    Pertinent surgical history: none    PCP: Chao Gonzalez NP    Current Outpatient Medications   Medication Sig Dispense Refill    HYDROcodone-acetaminophen (NORCO) 5-325 mg per tablet Take 1 Tab by mouth every four (4) hours as needed for Pain for up to 3 days. Max Daily Amount: 6 Tabs. 10 Tab 0    ibuprofen (MOTRIN) 800 mg tablet Take 1 Tab by mouth every six (6) hours as needed for Pain for up to 7 days. 20 Tab 0    clindamycin (CLEOCIN) 300 mg capsule Take 1 Cap by mouth four (4) times daily for 7 days. 28 Cap 0    metFORMIN (GLUCOPHAGE) 1,000 mg tablet Take 1,000 mg by mouth two (2) times daily (with meals).          Past History     Past Medical History:  Past Medical History:   Diagnosis Date    Abnormal Pap smear     abnormal pap at 15 y/o normal since     Back pain 2015    [facility]  emergency [facility])     Diabetes mellitus     Gestational Diabetes with last two pregnancies-diet controlled       Past Surgical History:  Past Surgical History:   Procedure Laterality Date    HX  SECTION      HX CHOLECYSTECTOMY      HX OTHER SURGICAL      cholecystectomy        Family History:  Family History   Problem Relation Age of Onset    Diabetes Father     Hypertension Father     Stroke Father     Kidney Disease Father        Social History:  Social History     Tobacco Use    Smoking status: Never Smoker    Smokeless tobacco: Never Used   Substance Use Topics    Alcohol use: Yes     Comment: Socially    Drug use: No       Allergies: Allergies   Allergen Reactions    Amoxicillin Rash    Tramadol Hives         Review of Systems   Review of Systems   Constitutional: Negative for chills and fever. HENT: Positive for dental problem. Negative for congestion, ear pain and sore throat. Respiratory: Negative for cough and shortness of breath. Cardiovascular: Negative for chest pain and leg swelling. Gastrointestinal: Negative for abdominal pain, diarrhea, nausea and vomiting. Endocrine: Negative for polydipsia, polyphagia and polyuria. Genitourinary: Negative for frequency and urgency. Denies pregnancy   Musculoskeletal: Negative for arthralgias, back pain and neck pain. Skin: Negative for rash and wound. Neurological: Negative for light-headedness and headaches. Physical Exam     Vitals:    06/22/19 2012   BP: (!) 158/93   Pulse: (!) 104   Resp: 18   Temp: 99 °F (37.2 °C)   SpO2: 100%   Weight: 94.8 kg (208 lb 15.9 oz)   Height: 5' 5\" (1.651 m)     Physical Exam   Constitutional: She is oriented to person, place, and time. She appears well-developed and well-nourished. No distress. HENT:   Head: Normocephalic and atraumatic. Mouth/Throat: Uvula is midline and oropharynx is clear and moist. Abnormal dentition. Dental abscesses and dental caries present. No uvula swelling. No oropharyngeal exudate or posterior oropharyngeal edema. Cardiovascular: Normal rate, regular rhythm and normal heart sounds. Pulmonary/Chest: Effort normal and breath sounds normal.   Neurological: She is alert and oriented to person, place, and time. Skin: Skin is warm and dry. She is not diaphoretic. Psychiatric: She has a normal mood and affect.  Her behavior is normal. Judgment and thought content normal.   Nursing note and vitals reviewed. Diagnostic Study Results     Labs -   No results found for this or any previous visit (from the past 12 hour(s)). Radiologic Studies -   No orders to display     CT Results  (Last 48 hours)    None        CXR Results  (Last 48 hours)    None            Medical Decision Making   I am the first provider for this patient. I reviewed the vital signs, available nursing notes, past medical history, past surgical history, social history    Pulse Oximetry Analysis - 100% on RA    ED Course:   Initial assessment performed. The patients presenting problems have been discussed, and they are in agreement with the care plan formulated and outlined with them. I have encouraged them to ask questions as they arise throughout their visit. Vital Signs-Reviewed the patient's vital signs. Vitals:    06/22/19 2012   BP: (!) 158/93   BP 1 Location: Left arm   BP Patient Position: At rest   Pulse: (!) 104   Resp: 18   Temp: 99 °F (37.2 °C)   SpO2: 100%   Weight: 94.8 kg (208 lb 15.9 oz)   Height: 5' 5\" (1.651 m)       Medications Administered During ED Course  Medications   dental ball (lidocaine/Benadryl/Cetacaine) mixture ( Mucous Membrane Given 6/22/19 2207)   ketorolac (TORADOL) injection 30 mg (30 mg IntraMUSCular Given 6/22/19 2207)   oxyCODONE-acetaminophen (PERCOCET) 5-325 mg per tablet 1 Tab (1 Tab Oral Given 6/22/19 2300)       Progress Note:  On re evaluation pt is resting comfortably, is requesting discharge, and has no new complaints, changes, or physical findings. Disposition:  D/c home    DISCHARGE NOTE:   I Counseled the patient on diagnosis and care plan. All available lab and imaging results have been reviewed by me and were discussed with the patient, including all incidental findings. The likelihood of other entities in the differential is insufficient to justify any further testing for them. This was explained to the patient.   Patient agrees with plan and agrees to follow up with PCP as recommended, or return to the ED if their symptoms worsen. All medications were reviewed with the patient. All of pt's questions and concerns were addressed. The patient was advised that new or worsening symptoms would require further evaluation and should prompt immediate return to the Emergency Department. Discharge instructions have been provided and explained to the patient, along with reasons to return to the ED. Patient voices understanding and is agreeable with the plan for discharge. Patient is ready to go home. Follow-up Information     Follow up With Specialties Details Why Contact Info    Cholo Morris NP Nurse Practitioner Schedule an appointment as soon as possible for a visit For elevated blood pressure reading [address]  Schedule an appointment as soon as possible for a visit For dental infection and dental pain [address]  [phone]          Discharge Medication List as of 6/22/2019 11:27 PM      START taking these medications    Details   HYDROcodone-acetaminophen (NORCO) 5-325 mg per tablet Take 1 Tab by mouth every four (4) hours as needed for Pain for up to 3 days. Max Daily Amount: 6 Tabs., Print, Disp-10 Tab, R-0      clindamycin (CLEOCIN) 300 mg capsule Take 1 Cap by mouth four (4) times daily for 7 days. , Normal, Disp-28 Cap, R-0         CONTINUE these medications which have CHANGED    Details   ibuprofen (MOTRIN) 800 mg tablet Take 1 Tab by mouth every six (6) hours as needed for Pain for up to 7 days. , Normal, Disp-20 Tab, R-0         CONTINUE these medications which have NOT CHANGED    Details   metFORMIN (GLUCOPHAGE) 1,000 mg tablet Take 1,000 mg by mouth two (2) times daily (with meals). , Historical Med             Provider Notes (Medical Decision Making):   DDx: Pulpitis, dental abscess, dental caries    Procedures:  Procedures    Critical Care Time: none      Diagnosis     Clinical Impression:   1. Pain, dental    2. Elevated blood pressure reading    3. Dental infection    4. Poor dentition        Please note that this dictation was completed with [employer], the computer voice recognition software. Quite often unanticipated grammatical, syntax, homophones, and other interpretive errors are inadvertently transcribed by the computer software. Please disregard these errors. Please excuse any errors that have escaped final proofreading.

## 2019-06-25 ENCOUNTER — HOSPITAL ENCOUNTER (EMERGENCY)
Age: 34
Discharge: HOME | End: 2019-06-25
Attending: EMERGENCY MEDICINE
Payer: SELF-PAY

## 2019-06-25 VITALS
RESPIRATION RATE: 17 BRPM | SYSTOLIC BLOOD PRESSURE: 157 MMHG | DIASTOLIC BLOOD PRESSURE: 96 MMHG | WEIGHT: 205.25 LBS | TEMPERATURE: 98.1 F | BODY MASS INDEX: 34.2 KG/M2 | OXYGEN SATURATION: 99 % | HEART RATE: 100 BPM | HEIGHT: 65 IN

## 2019-06-25 DIAGNOSIS — R03.0 ELEVATED BLOOD PRESSURE READING: ICD-10-CM

## 2019-06-25 DIAGNOSIS — G50.1 ATYPICAL FACE PAIN: Primary | ICD-10-CM

## 2019-06-25 DIAGNOSIS — K08.89 DENTALGIA: ICD-10-CM

## 2019-06-25 PROCEDURE — 99282 EMERGENCY DEPT VISIT SF MDM: CPT

## 2019-06-25 RX ORDER — HYDROCODONE BITARTRATE AND ACETAMINOPHEN 5; 325 MG/1; MG/1
1 TABLET ORAL
Qty: 12 TAB | Refills: 0 | Status: SHIPPED | OUTPATIENT
Start: 2019-06-25 | End: 2019-06-30

## 2019-06-25 RX ORDER — DIPHENHYDRAMINE HYDROCHLORIDE 50 MG/ML
INJECTION, SOLUTION INTRAMUSCULAR; INTRAVENOUS
Status: DISCONTINUED
Start: 2019-06-25 | End: 2019-06-25 | Stop reason: WASHOUT

## 2019-06-25 NOTE — ED TRIAGE NOTES
Pt presents with dental pain 9/10. Pt seen here in ED last week for pain. Pt states that she can see dentist on Thursday but pain is \"unbearable\". Pt denies n/v. Pt reports mild diarheaa. Pt AOX4. Pt covers mouth with paper towel.

## 2019-06-25 NOTE — LETTER
[address] 
[facility] EMERGENCY DEPT 
[address] [place] [address] [address] [id number] [phone] Work/School Note Date: 6/25/2019 To Whom It May concern: 
 
Toro eVgas was seen and treated today in the emergency room. She may return to work in 1 to 2 days, as symptoms improve. Sincerely, Brady Hayes

## 2019-06-26 NOTE — ED PROVIDER NOTES
EMERGENCY DEPARTMENT HISTORY AND PHYSICAL EXAM      Date: 6/25/2019  Patient Name: Ashley Yee    History of Presenting Illness     Chief Complaint   Patient presents with    Dental Pain     Pt presents to ED c/o dental pain. Pt was here last Saturday and seen/treated. Pt states eraiest f/u is Th[facility] for dentist/       History Provided By: Patient    HPI: Ashley Yee, 26 y.o. female presents ambulatory to the Emergency Dept with c/o L upper dental pain x 1 week. Pt states she was seen on 6/22/19 and placed on antibiotics and given analgesia; however, she has run out of her pain medication and continues to have severe pain. She states she has a dentist appt scheduled for 6/27/19 but requests dental balls and analgesia to manage discomfort until she can be seen. She denied fever/chills. No difficulty swallowing/breathing. Pt denied drainage or bleeding from gum. She denied use of tobacco.      Chief Complaint: L upper dental pain  Duration: 1 Weeks  Timing:  Acute  Location: L upper dentition  Quality: Aching and Dull  Severity: Severe  Modifying Factors: increased pain with exposure to air, eating  Associated Symptoms: denies any other associated signs or symptoms        There are no other complaints, changes, or physical findings at this time. PCP: Melissa Regalado NP    Current Facility-Administered Medications   Medication Dose Route Frequency Provider Last Rate Last Dose    dental ball (lidocaine/Benadryl/Cetacaine) mixture   Mucous Membrane ONCE Kye Jian [place], [place]         Current Outpatient Medications   Medication Sig Dispense Refill    HYDROcodone-acetaminophen (LORTAB 5-325) 5-325 mg per tablet Take 1 Tab by mouth every eight (8) hours as needed for Pain for up to 5 days. Max Daily Amount: 3 Tabs. 12 Tab 0    ibuprofen (MOTRIN) 800 mg tablet Take 1 Tab by mouth every six (6) hours as needed for Pain for up to 7 days.  20 Tab 0    clindamycin (CLEOCIN) 300 mg capsule Take 1 Cap by mouth four (4) times daily for 7 days. 28 Cap 0    metFORMIN (GLUCOPHAGE) 1,000 mg tablet Take 1,000 mg by mouth two (2) times daily (with meals). Past History     Past Medical History:  Past Medical History:   Diagnosis Date    Abnormal Pap smear     abnormal pap at 13 y/o normal since     Back pain 2015    [facility]  emergency [facility]    Diabetes [facility])     Diabetes mellitus     Gestational Diabetes with last two pregnancies-diet controlled       Past Surgical History:  Past Surgical History:   Procedure Laterality Date    HX  SECTION      HX CHOLECYSTECTOMY      HX OTHER SURGICAL      cholecystectomy        Family History:  Family History   Problem Relation Age of Onset    Diabetes Father     Hypertension Father     Stroke Father     Kidney Disease Father        Social History:  Social History     Tobacco Use    Smoking status: Never Smoker    Smokeless tobacco: Never Used   Substance Use Topics    Alcohol use: Yes     Comment: Socially    Drug use: No       Allergies: Allergies   Allergen Reactions    Amoxicillin Rash    Tramadol Hives         Review of Systems   Review of Systems   Constitutional: Negative for chills and fever. HENT: Positive for dental problem. Negative for congestion, drooling, rhinorrhea and sore throat. Respiratory: Negative for cough and shortness of breath. Cardiovascular: Negative for chest pain and palpitations. Gastrointestinal: Negative for diarrhea, nausea and vomiting. Musculoskeletal: Negative for neck pain and neck stiffness. Skin: Negative for rash and wound. Allergic/Immunologic: Negative for food allergies and immunocompromised state. Neurological: Negative for dizziness and headaches. Hematological: Negative for adenopathy. Does not bruise/bleed easily. Psychiatric/Behavioral: Negative for agitation and confusion. Physical Exam   Physical Exam   Constitutional: She is oriented to person, place, and time.  She appears well-developed and well-nourished. She appears distressed. WDWN AA female, alert, in marked discomfort   HENT:   Head: Normocephalic and atraumatic. Nose: Nose normal.   Mouth/Throat: No oropharyngeal exudate. Pt with poor overall dental health, multiple dental caries, decayed/missing teeth. Tender to L upper incisor region which is missing, no gingival erythema, edema, exudate, or bleeding noted. Eyes: Conjunctivae and EOM are normal. Right eye exhibits no discharge. Left eye exhibits no discharge. No scleral icterus. Neck: Normal range of motion. Neck supple. No JVD present. No tracheal deviation present. No thyromegaly present. Cardiovascular: Normal rate, regular rhythm and normal heart sounds. Pulmonary/Chest: Effort normal and breath sounds normal. No respiratory distress. She has no wheezes. Musculoskeletal: Normal range of motion. She exhibits no edema. Lymphadenopathy:     She has no cervical adenopathy. Neurological: She is alert and oriented to person, place, and time. She exhibits normal muscle tone. Coordination normal.   Skin: Skin is warm and dry. She is not diaphoretic. No erythema. Psychiatric: She has a normal mood and affect. Her behavior is normal. Judgment normal.   Nursing note and vitals reviewed. Diagnostic Study Results     Labs -   No results found for this or any previous visit (from the past 12 hour(s)). Radiologic Studies -   No orders to display       Medical Decision Making   I am the first provider for this patient. I reviewed the vital signs, available nursing notes, past medical history, past surgical history, family history and social history. Vital Signs-Reviewed the patient's vital signs.   Patient Vitals for the past 12 hrs:   Temp Pulse Resp BP SpO2   06/25/19 2005     99 %   06/25/19 1919 98.1 °F (36.7 °C) 100 17 (!) 157/96 99 %           Records Reviewed: Nursing Notes, Old Medical Records and Previous Laboratory Studies    Provider Notes (Medical Decision Making):   peridontal dz, dental abscess, gingivitis    ED Course:   Initial assessment performed. The patients presenting problems have been discussed, and they are in agreement with the care plan formulated and outlined with them. I have encouraged them to ask questions as they arise throughout their visit. HTN COUNSELING  Reviewed the risks of uncontrolled HTN to include stroke, heart attack, renal failure, aneurysm and death. They will take their medications daily and follow up with their PCP for recheck. DISCHARGE NOTE:  8:23 PM  The care plan has been outline with the patient and/or family, who verbally conveyed understanding and agreement. Available results have been reviewed. Patient and/or family understand the follow up plan as outlined and discharge instructions. Should their condition deterioration at any time after discharge the patient agrees to return, follow up sooner than outlined or seek medical assistance at the closest Emergency Room as soon as possible. Questions have been answered. Discharge instructions and educational information regarding the patient's diagnosis as well a list of reasons why the patient would want to seek immediate medical attention, should their condition change, were reviewed directly with the patient/family     PLAN:  1. Current Discharge Medication List      START taking these medications    Details   HYDROcodone-acetaminophen (LORTAB 5-325) 5-325 mg per tablet Take 1 Tab by mouth every eight (8) hours as needed for Pain for up to 5 days. Max Daily Amount: 3 Tabs. Qty: 12 Tab, Refills: 0    Associated Diagnoses: Kourtney Allyn these medications which have NOT CHANGED    Details   clindamycin (CLEOCIN) 300 mg capsule Take 1 Cap by mouth four (4) times daily for 7 days. Qty: 28 Cap, Refills: 0           2.    Follow-up Information     Follow up With Specialties Details Why Contact Info    Your dentist        [facility] EMERGENCY DEPT Emergency Medicine  If symptoms worsen [address] [place] [address]  [phone]        Return to ED if worse     Diagnosis     Clinical Impression:   1. Atypical face pain    2.  Avel Campoverde

## 2019-07-23 ENCOUNTER — APPOINTMENT (OUTPATIENT)
Dept: GENERAL RADIOLOGY | Age: 34
End: 2019-07-23
Attending: PHYSICIAN ASSISTANT
Payer: SELF-PAY

## 2019-07-23 ENCOUNTER — HOSPITAL ENCOUNTER (EMERGENCY)
Age: 34
Discharge: HOME | End: 2019-07-23
Attending: EMERGENCY MEDICINE
Payer: SELF-PAY

## 2019-07-23 VITALS
OXYGEN SATURATION: 100 % | RESPIRATION RATE: 16 BRPM | WEIGHT: 212.96 LBS | TEMPERATURE: 98.4 F | BODY MASS INDEX: 35.48 KG/M2 | HEART RATE: 90 BPM | HEIGHT: 65 IN | SYSTOLIC BLOOD PRESSURE: 152 MMHG | DIASTOLIC BLOOD PRESSURE: 89 MMHG

## 2019-07-23 DIAGNOSIS — M77.8 RIGHT SHOULDER TENDINITIS: Primary | ICD-10-CM

## 2019-07-23 LAB — HCG UR QL: NEGATIVE

## 2019-07-23 PROCEDURE — A4565 SLINGS: HCPCS

## 2019-07-23 PROCEDURE — 96372 THER/PROPH/DIAG INJ SC/IM: CPT

## 2019-07-23 PROCEDURE — 74011250636 HC RX REV CODE- 250/636: Performed by: PHYSICIAN ASSISTANT

## 2019-07-23 PROCEDURE — 73030 X-RAY EXAM OF SHOULDER: CPT

## 2019-07-23 PROCEDURE — 99283 EMERGENCY DEPT VISIT LOW MDM: CPT

## 2019-07-23 PROCEDURE — 81025 URINE PREGNANCY TEST: CPT

## 2019-07-23 PROCEDURE — 72050 X-RAY EXAM NECK SPINE 4/5VWS: CPT

## 2019-07-23 RX ORDER — NAPROXEN 500 MG/1
500 TABLET ORAL 2 TIMES DAILY WITH MEALS
Qty: 30 TAB | Refills: 0 | Status: SHIPPED | OUTPATIENT
Start: 2019-07-23 | End: 2019-08-08

## 2019-07-23 RX ORDER — KETOROLAC TROMETHAMINE 30 MG/ML
30 INJECTION, SOLUTION INTRAMUSCULAR; INTRAVENOUS
Status: COMPLETED | OUTPATIENT
Start: 2019-07-23 | End: 2019-07-23

## 2019-07-23 RX ADMIN — KETOROLAC TROMETHAMINE 30 MG: 30 INJECTION, SOLUTION INTRAMUSCULAR at 17:52

## 2019-07-23 NOTE — ED NOTES
Jessica Vazquez DO   has done a bedside review of the discharge instructions. The patient is in understanding. The patients line(s) are removed. The patient is dressed, and belongings together for discharge.

## 2019-07-23 NOTE — ED PROVIDER NOTES
EMERGENCY DEPARTMENT HISTORY AND PHYSICAL EXAM      Date: 7/23/2019  Patient Name: Hero Mcallister    History of Presenting Illness     Chief Complaint   Patient presents with   [facility] Fall     pt. reports falling while doing cartwheel on Saturday. c/o right shoulder and right-sided neck pain       History Provided By: Patient    HPI: Hero Mcallister, 37 y.o. female R hand dominant with PMHx significant for diabetes II and chronic back pain with degenerative disc disease presenting to the emergency department today for a 2-day history of right shoulder pain. 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 She fell and landed on the right shoulder. She's been having worsening R shoulder pain since. She is reporting sharp, stabbing right anterior shoulder pain with radiation and she denies any numbness or tingling in the hand. She denies any weakness in the hand. She has been using ice and heat at home without any improvement. She has been taking Tylenol and Motrin at home without any relief. She reports decreased range of motion and even has difficulty brushing her hair. 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 She denies any heavy lifting. She has no chest pain, shortness of breath, abdominal pain, nausea, vomiting, diarrhea, or dysuria. PCP: Teressa Elam NP    There are no other complaints, changes, or physical findings at this time. Current Outpatient Medications   Medication Sig Dispense Refill    metFORMIN (GLUCOPHAGE) 1,000 mg tablet Take 1,000 mg by mouth two (2) times daily (with meals).          Past History     Past Medical History:  Past Medical History:   Diagnosis Date    Abnormal Pap smear     abnormal pap at 15 y/o normal since     Back pain 2015 feb    [facility]  emergency [facility]    Diabetes [facility])     Diabetes mellitus     Gestational Diabetes with last two pregnancies-diet controlled       Past Surgical History:  Past Surgical History:   Procedure Laterality Date    HX  SECTION      HX CHOLECYSTECTOMY      HX OTHER SURGICAL      cholecystectomy 2002       Family History:  Family History   Problem Relation Age of Onset    Diabetes Father     Hypertension Father     Stroke Father     Kidney Disease Father        Social History:  Social History     Tobacco Use    Smoking status: Never Smoker    Smokeless tobacco: Never Used   Substance Use Topics    Alcohol use: Yes     Comment: Socially    Drug use: No       Allergies: Allergies   Allergen Reactions    Amoxicillin Rash    Tramadol Hives         Review of Systems   Review of Systems   Constitutional: Positive for activity change. Negative for appetite change and chills. HENT: Negative for dental problem. Eyes: Negative for pain and redness. Respiratory: Negative for cough, choking and shortness of breath. Cardiovascular: Negative for chest pain. Gastrointestinal: Negative for abdominal pain, diarrhea and nausea. Genitourinary: Negative for dysuria. Musculoskeletal: Positive for arthralgias and neck pain. Negative for gait problem, joint swelling and neck stiffness. Neurological: Negative for light-headedness and numbness. Psychiatric/Behavioral: Negative for confusion. Physical Exam   Physical Exam   Constitutional: She is oriented to person, place, and time. She appears well-developed and well-nourished. No distress. HENT:   Head: Normocephalic and atraumatic. Mouth/Throat: No oropharyngeal exudate. Eyes: Pupils are equal, round, and reactive to light. No scleral icterus. Neck: Normal range of motion. Neck supple. Cardiovascular: Normal rate and regular rhythm. No murmur heard. Pulmonary/Chest: Effort normal and breath sounds normal. No respiratory distress. She has no wheezes. She has no rales. Abdominal: Soft. Bowel sounds are normal. She exhibits no distension. There is no tenderness.    No RUQ tenderness   Musculoskeletal: She exhibits tenderness. She exhibits no edema. Limited active ROM on the R, FROM on L  Strong radial pulse, good cap refill  +Neer's sign  Tenderness at the acromion process on the R   Lymphadenopathy:     She has no cervical adenopathy. Neurological: She is alert and oriented to person, place, and time. She displays normal reflexes. No cranial nerve deficit. Coordination normal.   Skin: Skin is warm and dry. No rash noted. She is not diaphoretic. Psychiatric: She has a normal mood and affect. Diagnostic Study Results     Labs -   No results found for this or any previous visit (from the past 12 hour(s)). Radiologic Studies -   XR SPINE CERV 4 OR 5 V   Final Result   IMPRESSION: No acute bony abnormality of the cervical spine . Mild degenerative   change. .         XR SHOULDER RT AP/LAT MIN 2 V   Final Result   IMPRESSION: No acute abnormality. CT Results  (Last 48 hours)    None        CXR Results  (Last 48 hours)    None            Medical Decision Making   I am the first provider for this patient. I reviewed the vital signs, available nursing notes, past medical history, past surgical history, family history and social history. Vital Signs-Reviewed the patient's vital signs. Patient Vitals for the past 12 hrs:   Temp Pulse Resp BP SpO2   07/23/19 1637 98.4 °F (36.9 °C) 90 16 152/89 100 %         Records Reviewed: Nursing Notes    Provider Notes (Medical Decision Making): This is a 36 yo lady presenting to the ED today for an eval of R shoulder pain after she fell while doing a cartwheel. Exam suggestive of possible rotator cuff tendinitis. Will treat with NSAIDs and obtain Xrays. ED Course:   Initial assessment performed. The patients presenting problems have been discussed, and they are in agreement with the care plan formulated and outlined with them. I have encouraged them to ask questions as they arise throughout their visit.     Reviewed Xrays, which are negative for acute process. Sling placed by tech in the ED. Dc home with NSAIDs and ortho follow up. Critical Care Time:   N/A    DISCHARGE NOTE:    Rosalba Ewing  results have been reviewed with her. She has been counseled regarding her diagnosis. She verbally conveys understanding and agreement of the signs, symptoms, diagnosis, treatment and prognosis and additionally agrees to follow up as recommended with Dr. Param Greenfield NP in 24 - 48 hours. She also agrees with the care-plan and conveys that all of her questions have been answered. I have also put together some discharge instructions for her that include: 1) educational information regarding their diagnosis, 2) how to care for their diagnosis at home, as well a 3) list of reasons why they would want to return to the ED prior to their follow-up appointment, should their condition change. She and/or family's questions have been answered. I have encouraged them to see the official results in [facility]\" or to retrieve the specifics of their results from medical records. PLAN:  1. Return precautions as discussed  2. Follow-up with providers as directed  3. Medications as prescribed    Return to ED if worse     Diagnosis     Clinical Impression:   1. Right shoulder tendinitis        Discharge Medication List as of 7/23/2019  6:10 PM      START taking these medications    Details   naproxen (NAPROSYN) 500 mg tablet Take 1 Tab by mouth two (2) times daily (with meals). , Normal, Disp-30 Tab, R-0         CONTINUE these medications which have NOT CHANGED    Details   metFORMIN (GLUCOPHAGE) 1,000 mg tablet Take 1,000 mg by mouth two (2) times daily (with meals). , Historical Med             Follow-up Information     Follow up With Specialties Details Why Contact Info    Param Greenfield NP Nurse Practitioner   [address] [address]  [place] [address] Rosita Ojeda MD Orthopedic Surgery   [address]  [phone]                12:35 PM  I was personally available for consultation in the emergency department. I have reviewed the chart and agree with the documentation recorded by the [facility], including the assessment, treatment plan, and disposition.   Bernetta Primrose, MD

## 2019-07-23 NOTE — LETTER
NOTIFICATION RETURN TO WORK / SCHOOL 
 
7/23/2019 6:08 PM 
 
Ms. Kvng Paula [address] [facility] [address] To Whom It May Concern: 
 
Kvng Paula is currently under the care of [facility] EMERGENCY DEPT. She will return to work/school on: 7/26/19 If there are questions or concerns please have the patient contact our office. Sincerely, William Fari PA-C

## 2019-07-23 NOTE — DISCHARGE INSTRUCTIONS
Patient Education        Rotator Cuff: Exercises  Introduction  Here are some examples of exercises for you to try. The exercises may be suggested for a condition or for rehabilitation. Start each exercise slowly. Ease off the exercises if you start to have pain. You will be told when to start these exercises and which ones will work best for you. How to do the exercises  Pendulum swing    1. Hold on to a table or the back of a chair with your good arm. Then bend forward a little and let your sore arm hang straight down. This exercise does not use the arm muscles. Rather, use your legs and your hips to create movement that makes your arm swing freely. 2. Use the movement from your hips and legs to guide the slightly swinging arm back and forth like a pendulum (or elephant trunk). Then guide it in circles that start small (about the size of a dinner plate). Make the circles a bit larger each day, as your pain allows. 3. Do this exercise for 5 minutes, 5 to 7 times each day. 4. As you have less pain, try bending over a little farther to do this exercise. This will increase the amount of movement at your shoulder. Posterior stretching exercise    1. Hold the elbow of your injured arm with your other hand. 2. Use your hand to pull your injured arm gently up and across your body. You will feel a gentle stretch across the back of your injured shoulder. 3. Hold for at least 15 to 30 seconds. Then slowly lower your arm. 4. Repeat 2 to 4 times. Up-the-back stretch    1. Put your hand in your back pocket. Let it rest there to stretch your shoulder. 2. With your other hand, hold your injured arm (palm outward) behind your back by the wrist. Pull your arm up gently to stretch your shoulder. 3. Next, put a towel over your other shoulder. Put the hand of your injured arm behind your back. Now hold the back end of the towel. With the other hand, hold the front end of the towel in front of your body.  Pull gently on the front end of the towel. This will bring your hand farther up your back to stretch your shoulder. Overhead stretch    1. Standing about an arm's length away, grasp onto a solid surface. You could use a countertop, a doorknob, or the back of a sturdy chair. 2. With your knees slightly bent, bend forward with your arms straight. Lower your upper body, and let your shoulders stretch. 3. As your shoulders are able to stretch farther, you may need to take a step or two backward. 4. Hold for at least 15 to 30 seconds. Then stand up and relax. If you had stepped back during your stretch, step forward so you can keep your hands on the solid surface. 5. Repeat 2 to 4 times. Shoulder flexion (lying down)    1. Lie on your back, holding a wand with both hands. Your palms should face down as you hold the wand. 2. Keeping your elbows straight, slowly raise your arms over your head. Raise them until you feel a stretch in your shoulders, upper back, and chest.  3. Hold for 15 to 30 seconds. 4. Repeat 2 to 4 times. Shoulder rotation (lying down)    1. Lie on your back. Hold a wand with both hands with your elbows bent and palms up. 2. Keep your elbows close to your body, and move the wand across your body toward the sore arm. 3. Hold for 8 to 12 seconds. 4. Repeat 2 to 4 times. Wall climbing (to the side)    1. Stand with your side to a wall so that your fingers can just touch it at an angle about 30 degrees toward the front of your body. 2. Walk the fingers of your injured arm up the wall as high as pain permits. Try not to shrug your shoulder up toward your ear as you move your arm up. 3. Hold that position for a count of at least 15 to 20.  4. Walk your fingers back down to the starting position. 5. Repeat at least 2 to 4 times. Try to reach higher each time. Wall climbing (to the front)    1. Face a wall, and stand so your fingers can just touch it.   2. Keeping your shoulder down, walk the fingers of your injured arm up the wall as high as pain permits. (Don't shrug your shoulder up toward your ear.)  3. Hold your arm in that position for at least 15 to 30 seconds. 4. Slowly walk your fingers back down to where you started. 5. Repeat at least 2 to 4 times. Try to reach higher each time. Shoulder blade squeeze    1. Stand with your arms at your sides, and squeeze your shoulder blades together. Do not raise your shoulders up as you squeeze. 2. Hold 6 seconds. 3. Repeat 8 to 12 times. Scapular exercise: Arm reach    1. Lie flat on your back. This exercise is a very slight motion that starts with your arms raised (elbows straight, arms straight). 2. From this position, reach higher toward the ziggy or ceiling. Keep your elbows straight. All motion should be from your shoulder blade only. 3. Relax your arms back to where you started. 4. Repeat 8 to 12 times. Arm raise to the side    1. Slowly raise your injured arm to the side, with your thumb facing up. Raise your arm 60 degrees at the most (shoulder level is 90 degrees). 2. Hold the position for 3 to 5 seconds. Then lower your arm back to your side. If you need to, bring your \"good\" arm across your body and place it under the elbow as you lower your injured arm. Use your good arm to keep your injured arm from dropping down too fast.  3. Repeat 8 to 12 times. 4. When you first start out, don't hold any extra weight in your hand. As you get stronger, you may use a 1-pound to 2-pound dumbbell or a small can of food. Shoulder flexor and extensor exercise    1. Push forward (flex): Stand facing a wall or doorjamb, about 6 inches or less back. Hold your injured arm against your body. Make a closed fist with your thumb on top. Then gently push your hand forward into the wall with about 25% to 50% of your strength. Don't let your body move backward as you push. Hold for about 6 seconds. Relax for a few seconds. Repeat 8 to 12 times.   2. Push backward (extend): Stand with your back flat against a wall. Your upper arm should be against the wall, with your elbow bent 90 degrees (your hand straight ahead). Push your elbow gently back against the wall with about 25% to 50% of your strength. Don't let your body move forward as you push. Hold for about 6 seconds. Relax for a few seconds. Repeat 8 to 12 times. Scapular exercise: Wall push-ups    1. Stand facing a wall, about 12 inches to 18 inches away. 2. Place your hands on the wall at shoulder height. 3. Slowly bend your elbows and bring your face to the wall. Keep your back and hips straight. 4. Push back to where you started. 5. Repeat 8 to 12 times. 6. When you can do this exercise against a wall comfortably, you can try it against a counter. You can then slowly progress to the end of a couch, then to a sturdy chair, and finally to the floor. Scapular exercise: Retraction    1. Put the band around a solid object at about waist level. (A bedpost will work well.) Each hand should hold an end of the band. 2. With your elbows at your sides and bent to 90 degrees, pull the band back. Your shoulder blades should move toward each other. Then move your arms back where you started. 3. Repeat 8 to 12 times. 4. If you have good range of motion in your shoulders, try this exercise with your arms lifted out to the sides. Keep your elbows at a 90-degree angle. Raise the elastic band up to about shoulder level. Pull the band back to move your shoulder blades toward each other. Then move your arms back where you started. Internal rotator strengthening exercise    1. Start by tying a piece of elastic exercise material to a doorknob. You can use surgical tubing or Thera-Band. 2. Stand or sit with your shoulder relaxed and your elbow bent 90 degrees. Your upper arm should rest comfortably against your side. Squeeze a rolled towel between your elbow and your body for comfort.  This will help keep your arm at your side.  3. Hold one end of the elastic band in the hand of the painful arm. 4. Slowly rotate your forearm toward your body until it touches your belly. Slowly move it back to where you started. 5. Keep your elbow and upper arm firmly tucked against the towel roll or at your side. 6. Repeat 8 to 12 times. External rotator strengthening exercise    1. Start by tying a piece of elastic exercise material to a doorknob. You can use surgical tubing or Thera-Band. (You may also hold one end of the band in each hand.)  2. Stand or sit with your shoulder relaxed and your elbow bent 90 degrees. Your upper arm should rest comfortably against your side. Squeeze a rolled towel between your elbow and your body for comfort. This will help keep your arm at your side. 3. Hold one end of the elastic band with the hand of the painful arm. 4. Start with your forearm across your belly. Slowly rotate the forearm out away from your body. Keep your elbow and upper arm tucked against the towel roll or the side of your body until you begin to feel tightness in your shoulder. Slowly move your arm back to where you started. 5. Repeat 8 to 12 times. Follow-up care is a key part of your treatment and safety. Be sure to make and go to all appointments, and call your doctor if you are having problems. It's also a good idea to know your test results and keep a list of the medicines you take. Where can you learn more? Go to http://asael-sloan.info/. Enter Carolann Jennings in the search box to learn more about \"Rotator Cuff: Exercises. \"  Current as of: September 20, 2018  Content Version: 12.1  © 3791-4192 [employer]. Care instructions adapted under license by [employer] (which disclaims liability or warranty for this information).  If you have questions about a medical condition or this instruction, always ask your healthcare professional. [address]  any warranty or liability for your use of this information. Patient Education        Rotator Cuff Injury: Care Instructions  Your Care Instructions  The rotator cuff is a group of tendons and muscles around the shoulder that keeps the upper arm bone in place. It keeps the shoulder joint stable and allows you to raise and rotate your arm. Damage to the rotator cuff can be caused by overuse, a fall, or a direct blow to the shoulder area, which can tear the tendons. Over time, everyday wear can damage the tendons and make injury more likely. Treatment can depend upon the amount of damage to the tendons. In a younger person, surgery may be the first choice. But if you are older than 25, you likely have some wear on your rotator cuff. Surgery may not be the most effective treatment. Your doctor may have you try physical therapy and exercise first.  Follow-up care is a key part of your treatment and safety. Be sure to make and go to all appointments, and call your doctor if you are having problems. It's also a good idea to know your test results and keep a list of the medicines you take. How can you care for yourself at home? · Rest your shoulder as much as you can. If your doctor put your arm in a sling or shoulder immobilizer, wear it as directed. Do not take it off before your doctor tells you to. If it is too tight, loosen it. · Be safe with medicines. Read and follow all instructions on the label. ? If the doctor gave you a prescription medicine for pain, take it as prescribed. ? If you are not taking a prescription pain medicine, ask your doctor if you can take an over-the-counter medicine. · Put ice or a cold pack on your shoulder for 10 to 20 minutes at a time. Try to do this every 1 to 2 hours for the next 3 days (when you are awake). Put a thin cloth between the ice pack and your skin. · After 3 days, put a warm, wet towel on your shoulder. This is to relax the muscles and help blood flow.   · While holding a warm, wet towel on your shoulder, lean forward so your arm hangs freely, and gently swing your arm back and forth like a pendulum. You also can do this standing under a warm shower. · Do not do anything that makes your pain worse. · Follow your doctor's advice about whether you need physical therapy. When should you call for help? Call your doctor now or seek immediate medical care if:    · You have severe pain.     · You cannot move your shoulder or arm.     · You have tingling or numbness in your arm or hand.     · Your arm or hand is cool or pale.    Watch closely for changes in your health, and be sure to contact your doctor if:    · Your pain gets worse.     · You have new or worse swelling in your arm or hand.     · You do not get better as expected. Where can you learn more? Go to http://asael-sloan.info/. Enter [id number] [id number] in the search box to learn more about \"Rotator Cuff Injury: Care Instructions. \"  Current as of: September 20, 2018  Content Version: 12.1  © 3074-7842 [employer]. Care instructions adapted under license by [employer] (which disclaims liability or warranty for this information). If you have questions about a medical condition or this instruction, always ask your healthcare professional. [address] any warranty or liability for your use of this information.

## 2019-07-24 ENCOUNTER — HOSPITAL ENCOUNTER (EMERGENCY)
Age: 34
Discharge: HOME | End: 2019-07-24
Attending: EMERGENCY MEDICINE
Payer: SELF-PAY

## 2019-07-24 VITALS
HEART RATE: 88 BPM | OXYGEN SATURATION: 100 % | SYSTOLIC BLOOD PRESSURE: 150 MMHG | RESPIRATION RATE: 15 BRPM | TEMPERATURE: 98.6 F | BODY MASS INDEX: 34.42 KG/M2 | DIASTOLIC BLOOD PRESSURE: 92 MMHG | HEIGHT: 65 IN | WEIGHT: 206.57 LBS

## 2019-07-24 DIAGNOSIS — S46.911A STRAIN OF RIGHT SHOULDER, INITIAL ENCOUNTER: Primary | ICD-10-CM

## 2019-07-24 PROCEDURE — 99281 EMR DPT VST MAYX REQ PHY/QHP: CPT

## 2019-07-24 RX ORDER — METHOCARBAMOL 500 MG/1
500 TABLET, FILM COATED ORAL 4 TIMES DAILY
Qty: 20 TAB | Refills: 0 | Status: SHIPPED | OUTPATIENT
Start: 2019-07-24 | End: 2019-08-08

## 2019-07-24 RX ORDER — HYDROCODONE BITARTRATE AND ACETAMINOPHEN 5; 325 MG/1; MG/1
1 TABLET ORAL
Qty: 12 TAB | Refills: 0 | Status: SHIPPED | OUTPATIENT
Start: 2019-07-24 | End: 2019-07-27

## 2019-07-24 NOTE — ED PROVIDER NOTES
EMERGENCY DEPARTMENT HISTORY AND PHYSICAL EXAM      Date: 7/24/2019  Patient Name: Kathy Yoder  Patient Age and Sex: 37 y.o. female     History of Presenting Illness     Chief Complaint   Patient presents with    Shoulder Pain     R shoulder pain since Sunday after catching herself on that arm at cheer practice; seen here yesterday but reports the pain is unbearable       History Provided By: Patient    HPI: Kathy Yoder is a 78-year-old female with no significant past medical history presenting with right shoulder pain. Patient states that 2 days ago she was at [facility] and was showing her kids to do a cartwheel when she landed on her right shoulder. Was seen in the ER yesterday and had x-rays done and was told she had a tendinitis. Has been taking the naproxen as well as her Flexeril at home however has continued to have the pain. States that the pain is a 10 out of 10 and she is been unable to move her arm. Patient states that she tried the shoulder sling but it did not help with the comfort just made it worse. Has been using heat and trying to do exercises but unable to even move it to do the exercises. There are no other complaints, changes, or physical findings at this time. PCP: Sena Treviño NP    No current facility-administered medications on file prior to encounter. Current Outpatient Medications on File Prior to Encounter   Medication Sig Dispense Refill    naproxen (NAPROSYN) 500 mg tablet Take 1 Tab by mouth two (2) times daily (with meals). 30 Tab 0    metFORMIN (GLUCOPHAGE) 1,000 mg tablet Take 1,000 mg by mouth two (2) times daily (with meals).          Past History     Past Medical History:  Past Medical History:   Diagnosis Date    Abnormal Pap smear     abnormal pap at 15 y/o normal since     Back pain 2015 feb    [facility]     Diabetes mellitus     Gestational Diabetes with last two pregnancies-diet controlled       Past Surgical History:  Past Surgical History:   Procedure Laterality Date    HX  SECTION      HX CHOLECYSTECTOMY      HX OTHER SURGICAL      cholecystectomy 2002       Family History:  Family History   Problem Relation Age of Onset    Diabetes Father     Hypertension Father     Stroke Father     Kidney Disease Father        Social History:  Social History     Tobacco Use    Smoking status: Never Smoker    Smokeless tobacco: Never Used   Substance Use Topics    Alcohol use: Yes     Comment: Socially    Drug use: No       Allergies: Allergies   Allergen Reactions    Amoxicillin Rash    Tramadol Hives         Review of Systems   Review of Systems   Constitutional: Negative for chills and fever. Respiratory: Negative for cough and shortness of breath. Cardiovascular: Negative for chest pain. Gastrointestinal: Negative for abdominal pain, constipation, diarrhea, nausea and vomiting. Musculoskeletal: Positive for arthralgias and joint swelling. Neurological: Negative for weakness and numbness. All other systems reviewed and are negative. Physical Exam   Physical Exam   Constitutional: She is oriented to person, place, and time. She appears well-developed and well-nourished. HENT:   Head: Normocephalic and atraumatic. Eyes: Conjunctivae and EOM are normal.   Neck: Normal range of motion. Neck supple. Cardiovascular: Normal rate and regular rhythm. Pulmonary/Chest: Effort normal and breath sounds normal. No respiratory distress. She has no wheezes. Abdominal: Soft. She exhibits no distension. There is no tenderness. Musculoskeletal: Normal range of motion. Patient is exquisitely tender over her anterior shoulder, over the insertion point for biceps and pectoralis muscle. Superior aspect of the pectoralis muscle. Neurological: She is alert and oriented to person, place, and time. Skin: Skin is warm and dry. Psychiatric: She has a normal mood and affect.    Nursing note and vitals reviewed. Diagnostic Study Results     Labs -   No results found for this or any previous visit (from the past 12 hour(s)). Radiologic Studies -   No orders to display     CT Results  (Last 48 hours)    None        CXR Results  (Last 48 hours)    None            Medical Decision Making   I am the first provider for this patient. I reviewed the vital signs, available nursing notes, past medical history, past surgical history, family history and social history. Vital Signs-Reviewed the patient's vital signs. Patient Vitals for the past 12 hrs:   Temp Pulse Resp BP SpO2   07/24/19 1053 98.6 °F (37 °C) 88 15 (!) 150/92 100 %       Records Reviewed: Nursing Notes and Old Medical Records    Provider Notes (Medical Decision Making):   Patient presenting for right shoulder pain after recent trauma. Was seen in the ER yesterday and x-rays were done which were negative. I reviewed them again. She is exquisitely tender over her tendon and muscle. Most likely muscle strain versus tendinitis. We will add narcotic pain medication to the naproxen she Alphonso has as well as Robaxin muscle relaxant. Discussed the importance of stretching out that muscle. ED Course:   Initial assessment performed. The patients presenting problems have been discussed, and they are in agreement with the care plan formulated and outlined with them. I have encouraged them to ask questions as they arise throughout their visit. Critical Care Time:   0    Disposition:  Discharge Note:  The patient has been re-evaluated and is ready for discharge. Reviewed available results with patient. Counseled patient on diagnosis and care plan. Patient has expressed understanding, and all questions have been answered. Patient agrees with plan and agrees to follow up as recommended, or to return to the ED if their symptoms worsen.  Discharge instructions have been provided and explained to the patient, along with reasons to return to the ED. PLAN:  Discharge Medication List as of 7/24/2019  1:22 PM      START taking these medications    Details   HYDROcodone-acetaminophen (NORCO) 5-325 mg per tablet Take 1 Tab by mouth every six (6) hours as needed for Pain for up to 3 days. Max Daily Amount: 4 Tabs., Print, Disp-12 Tab, R-0      methocarbamol (ROBAXIN) 500 mg tablet Take 1 Tab by mouth four (4) times daily. , Normal, Disp-20 Tab, R-0         CONTINUE these medications which have NOT CHANGED    Details   naproxen (NAPROSYN) 500 mg tablet Take 1 Tab by mouth two (2) times daily (with meals). , Normal, Disp-30 Tab, R-0      metFORMIN (GLUCOPHAGE) 1,000 mg tablet Take 1,000 mg by mouth two (2) times daily (with meals). , Historical Med           2. Follow-up Information     Follow up With Specialties Details Why Contact Info    Elizabeth Hayden NP Nurse Practitioner  As needed [address]  [phone]          3. Return to ED if worse     Diagnosis     Clinical Impression:   1. Strain of right shoulder, initial encounter        Attestations:    Victor Manuel Ryan M.D. Please note that this dictation was completed with Evolver, the computer voice recognition software. Quite often unanticipated grammatical, syntax, homophones, and other interpretive errors are inadvertently transcribed by the computer software. Please disregard these errors. Please excuse any errors that have escaped final proofreading. Thank you.

## 2019-08-08 ENCOUNTER — HOSPITAL ENCOUNTER (EMERGENCY)
Age: 34
Discharge: HOME | End: 2019-08-08
Attending: EMERGENCY MEDICINE | Admitting: EMERGENCY MEDICINE
Payer: SELF-PAY

## 2019-08-08 VITALS
HEIGHT: 65 IN | DIASTOLIC BLOOD PRESSURE: 74 MMHG | BODY MASS INDEX: 33.98 KG/M2 | SYSTOLIC BLOOD PRESSURE: 142 MMHG | RESPIRATION RATE: 16 BRPM | TEMPERATURE: 99.1 F | HEART RATE: 97 BPM | WEIGHT: 203.93 LBS | OXYGEN SATURATION: 95 %

## 2019-08-08 DIAGNOSIS — M51.36 DDD (DEGENERATIVE DISC DISEASE), LUMBAR: ICD-10-CM

## 2019-08-08 DIAGNOSIS — M54.41 ACUTE BILATERAL LOW BACK PAIN WITH RIGHT-SIDED SCIATICA: Primary | ICD-10-CM

## 2019-08-08 DIAGNOSIS — E11.9 TYPE 2 DIABETES MELLITUS WITHOUT COMPLICATION, WITHOUT LONG-TERM CURRENT USE OF INSULIN (HCC): ICD-10-CM

## 2019-08-08 PROCEDURE — 99283 EMERGENCY DEPT VISIT LOW MDM: CPT

## 2019-08-08 PROCEDURE — 74011250637 HC RX REV CODE- 250/637: Performed by: PHYSICIAN ASSISTANT

## 2019-08-08 RX ORDER — METHOCARBAMOL 750 MG/1
1500 TABLET, FILM COATED ORAL 4 TIMES DAILY
Qty: 40 TAB | Refills: 0 | Status: SHIPPED | OUTPATIENT
Start: 2019-08-08 | End: 2019-08-13

## 2019-08-08 RX ORDER — HYDROCODONE BITARTRATE AND ACETAMINOPHEN 5; 325 MG/1; MG/1
1 TABLET ORAL
Qty: 10 TAB | Refills: 0 | Status: SHIPPED | OUTPATIENT
Start: 2019-08-08 | End: 2019-08-13

## 2019-08-08 RX ORDER — DIAZEPAM 5 MG/1
5 TABLET ORAL
Status: COMPLETED | OUTPATIENT
Start: 2019-08-08 | End: 2019-08-08

## 2019-08-08 RX ORDER — KETOROLAC TROMETHAMINE 10 MG/1
10 TABLET, FILM COATED ORAL ONCE
Status: COMPLETED | OUTPATIENT
Start: 2019-08-08 | End: 2019-08-08

## 2019-08-08 RX ORDER — ONDANSETRON 4 MG/1
4 TABLET, ORALLY DISINTEGRATING ORAL
Status: COMPLETED | OUTPATIENT
Start: 2019-08-08 | End: 2019-08-08

## 2019-08-08 RX ORDER — OXYCODONE AND ACETAMINOPHEN 5; 325 MG/1; MG/1
1 TABLET ORAL
Status: COMPLETED | OUTPATIENT
Start: 2019-08-08 | End: 2019-08-08

## 2019-08-08 RX ORDER — MELOXICAM 15 MG/1
15 TABLET ORAL DAILY
Qty: 10 TAB | Refills: 0 | Status: SHIPPED | OUTPATIENT
Start: 2019-08-08 | End: 2019-08-18

## 2019-08-08 RX ADMIN — OXYCODONE HYDROCHLORIDE AND ACETAMINOPHEN 1 TABLET: 5; 325 TABLET ORAL at 11:37

## 2019-08-08 RX ADMIN — KETOROLAC TROMETHAMINE 10 MG: 10 TABLET, FILM COATED ORAL at 11:37

## 2019-08-08 RX ADMIN — DIAZEPAM 5 MG: 5 TABLET ORAL at 11:37

## 2019-08-08 RX ADMIN — ONDANSETRON 4 MG: 4 TABLET, ORALLY DISINTEGRATING ORAL at 11:37

## 2019-08-08 NOTE — LETTER
[facility] EMERGENCY DEPT 
[address] [place] [address] [phone] Work/School Note Date: 8/8/2019 To Whom It May concern: 
 
Oriana Marshall was seen and treated today in the emergency room. She may return to work in 1 to 2 days, as symptoms improve. Sincerely, Brady Gonzalez

## 2019-08-08 NOTE — ED NOTES
Discharge instructions given to patient by ANDREW Corona. Verbalized understanding of instructions. Patient discharged without difficulty. Patient discharged in stable condition via W/C accompanied by family.

## 2019-08-08 NOTE — DISCHARGE INSTRUCTIONS
Rest, alternate ice/moist heat, gentle stretching, massage. Avoid prolonged sitting. Follow up with Orthopedist for recheck. Contact information provided for Orthopedist if symptoms persist.  Return to the Emergency Dept for any continued/worsening pain.

## 2019-08-09 NOTE — ED PROVIDER NOTES
EMERGENCY DEPARTMENT HISTORY AND PHYSICAL EXAM      Date: 8/8/2019  Patient Name: Anibal Sebastian    History of Presenting Illness     Chief Complaint   Patient presents with    Back Pain     Pain at right low back radiating down right leg since Tues, denies specific injury, reports lifting children       History Provided By: Patient    HPI: Anibal Sebastian, 37 y.o. female presents ambulatory to the Emergency Dept with c/o R lower back pain which radiates into her R LE since 8/6/19. Pt denied injury but reports she has been doing some lifting of children recently. She has a h/o chronic back pain with sciatica but has not seen an Orthopedic specialist for same. She denied constipation/straining. No numbness/weakness. No incontinence of bowel/bladder. No rash/lesion. Pt states she is diabetic and prefers use of steroids. No dysuria/hematuria. Chief Complaint: R lower back pain radiating into RLE  Duration: 2 Days  Timing:  Acute  Location: R lower lumbar region/R LE  Quality: Aching  Severity: Moderate  Modifying Factors: increased pain with movement, position changes  Associated Symptoms: denies any other associated signs or symptoms        There are no other complaints, changes, or physical findings at this time. PCP: Valeria Mclaughlin NP    Current Outpatient Medications   Medication Sig Dispense Refill    methocarbamol (ROBAXIN) 750 mg tablet Take 2 Tabs by mouth four (4) times daily for 5 days. 40 Tab 0    HYDROcodone-acetaminophen (LORTAB 5-325) 5-325 mg per tablet Take 1 Tab by mouth every eight (8) hours as needed for Pain for up to 5 days. Max Daily Amount: 3 Tabs. 10 Tab 0    meloxicam (MOBIC) 15 mg tablet Take 1 Tab by mouth daily for 10 days. 10 Tab 0    metFORMIN (GLUCOPHAGE) 1,000 mg tablet Take 1,000 mg by mouth two (2) times daily (with meals).          Past History     Past Medical History:  Past Medical History:   Diagnosis Date    Abnormal Pap smear     abnormal pap at 13 y/o normal since     Back pain 2015    [facility]  emergency [facility]    Diabetes [facility])     Diabetes mellitus     Gestational Diabetes with last two pregnancies-diet controlled       Past Surgical History:  Past Surgical History:   Procedure Laterality Date    HX  SECTION      HX CHOLECYSTECTOMY      HX OTHER SURGICAL      cholecystectomy 2002       Family History:  Family History   Problem Relation Age of Onset    Diabetes Father     Hypertension Father     Stroke Father     Kidney Disease Father        Social History:  Social History     Tobacco Use    Smoking status: Never Smoker    Smokeless tobacco: Never Used   Substance Use Topics    Alcohol use: Yes     Comment: Socially    Drug use: No       Allergies: Allergies   Allergen Reactions    Amoxicillin Rash    Tramadol Hives         Review of Systems   Review of Systems   Constitutional: Negative for chills and fever. HENT: Negative for congestion, rhinorrhea and sore throat. Eyes: Negative for photophobia and visual disturbance. Respiratory: Negative for cough and shortness of breath. Cardiovascular: Negative for chest pain and palpitations. Gastrointestinal: Negative for abdominal pain, diarrhea, nausea and vomiting. Endocrine: Negative for polydipsia, polyphagia and polyuria. Genitourinary: Negative for dysuria and hematuria. Musculoskeletal: Positive for arthralgias and back pain. Negative for neck pain and neck stiffness. Skin: Negative for rash and wound. Allergic/Immunologic: Negative for food allergies and immunocompromised state. Neurological: Negative for dizziness, weakness and headaches. Hematological: Negative for adenopathy. Does not bruise/bleed easily. Psychiatric/Behavioral: Negative for agitation and confusion. Physical Exam   Physical Exam   Constitutional: She is oriented to person, place, and time. She appears well-developed and well-nourished. No distress.    WDWN AA female, alert, in NAD HENT:   Head: Normocephalic and atraumatic. Nose: Nose normal.   Mouth/Throat: Oropharynx is clear and moist. No oropharyngeal exudate. Eyes: Pupils are equal, round, and reactive to light. Conjunctivae and EOM are normal. Right eye exhibits no discharge. Left eye exhibits no discharge. No scleral icterus. Neck: Normal range of motion. Neck supple. No JVD present. No tracheal deviation present. No thyromegaly present. Cardiovascular: Normal rate, regular rhythm and normal heart sounds. Pulmonary/Chest: Effort normal and breath sounds normal. No respiratory distress. She has no wheezes. She exhibits no tenderness. Abdominal: Soft. She exhibits no distension. There is no tenderness. There is no rebound and no guarding. No CVAT   Musculoskeletal: She exhibits tenderness. She exhibits no edema. Decreased A/P ROM to R paralumbar region due to tenderness with palpation and movement. No deformity noted. No midline spinal tenderness. +R SLR   Pt observed to ambulate without deficit. 2+ distal pulses, NVI. Sensation grossly intact to light touch. Lymphadenopathy:     She has no cervical adenopathy. Neurological: She is alert and oriented to person, place, and time. She exhibits normal muscle tone. Coordination normal.   Skin: Skin is warm and dry. No rash noted. She is not diaphoretic. No erythema. Psychiatric: She has a normal mood and affect. Her behavior is normal. Judgment normal.   Nursing note and vitals reviewed. Diagnostic Study Results     Labs -   No results found for this or any previous visit (from the past 12 hour(s)). Radiologic Studies -   No orders to display         Medical Decision Making   I am the first provider for this patient. I reviewed the vital signs, available nursing notes, past medical history, past surgical history, family history and social history. Vital Signs-Reviewed the patient's vital signs. No data found.         Records Reviewed: Nursing Notes, Old Medical Records, Previous Radiology Studies and Previous Laboratory Studies    Provider Notes (Medical Decision Making):   Strain, spasm, exac of chronic pain    ED Course:   Initial assessment performed. The patients presenting problems have been discussed, and they are in agreement with the care plan formulated and outlined with them. I have encouraged them to ask questions as they arise throughout their visit. DISCHARGE NOTE:  The care plan has been outline with the patient and/or family, who verbally conveyed understanding and agreement. Available results have been reviewed. Patient and/or family understand the follow up plan as outlined and discharge instructions. Should their condition deterioration at any time after discharge the patient agrees to return, follow up sooner than outlined or seek medical assistance at the closest Emergency Room as soon as possible. Questions have been answered. Discharge instructions and educational information regarding the patient's diagnosis as well a list of reasons why the patient would want to seek immediate medical attention, should their condition change, were reviewed directly with the patient/family       PLAN:  1. Discharge Medication List as of 8/8/2019 11:47 AM      START taking these medications    Details   methocarbamol (ROBAXIN) 750 mg tablet Take 2 Tabs by mouth four (4) times daily for 5 days. , Print, Disp-40 Tab, R-0      HYDROcodone-acetaminophen (LORTAB 5-325) 5-325 mg per tablet Take 1 Tab by mouth every eight (8) hours as needed for Pain for up to 5 days. Max Daily Amount: 3 Tabs., Print, Disp-10 Tab, R-0      meloxicam (MOBIC) 15 mg tablet Take 1 Tab by mouth daily for 10 days. , Print, Disp-10 Tab, R-0         CONTINUE these medications which have NOT CHANGED    Details   metFORMIN (GLUCOPHAGE) 1,000 mg tablet Take 1,000 mg by mouth two (2) times daily (with meals). , Historical Med           2.    Follow-up Information     Follow up With Specialties Details Why Contact Info    Rachana Jean MD Orthopedic Surgery  As needed [address]  [address] [facility],[address]  [phone]      [facility] EMERGENCY DEPT Emergency Medicine  If symptoms worsen [address] [place] [address] Drive  [address] [facility]  [phone]        Return to ED if worse     Diagnosis     Clinical Impression:   1. Acute bilateral low back pain with right-sided sciatica    2. DDD (degenerative disc disease), lumbar    3.  Type 2 diabetes mellitus without complication, without long-term current use of insulin ([facility] [address]

## 2019-08-24 ENCOUNTER — HOSPITAL ENCOUNTER (EMERGENCY)
Age: 34
Discharge: HOME | End: 2019-08-24
Attending: EMERGENCY MEDICINE
Payer: SELF-PAY

## 2019-08-24 VITALS
WEIGHT: 205.25 LBS | SYSTOLIC BLOOD PRESSURE: 141 MMHG | OXYGEN SATURATION: 100 % | HEART RATE: 87 BPM | RESPIRATION RATE: 16 BRPM | TEMPERATURE: 97.6 F | DIASTOLIC BLOOD PRESSURE: 88 MMHG | BODY MASS INDEX: 34.16 KG/M2

## 2019-08-24 DIAGNOSIS — G50.1 ATYPICAL FACIAL PAIN: ICD-10-CM

## 2019-08-24 DIAGNOSIS — K08.89 DENTALGIA: Primary | ICD-10-CM

## 2019-08-24 DIAGNOSIS — K08.9 POOR DENTITION: ICD-10-CM

## 2019-08-24 PROCEDURE — 99282 EMERGENCY DEPT VISIT SF MDM: CPT

## 2019-08-24 RX ORDER — LIDOCAINE HYDROCHLORIDE 20 MG/ML
15 SOLUTION OROPHARYNGEAL AS NEEDED
Qty: 1 BOTTLE | Refills: 0 | Status: SHIPPED | OUTPATIENT
Start: 2019-08-24 | End: 2019-09-23

## 2019-08-24 RX ORDER — DIFLUNISAL 500 MG/1
500 TABLET, FILM COATED ORAL 2 TIMES DAILY
Qty: 20 TAB | Refills: 0 | Status: SHIPPED | OUTPATIENT
Start: 2019-08-24 | End: 2019-10-20

## 2019-08-24 RX ORDER — CLINDAMYCIN HYDROCHLORIDE 300 MG/1
300 CAPSULE ORAL 4 TIMES DAILY
Qty: 28 CAP | Refills: 0 | Status: SHIPPED | OUTPATIENT
Start: 2019-08-24 | End: 2019-08-31

## 2019-08-24 NOTE — ED NOTES
Brady Bowman reviewed discharge instructions with the patient. The patient verbalized understanding. All questions and concerns were addressed. The patient declined a wheelchair and is discharged ambulatory in the care of family members with instructions and prescriptions in hand. Pt is alert and oriented x 4. Respirations are clear and unlabored.

## 2019-08-24 NOTE — DISCHARGE INSTRUCTIONS
Patient Education   Dental resources:    Emergency [address]   1275 [facility], [address]   Monday, Wednesday, Friday: 8am-5pm   Tuesday and Thursday: 8am-6pm   Phone: [phone]   $70 for Emergency Care   $60 for first routine care, then pay by sliding scale based upon income     [address]   Phone: [phone], select option (2) to confirm time for treatment     [facility]   [address] [facility]   Monday-Friday: 8am-4pm   Phone: [phone] of Dentistry Urgent [facility], 1000 [facility], [facility] [facility] [address]   Phone: [phone] to confirm a time for emergency treatment   Pediatrics: [phone]   $75 per tooth, extractions only     [facility]   [address], [address] [address]   Phone [phone] or [phone]   Hours [id number] (extractions)   Simple tooth extraction: $60 per tooth, $55 per x-ray     [facility] (in [place])   [facility] AT [place] only   Phone: [phone], leave message saying you need an appointment to register   Hours: Wed 6-9p       Non-Urgent Bruno MARTIN [facility] at [address] [place] [address]   Dental Clinic: [phone]   Oral Surgery Clinic: [phone]     Emergency [address] [facility] [address] by [facility]  1138 [facility], [address] [place] [address]  Open M, W, F: 8AM - 5PM and T, Th: 8AM-6PM  Phone: [phone], press 4  $70 for Emergency Care  $60 for first routine care, then pay by sliding scale based upon income.     [facility]  [facility] [address]  Phone: [phone]    [facility]  [address] [facility], [address]  Open Monday - Friday 8AM - 4:30 PM  Phone: [phone] of Dentistry [address] of Dentistry, [facility] 45, 1st 1000 [facility] starting at 8:30 AM M-F  Phone: [phone], press 2  Fee: $150 per tooth (x-ray & extractions only)  Pediatrics Phone[de-identified] [phone], 8-5 M-F    [address] of Dentistry, [facility] 45, 2nd 211 [facility] starting at 8:30 AM - 3  [facility] [address]  [address] [facility], [address] [place] [address]  Phone: [phone] or [phone]  Emergency Hours: 9:30AM - 11AM (extractions)  Simple tooth extraction $ per tooth. #75 for x-ray    [facility] Residents only, over the age of 25  Phone: [phone]. Leave message saying you need an appointment to register. Hours: Tuesday Evenings        Dental Pain: After Your Visit  Your Care Instructions  The most common cause of dental pain is tooth decay. It can also be caused by an infection of the tooth (abscess) or gum, a tooth that has not broken all the way through the gum (impacted tooth), or a problem with the nerve-filled center of the tooth. Follow-up care is a key part of your treatment and safety. Be sure to make and go to all appointments, and call your doctor if you are having problems. Its also a good idea to know your test results and keep a list of the medicines you take. How can you care for yourself at home? · Contact a dentist for follow-up care. · Put ice or a cold pack on the outside of your mouth for 10 to 20 minutes at a time to reduce pain and swelling. Put a thin cloth between the ice and your skin. · Take an over-the-counter pain medicine, such as acetaminophen (Tylenol), ibuprofen (Advil, Motrin), or naproxen (Aleve). Read and follow all instructions on the label. · Do not take two or more pain medicines at the same time unless the doctor told you to. Many pain medicines have acetaminophen, which is Tylenol.  Too much acetaminophen (Tylenol) can be harmful. · Rinse your mouth with warm salt water every 2 hours to help relieve pain and swelling from an infected tooth. Mix 1 teaspoon of salt in 8 ounces of water. · If your doctor prescribed antibiotics, take them as directed. Do not stop taking them just because you feel better. You need to take the full course of antibiotics. When should you call for help? Call your doctor now or seek immediate medical care if:  · You have signs of infection, such as:  ¨ Increased pain, swelling, warmth, or redness. ¨ Pus draining from the gum, tooth, or face. ¨ A fever. Watch closely for changes in your health, and be sure to contact your doctor if:  · You do not get better as expected. Where can you learn more? Go to Cruise Compare[employer]be  Enter V264 in the search box to learn more about \"Dental Pain: After Your Visit. \"   © 5681-2900 [employer]. Care instructions adapted under license by [employer] (which disclaims liability or warranty for this information). This care instruction is for use with your licensed healthcare professional. If you have questions about a medical condition or this instruction, always ask your healthcare professional. [place] [address] any warranty or liability for your use of this information. Content Version: 47.7.655148; Last Revised: May 17, 2013                 Patient Education        Head or Face Pain: Care Instructions  Your Care Instructions    Common causes of head or face pain are allergies, stress, and injuries. Other causes include tooth problems and sinus infections. Eating certain foods, such as chocolate or cheese, or drinking certain liquids, such as coffee or cola, can cause head pain for some people. If you have mild head pain, you may not need treatment. It is important to watch your symptoms and talk to your doctor if your pain continues or gets worse.   Follow-up care is a key part of your treatment and safety. Be sure to make and go to all appointments, and call your doctor if you are having problems. It's also a good idea to know your test results and keep a list of the medicines you take. How can you care for yourself at home? · Take pain medicines exactly as directed. ? If the doctor gave you a prescription medicine for pain, take it as prescribed. ? If you are not taking a prescription pain medicine, ask your doctor if you can take an over-the-counter pain medicine. · Take it easy for the next few days or longer if you are not feeling well. · Use a warm, moist towel or heating pad set on low to relax tight muscles in your shoulder and neck. Have someone gently massage your neck and shoulders. · Put ice or a cold pack on the area for 10 to 20 minutes at a time. Put a thin cloth between the ice and your skin. When should you call for help? Call 911 anytime you think you may need emergency care. For example, call if:    · You have twitching, jerking, or a seizure.     · You passed out (lost consciousness).     · You have symptoms of a stroke. These may include:  ? Sudden numbness, tingling, weakness, or loss of movement in your face, arm, or leg, especially on only one side of your body. ? Sudden vision changes. ? Sudden trouble speaking. ? Sudden confusion or trouble understanding simple statements. ? Sudden problems with walking or balance. ? A sudden, severe headache that is different from past headaches.     · You have jaw pain and pain in your chest, shoulder, neck, or arm.    Call your doctor now or seek immediate medical care if:    · You have a fever with a stiff neck or a severe headache.     · You have nausea and vomiting, or you cannot keep food or liquids down.    Watch closely for changes in your health, and be sure to contact your doctor if:    · Your head or face pain does not get better as expected. Where can you learn more?   Go to http://asael-sloan.info/. Enter P568 in the search box to learn more about \"Head or Face Pain: Care Instructions. \"  Current as of: September 23, 2018  Content Version: 12.1  © 2006-2019 [employer]. Care instructions adapted under license by [employer] (which disclaims liability or warranty for this information). If you have questions about a medical condition or this instruction, always ask your healthcare professional. [place] [address] any warranty or liability for your use of this information. Patient Education        Periodontal Conditions: Care Instructions  Your Care Instructions    Periodontal conditions affect the gums, bone, and tissue that surround and support the teeth. The most common problems are caused by plaque. Plaque is a thin film of bacteria that sticks to teeth above and below the gum line. It can build up and harden into tartar. The bacteria in plaque and tartar can cause gum disease. Gingivitis is a disease that affects the gums (gingiva). The gums are the soft tissue that surrounds the teeth. Gingivitis causes red, swollen, tender gums that bleed easily when brushed, persistent bad breath, and sensitive teeth. Because it is not painful, many people do not get treatment when they should. Gingivitis can be reversed with good dental care. Periodontitis is a more advanced disease that affects more than the gums. The gums pull away from the teeth. This leaves deep pockets where bacteria can grow. The disease can damage the bones that support the teeth. The teeth may get loose and fall out. A periodontal condition should be treated as soon as it is found. Finding gum problems early, treating them right away, and having regular checkups bring the best results. You can treat mild periodontal conditions by brushing and flossing your teeth every day.  Your dentist may prescribe a mouthwash to kill the bacteria that can damage teeth and gums. Your dentist may have you take antibiotics to treat infection from moderate periodontal disease. If your gums have pulled away from your teeth, you may need cleaning between the teeth and gums right down to the teeth roots. This is called root planing and scaling. If you have severe periodontal disease, you may need surgery to remove diseased gum tissue or repair bone damage. Follow-up care is a key part of your treatment and safety. Be sure to make and go to all appointments, and call your dentist if you are having problems. It's also a good idea to know your test results and keep a list of the medicines you take. How can you care for yourself at home? · If your dentist prescribed antibiotics, take them as directed. Do not stop taking them just because you feel better. You need to take the full course of antibiotics. · Brush your teeth twice a day, in the morning and at night. ? Use a toothbrush with soft, rounded-end bristles and a head that is small enough to reach all parts of your teeth and mouth. Replace your toothbrush every 3 to 4 months. ? Use a fluoride toothpaste. ? Place the brush at a 45-degree angle where the teeth meet the gums. Press firmly, and gently rock the brush back and forth using small circular movements. ? Brush chewing surfaces vigorously with short back-and-forth strokes. ? Brush your tongue from back to front. · Floss at least once a day. Choose the type and flavor that you like best.  · Have your teeth cleaned by a professional at least twice a year. · Ask your dentist about using an antibacterial mouthwash to help reduce bacteria. · Rinse your mouth with water or chew sugar-free gum after meals if you can't brush your teeth. · Do not smoke or use smokeless tobacco. Tobacco use can cause periodontal disease. When should you call for help?   Call your dentist now or seek immediate medical care if:    · You have symptoms of infection, such as:  ? Increased pain, swelling, warmth, or redness. ? Red streaks leading from the area. ? Pus draining from the area. ? A fever.    Watch closely for changes in your health, and be sure to contact your dentist if:    · You have new or worse tooth pain.     · You do not get better as expected. Where can you learn more? Go to http://asael-sloan.info/. Enter S217 in the search box to learn more about \"Periodontal Conditions: Care Instructions. \"  Current as of: October 3, 2018  Content Version: 12.1  © 6150-7862 [employer]. Care instructions adapted under license by [employer] (which disclaims liability or warranty for this information). If you have questions about a medical condition or this instruction, always ask your healthcare professional. [address] any warranty or liability for your use of this information.

## 2019-08-24 NOTE — ED PROVIDER NOTES
EMERGENCY DEPARTMENT HISTORY AND PHYSICAL EXAM      Date: 2019  Patient Name: Kvng Paula    History of Presenting Illness     Chief Complaint   Patient presents with    Dental Pain     right front since yesterday       History Provided By: Patient    HPI: Kvng Paula, 37 y.o. female with PMHx significant for compliance, diabetes, presents ambulatory to the ED with cc of left front tooth pain since yesterday. Patient states she was eating and her tooth \"broke again. \"  Patient states she broke the tooth months ago but she never followed up with the dentist.  She has not taken any medication prior to arrival for her pain. She denies any fevers, facial swelling, sore throat, voice changes. Pt is not an active tobacco user. PCP: Param Greenfield NP    There are no other complaints, changes, or physical findings at this time. Current Outpatient Medications   Medication Sig Dispense Refill    clindamycin (CLEOCIN) 300 mg capsule Take 1 Cap by mouth four (4) times daily for 7 days. 28 Cap 0    lidocaine (LIDOCAINE VISCOUS) 2 % solution Take 15 mL by mouth as needed for Pain. 1 Bottle 0    diflunisal (DOLOBID) 500 mg tab Take 1 Tab by mouth two (2) times a day. 20 Tab 0    metFORMIN (GLUCOPHAGE) 1,000 mg tablet Take 1,000 mg by mouth two (2) times daily (with meals).          Past History     Past Medical History:  Past Medical History:   Diagnosis Date    Abnormal Pap smear     abnormal pap at 15 y/o normal since     Back pain 2015    [facility]  emergency [facility])     Diabetes mellitus     Gestational Diabetes with last two pregnancies-diet controlled       Past Surgical History:  Past Surgical History:   Procedure Laterality Date    HX  SECTION      HX CHOLECYSTECTOMY      HX OTHER SURGICAL      cholecystectomy        Family History:  Family History   Problem Relation Age of Onset    Diabetes Father     Hypertension Father     Stroke Father     Kidney Disease Father        Social History:  Social History     Tobacco Use    Smoking status: Never Smoker    Smokeless tobacco: Never Used   Substance Use Topics    Alcohol use: Yes     Comment: Socially    Drug use: No       Allergies: Allergies   Allergen Reactions    Amoxicillin Rash    Tramadol Hives         Review of Systems   Review of Systems   Constitutional: Negative. Negative for activity change, appetite change, chills and fever. HENT: Positive for dental problem. Negative for rhinorrhea, sore throat, trouble swallowing and voice change. Eyes: Negative for pain and visual disturbance. Respiratory: Negative for shortness of breath. Cardiovascular: Negative for chest pain. Gastrointestinal: Negative for abdominal pain, nausea and vomiting. Musculoskeletal: Negative for arthralgias and myalgias. Skin: Negative for color change, rash and wound. Neurological: Negative for dizziness, numbness and headaches. All other systems reviewed and are negative. Physical Exam   Physical Exam   Constitutional: She is oriented to person, place, and time. She appears well-developed and well-nourished. No distress. 37 y.o.  female in NAD  Communicates appropriately and in full sentences   HENT:   Head: Normocephalic and atraumatic. Cracked and broken front left tooth. Diffuse dental decay with dental caries. No abscess along the gumline. Uvula is midline. Communicating full sentences. No Evidence of mastoiditis, RPA, PTA. Eyes: Pupils are equal, round, and reactive to light. Conjunctivae are normal. Right eye exhibits no discharge. Left eye exhibits no discharge. Neck: Normal range of motion. Neck supple. No nuchal rigidity or meningeal signs   Pulmonary/Chest: Effort normal. No respiratory distress. Musculoskeletal: Normal range of motion. She exhibits no edema, tenderness or deformity. Neurological: She is alert and oriented to person, place, and time.    Skin: Skin is warm and dry. She is not diaphoretic. No erythema. Psychiatric:   Patient on her cellular device but then instantly starts moaning when this provider enters the room. Nursing note and vitals reviewed. Diagnostic Study Results     No formal testing initiated. Medical Decision Making   I am the first provider for this patient. I reviewed the vital signs, available nursing notes, past medical history, past surgical history, family history and social history. Vital Signs-Reviewed the patient's vital signs. Patient Vitals for the past 12 hrs:   Temp Pulse Resp BP SpO2   08/24/19 0809 97.6 °F (36.4 °C) 87 16 141/88 100 %         Records Reviewed: Nursing Notes and Old Medical Records    Provider Notes (Medical Decision Making):   DDx: sialadenitis, dental caries, fractured tooth, poor compliance with regular dental follow-up, lack of dental insurance, trigeminal neuralgia, retropharyngeal abscess, Luz Brownie    Patient presents with dental pain without facial swelling. No obvious abscess that needs drainage. No red flags that make PTA, RPA, ludwigs angina concerning. Pt's vital signs have been stable while in the [address] [facility] ED. Will tx with antibiotics and outpatient analgesics. Given information on dentists and importance of followup and no smoking. Pt expresses that they will be compliant with the discussed plan. ED Course:   Initial assessment performed. The patients presenting problems have been discussed, and they are in agreement with the care plan formulated and outlined with them. I have encouraged them to ask questions as they arise throughout their visit. DISCHARGE NOTE:  Fabián Caicedo  results have been reviewed with her. She has been counseled regarding her diagnosis.   She verbally conveys understanding and agreement of the signs, symptoms, diagnosis, treatment and prognosis and additionally agrees to follow up as recommended with Dr. Ritu Meyer, [place] in 24 - 50 hours.  She also agrees with the care-plan and conveys that all of her questions have been answered. I have also put together some discharge instructions for her that include: 1) educational information regarding their diagnosis, 2) how to care for their diagnosis at home, as well a 3) list of reasons why they would want to return to the ED prior to their follow-up appointment, should their condition change. She and/or family's questions have been answered. I have encouraged them to see the official results in [facility]\" or to retrieve the specifics of their results from medical records. PLAN:  1. Return precautions as discussed  2. Follow-up with providers as directed  3. Medications as prescribed    Return to ED if worse     Diagnosis     Clinical Impression:   1. Dentalgia    2. Atypical facial pain    3. Poor dentition        Discharge Medication List as of 8/24/2019  8:43 AM      START taking these medications    Details   clindamycin (CLEOCIN) 300 mg capsule Take 1 Cap by mouth four (4) times daily for 7 days. , Normal, Disp-28 Cap, R-0      lidocaine (LIDOCAINE VISCOUS) 2 % solution Take 15 mL by mouth as needed for Pain., Normal, Disp-1 Bottle, R-0      diflunisal (DOLOBID) 500 mg tab Take 1 Tab by mouth two (2) times a day., Normal, Disp-20 Tab, R-0         CONTINUE these medications which have NOT CHANGED    Details   metFORMIN (GLUCOPHAGE) 1,000 mg tablet Take 1,000 mg by mouth two (2) times daily (with meals). , Historical Med             Follow-up Information     Follow up With Specialties Details Why Contact Info    Elizabeth Hayden NP Nurse Practitioner Schedule an appointment as soon as possible for a visit in 2 days If symptoms worsen [address]                 This note will not be viewable in [address]

## 2019-08-25 ENCOUNTER — HOSPITAL ENCOUNTER (EMERGENCY)
Age: 34
Discharge: HOME | End: 2019-08-25
Attending: EMERGENCY MEDICINE
Payer: SELF-PAY

## 2019-08-25 VITALS
DIASTOLIC BLOOD PRESSURE: 117 MMHG | BODY MASS INDEX: 33.61 KG/M2 | WEIGHT: 201.72 LBS | HEIGHT: 65 IN | SYSTOLIC BLOOD PRESSURE: 165 MMHG | OXYGEN SATURATION: 100 % | TEMPERATURE: 98.7 F | RESPIRATION RATE: 16 BRPM | HEART RATE: 108 BPM

## 2019-08-25 DIAGNOSIS — K04.7 DENTAL ABSCESS: Primary | ICD-10-CM

## 2019-08-25 DIAGNOSIS — R03.0 ELEVATED BLOOD PRESSURE READING: ICD-10-CM

## 2019-08-25 PROCEDURE — 74011250636 HC RX REV CODE- 250/636: Performed by: PHYSICIAN ASSISTANT

## 2019-08-25 PROCEDURE — 99282 EMERGENCY DEPT VISIT SF MDM: CPT

## 2019-08-25 PROCEDURE — 96372 THER/PROPH/DIAG INJ SC/IM: CPT

## 2019-08-25 RX ORDER — KETOROLAC TROMETHAMINE 30 MG/ML
30 INJECTION, SOLUTION INTRAMUSCULAR; INTRAVENOUS
Status: COMPLETED | OUTPATIENT
Start: 2019-08-25 | End: 2019-08-25

## 2019-08-25 RX ORDER — OXYCODONE AND ACETAMINOPHEN 5; 325 MG/1; MG/1
1 TABLET ORAL
Qty: 15 TAB | Refills: 0 | Status: SHIPPED | OUTPATIENT
Start: 2019-08-25 | End: 2019-08-28

## 2019-08-25 RX ORDER — IBUPROFEN 800 MG/1
800 TABLET ORAL
Qty: 20 TAB | Refills: 0 | Status: SHIPPED | OUTPATIENT
Start: 2019-08-25 | End: 2019-09-01

## 2019-08-25 RX ADMIN — KETOROLAC TROMETHAMINE 30 MG: 30 INJECTION, SOLUTION INTRAMUSCULAR at 18:18

## 2019-08-25 NOTE — ED PROVIDER NOTES
EMERGENCY DEPARTMENT HISTORY AND PHYSICAL EXAM      Date: 8/25/2019  Patient Name: Kamron Pump    History of Presenting Illness     HPI: Kamron Pump is a 37 y.o. female with past medical history of diabetes presents to the emergency room for dental pain. Patient was seen yesterday and was prescribed lidocaine but says she was unable to afford this due to insurance reasons. She says she did get her clindamycin filled and is taking that. Patient reports her pain is a 10 out of 10, constant, throbbing, nonradiating pain. She denies trismus, fever/chills, inability to tolerate p.o., drooling, change in voice, among other associated symptoms. Pertinent social history: None    Pertinent surgical history: None    PCP: Stacey Joe NP    Current Facility-Administered Medications   Medication Dose Route Frequency Provider Last Rate Last Dose    dental ball (lidocaine/Benadryl/Cetacaine) mixture   Mucous Membrane ONCE [facility] [place]         Current Outpatient Medications   Medication Sig Dispense Refill    oxyCODONE-acetaminophen (PERCOCET) 5-325 mg per tablet Take 1 Tab by mouth every eight (8) hours as needed for Pain for up to 3 days. Max Daily Amount: 3 Tabs. Indications: pain 15 Tab 0    ibuprofen (MOTRIN) 800 mg tablet Take 1 Tab by mouth every six (6) hours as needed for Pain for up to 7 days. 20 Tab 0    clindamycin (CLEOCIN) 300 mg capsule Take 1 Cap by mouth four (4) times daily for 7 days. 28 Cap 0    lidocaine (LIDOCAINE VISCOUS) 2 % solution Take 15 mL by mouth as needed for Pain. 1 Bottle 0    diflunisal (DOLOBID) 500 mg tab Take 1 Tab by mouth two (2) times a day. 20 Tab 0    metFORMIN (GLUCOPHAGE) 1,000 mg tablet Take 1,000 mg by mouth two (2) times daily (with meals).          Past History     Past Medical History:  Past Medical History:   Diagnosis Date    Abnormal Pap smear     abnormal pap at 13 y/o normal since     Back pain 2015 feb    [facility]  emergency [facility]    Diabetes ([facility] [address]     Diabetes mellitus     Gestational Diabetes with last two pregnancies-diet controlled       Past Surgical History:  Past Surgical History:   Procedure Laterality Date    HX  SECTION      HX CHOLECYSTECTOMY      HX OTHER SURGICAL      cholecystectomy 2002       Family History:  Family History   Problem Relation Age of Onset    Diabetes Father     Hypertension Father     Stroke Father     Kidney Disease Father        Social History:  Social History     Tobacco Use    Smoking status: Never Smoker    Smokeless tobacco: Never Used   Substance Use Topics    Alcohol use: Yes     Comment: Socially    Drug use: No       Allergies: Allergies   Allergen Reactions    Amoxicillin Rash    Tramadol Hives         Review of Systems   Review of Systems   Constitutional: Negative for chills and fever. HENT: Positive for dental problem. Negative for drooling, ear discharge, ear pain, sore throat and voice change. Respiratory: Negative for shortness of breath. Cardiovascular: Negative for chest pain. Gastrointestinal: Negative for abdominal pain, diarrhea, nausea and vomiting. Genitourinary:        Denies pregnancy   Skin: Negative for rash and wound. Neurological: Negative for light-headedness and headaches. Physical Exam     Vitals:    19 1740   BP: (!) 165/117   Pulse: (!) 108   Resp: 16   Temp: 98.7 °F (37.1 °C)   SpO2: 100%   Weight: 91.5 kg (201 lb 11.5 oz)   Height: 5' 5\" (1.651 m)     Physical Exam   Constitutional: She is oriented to person, place, and time. She appears well-developed and well-nourished. No distress. Patient is tearful, holding mouth, appropriately dressed   HENT:   Head: Normocephalic and atraumatic. Right Ear: External ear normal.   Left Ear: External ear normal.   Mouth/Throat: Uvula is midline, oropharynx is clear and moist and mucous membranes are normal. No oral lesions. No trismus in the jaw. Abnormal dentition.  Dental abscesses and dental caries present. No uvula swelling. No oropharyngeal exudate. TM's normal bilaterally   Neck: Normal range of motion. Neck supple. Cardiovascular: Normal rate, regular rhythm, normal heart sounds and intact distal pulses. Pulmonary/Chest: Effort normal and breath sounds normal. No respiratory distress. She has no wheezes. Musculoskeletal: She exhibits no edema. Lymphadenopathy:     She has no cervical adenopathy. Neurological: She is alert and oriented to person, place, and time. Skin: Skin is warm and dry. No rash noted. She is not diaphoretic. No erythema. No pallor. Psychiatric: She has a normal mood and affect. Her behavior is normal. Judgment and thought content normal.   Nursing note and vitals reviewed. Diagnostic Study Results     Labs -   No results found for this or any previous visit (from the past 12 hour(s)). Radiologic Studies -   No orders to display     CT Results  (Last 48 hours)    None        CXR Results  (Last 48 hours)    None            Medical Decision Making   I am the first provider for this patient. I reviewed the vital signs, available nursing notes, past medical history, past surgical history, social history    Pulse Oximetry Analysis - 100% on RA      ED Course and Progress notes:   Initial assessment performed. The patients presenting problems have been discussed, and they are in agreement with the care plan formulated and outlined with them. I have encouraged them to ask questions as they arise throughout their visit. On re evaluation pt is resting comfortably, and has no new complaints, changes, or physical findings. The patient has improved and is stable. Procedures:  Procedures    Critical Care Time: none    Vital Signs-Reviewed the patient's vital signs.   Vitals:    08/25/19 1740   BP: (!) 165/117   BP 1 Location: Right arm   BP Patient Position: Sitting   Pulse: (!) 108   Resp: 16   Temp: 98.7 °F (37.1 °C)   SpO2: 100%   Weight: 91.5 kg (201 lb 11.5 oz)   Height: 5' 5\" (1.651 m)       Medications Administered During ED Course  Medications   dental ball (lidocaine/Benadryl/Cetacaine) mixture (has no administration in time range)   ketorolac (TORADOL) injection 30 mg (30 mg IntraMUSCular Given 8/25/19 1818)       HYPERTENSION COUNSELING  Patient denies chest pain, headache, shortness of breath,  sx's, abd pain. Patient is made aware of their elevated blood pressure and is instructed to follow up this week with their Primary Care for a recheck. Patient is counseled regarding consequences of chronic, uncontrolled hypertension including kidney disease, heart disease, stroke or even death. Patient states their understanding and agrees to follow up this week. Disposition:  D/c home    DISCHARGE NOTE:   I Counseled the patient on diagnosis and care plan. All available lab and imaging results have been reviewed by me and were discussed with the patient, including all incidental findings. The likelihood of other entities in the differential is insufficient to justify any further testing for them. This was explained to the patient. Patient agrees with plan and agrees to follow up with PCP as recommended, or return to the ED immediately if their symptoms worsen. All medications were reviewed with the patient. All of pt's questions and concerns were addressed. The patient was advised that new or worsening symptoms would require further evaluation and should prompt immediate return to the Emergency Department. Discharge instructions have been provided and explained to the patient, along with reasons to return to the ED. Patient voices understanding and is agreeable with the plan for discharge. Patient is ready to go home.     Follow-up Information     Follow up With Specialties Details Why Contact Info    Debbie Cooper NP Nurse Practitioner Schedule an appointment as soon as possible for a visit For elevated blood pressure reading [address] [facility] [address]  [place] [address]  [phone]      JAMAAL ESPINAL [facility]  Schedule an appointment as soon as possible for a visit  [address] [place]  [phone]    [facility] EMERGENCY DEPT Emergency Medicine Go to If symptoms worsen [address] [place] [address]  [phone]          Discharge Medication List as of 8/25/2019  6:16 PM      START taking these medications    Details   oxyCODONE-acetaminophen (PERCOCET) 5-325 mg per tablet Take 1 Tab by mouth every eight (8) hours as needed for Pain for up to 3 days. Max Daily Amount: 3 Tabs. Indications: pain, Print, Disp-15 Tab, R-0      ibuprofen (MOTRIN) 800 mg tablet Take 1 Tab by mouth every six (6) hours as needed for Pain for up to 7 days. , Print, Disp-20 Tab, R-0         CONTINUE these medications which have NOT CHANGED    Details   clindamycin (CLEOCIN) 300 mg capsule Take 1 Cap by mouth four (4) times daily for 7 days. , Normal, Disp-28 Cap, R-0      lidocaine (LIDOCAINE VISCOUS) 2 % solution Take 15 mL by mouth as needed for Pain., Normal, Disp-1 Bottle, R-0      diflunisal (DOLOBID) 500 mg tab Take 1 Tab by mouth two (2) times a day., Normal, Disp-20 Tab, R-0      metFORMIN (GLUCOPHAGE) 1,000 mg tablet Take 1,000 mg by mouth two (2) times daily (with meals). , Historical Med             Provider Notes (Medical Decision Making):   DDX: gingivitis, abscess, poor oral hygiene, fractured tooth, dentalgia, No red flags that make peritonsillar/retropharyngeal abscess, ludwigs angina concerning. Diagnosis     Clinical Impression:   1. Dental abscess    2. Elevated blood pressure reading        Please note that this dictation was completed with [employer] the [employer] voice recognition software. Quite often unanticipated grammatical, syntax, homophones, and other interpretive errors are inadvertently transcribed by the computer software. Please disregard these errors.   Please excuse any errors that have escaped final proofreading. This note will not be viewable in [address]

## 2019-08-25 NOTE — LETTER
[address] 
[facility] EMERGENCY DEPT 
[address] [place] [id number] [phone] Work/School Note Date: 8/25/2019 To Whom It May concern: 
 
Carolina Alberts was seen and treated today in the emergency room by the following provider(s): 
Attending Provider: Glenna Martell MD 
Physician Assistant: Brady Stewart. Carolina Alberts may return to work in 2 days Sincerely, 
 
 
 
 
ANDREW Glover

## 2019-08-25 NOTE — DISCHARGE INSTRUCTIONS
Elevated Blood Pressure: Care Instructions  Your Care Instructions    Blood pressure is a measure of how hard the blood pushes against the walls of your arteries. It's normal for blood pressure to go up and down throughout the day. But if it stays up over time, you have high blood pressure. Two numbers tell you your blood pressure. The first number is the systolic pressure. It shows how hard the blood pushes when your heart is pumping. The second number is the diastolic pressure. It shows how hard the blood pushes between heartbeats, when your heart is relaxed and filling with blood. An ideal blood pressure in adults is less than 120/80 (say \"120 over 80\"). High blood pressure is 140/90 or higher. You have high blood pressure if your top number is 140 or higher or your bottom number is 90 or higher, or both. The main test for high blood pressure is simple, fast, and painless. To diagnose high blood pressure, your doctor will test your blood pressure at different times. After testing your blood pressure, your doctor may ask you to test it again when you are home. If you are diagnosed with high blood pressure, you can work with your doctor to make a long-term plan to manage it. Follow-up care is a key part of your treatment and safety. Be sure to make and go to all appointments, and call your doctor if you are having problems. It's also a good idea to know your test results and keep a list of the medicines you take. How can you care for yourself at home? · Do not smoke. Smoking increases your risk for heart attack and stroke. If you need help quitting, talk to your doctor about stop-smoking programs and medicines. These can increase your chances of quitting for good. · Stay at a healthy weight. · Try to limit how much sodium you eat to less than 2,300 milligrams (mg) a day. Your doctor may ask you to try to eat less than 1,500 mg a day. · Be physically active.  Get at least 30 minutes of exercise on most days of the week. Walking is a good choice. You also may want to do other activities, such as running, swimming, cycling, or playing tennis or team sports. · Avoid or limit alcohol. Talk to your doctor about whether you can drink any alcohol. · Eat plenty of fruits, vegetables, and low-fat dairy products. Eat less saturated and total fats. · Learn how to check your blood pressure at home. When should you call for help? Call your doctor now or seek immediate medical care if:  ? · Your blood pressure is much higher than normal (such as 180/110 or higher). ? · You think high blood pressure is causing symptoms such as:  ¨ Severe headache. ¨ Blurry vision. ? Watch closely for changes in your health, and be sure to contact your doctor if:  ? · You do not get better as expected. Where can you learn more? Go to http://[employer] Internetsloan.info/. Enter S102 in the search box to learn more about \"Elevated Blood Pressure: Care Instructions. \"  Current as of: September 21, 2016  Content Version: 11.4  © 8422-2284 [employer]. Care instructions adapted under license by [employer] (which disclaims liability or warranty for this information). If you have questions about a medical condition or this instruction, always ask your healthcare professional. [address] any warranty or liability for your use of this information. Patient Education        Abscessed Tooth: Care Instructions  Your Care Instructions    An abscessed tooth is a tooth that has a pocket of pus in the tissues around it. Pus forms when the body tries to fight an infection caused by bacteria. If the pus cannot drain, it forms an abscess. An abscessed tooth can cause red, swollen gums and throbbing pain, especially when you chew. You may have a bad taste in your mouth and a fever, and your jaw may swell. Damage to the tooth, untreated tooth decay, or gum disease can cause an abscessed tooth.   An abscessed tooth needs to be treated by a dental professional right away. If it is not treated, the infection could spread to other parts of your body. Your dentist will give you antibiotics to stop the infection. He or she may make a hole in the tooth or cut open (anshul) the abscess inside your mouth so that the infection can drain, which should relieve your pain. You may need to have a root canal treatment, which tries to save your tooth by taking out the infected pulp and replacing it with a healing medicine and/or a filling. If these treatments do not work, your tooth may have to be removed. Follow-up care is a key part of your treatment and safety. Be sure to make and go to all appointments, and call your doctor if you are having problems. It's also a good idea to know your test results and keep a list of the medicines you take. How can you care for yourself at home? · Reduce pain and swelling in your face and jaw by putting ice or a cold pack on the outside of your cheek for 10 to 20 minutes at a time. Put a thin cloth between the ice and your skin. · Take pain medicines exactly as directed. ? If the doctor gave you a prescription medicine for pain, take it as prescribed. ? If you are not taking a prescription pain medicine, ask your doctor if you can take an over-the-counter medicine. · Take your antibiotics as directed. Do not stop taking them just because you feel better. You need to take the full course of antibiotics. To prevent tooth abscess  · Brush and floss every day, and have regular dental checkups. · Eat a healthy diet, and avoid sugary foods and drinks. · Do not smoke, use e-cigarettes with nicotine, or use spit tobacco. Tobacco and nicotine slow your ability to heal. Tobacco also increases your risk for gum disease and cancer of the mouth and throat. If you need help quitting, talk to your doctor about stop-smoking programs and medicines.  These can increase your chances of quitting for good.  When should you call for help? Call 911 anytime you think you may need emergency care. For example, call if:    · You have trouble breathing.    Call your doctor now or seek immediate medical care if:    · You have new or worse symptoms of infection, such as:  ? Increased pain, swelling, warmth, or redness. ? Red streaks leading from the area. ? Pus draining from the area. ? A fever.    Watch closely for changes in your health, and be sure to contact your doctor if:    · You do not get better as expected. Where can you learn more? Go to http://asael-sloan.info/. Enter B416 in the search box to learn more about \"Abscessed Tooth: Care Instructions. \"  Current as of: October 3, 2018  Content Version: 12.1  © 4012-3317 [employer]. Care instructions adapted under license by [employer] (which disclaims liability or warranty for this information). If you have questions about a medical condition or this instruction, always ask your healthcare professional. [address] any warranty or liability for your use of this information.

## 2019-08-25 NOTE — ED NOTES
Patient discharged by [facility]. Patient provided with discharge instructions Rx and instructions on follow up care. Patient out of ED ambulatory accompanied by self.

## 2019-08-25 NOTE — ED NOTES
Pt. Presents to ED today for complaints of frontal/upper tooth pain. Patient seen yesterday for the same complaint. Pt. Using a pad of paper to communicate however patient is able to speak and open mouth. Pt. Placed in position of comfort with call bell in reach.

## 2019-09-13 ENCOUNTER — HOSPITAL ENCOUNTER (EMERGENCY)
Age: 34
Discharge: HOME | End: 2019-09-13
Attending: EMERGENCY MEDICINE
Payer: SELF-PAY

## 2019-09-13 VITALS
OXYGEN SATURATION: 100 % | RESPIRATION RATE: 18 BRPM | BODY MASS INDEX: 33.32 KG/M2 | WEIGHT: 200 LBS | HEIGHT: 65 IN | TEMPERATURE: 98.9 F | DIASTOLIC BLOOD PRESSURE: 91 MMHG | SYSTOLIC BLOOD PRESSURE: 169 MMHG | HEART RATE: 100 BPM

## 2019-09-13 DIAGNOSIS — K08.89 PAIN, DENTAL: Primary | ICD-10-CM

## 2019-09-13 PROCEDURE — 99283 EMERGENCY DEPT VISIT LOW MDM: CPT

## 2019-09-13 PROCEDURE — 74011250637 HC RX REV CODE- 250/637: Performed by: NURSE PRACTITIONER

## 2019-09-13 RX ORDER — OXYCODONE AND ACETAMINOPHEN 5; 325 MG/1; MG/1
1 TABLET ORAL
Status: DISCONTINUED | OUTPATIENT
Start: 2019-09-13 | End: 2019-09-13

## 2019-09-13 RX ORDER — KETOROLAC TROMETHAMINE 10 MG/1
10 TABLET, FILM COATED ORAL
Qty: 15 TAB | Refills: 0 | Status: SHIPPED | OUTPATIENT
Start: 2019-09-13 | End: 2019-09-23

## 2019-09-13 RX ORDER — OXYCODONE AND ACETAMINOPHEN 5; 325 MG/1; MG/1
2 TABLET ORAL
Status: COMPLETED | OUTPATIENT
Start: 2019-09-13 | End: 2019-09-13

## 2019-09-13 RX ORDER — CLINDAMYCIN HYDROCHLORIDE 300 MG/1
300 CAPSULE ORAL 4 TIMES DAILY
Qty: 28 CAP | Refills: 0 | Status: SHIPPED | OUTPATIENT
Start: 2019-09-13 | End: 2019-09-20

## 2019-09-13 RX ADMIN — OXYCODONE HYDROCHLORIDE AND ACETAMINOPHEN 2 TABLET: 5; 325 TABLET ORAL at 17:19

## 2019-09-13 NOTE — ED PROVIDER NOTES
EMERGENCY DEPARTMENT HISTORY AND PHYSICAL EXAM      Date: 9/13/2019  Patient Name: Kala Wu    History of Presenting Illness     Chief Complaint   Patient presents with    Dental Pain     Pt ambulatory to triage with c/o upper front tooth pain x 1 month; pt was seen a few weeks ago for same and has appt with dentist to remove, however states she is leaving for vacation in am and \"need something for immediate pain relief\"; denies abscess, drainage, fever, chills        History Provided By: Patient    HPI: Kala Wu, 37 y.o. female presents by POV to the ED with cc of right upper quadrant tooth pain for approximately 1 month. Patient states she has been back and forth to see the dentist to have a tooth removed however, she states she is the tooth in the front is cracked and she has not had time to get back into see the dentist for this particular tooth. Patient states the pain has become unbearable and she is going out of town in the morning. Patient states she will get back into see the dentist as soon as she gets back however, states she is in excruciating pain. There are no other complaints, changes, or physical findings at this time. PCP: Lela Ramirez NP    No current facility-administered medications on file prior to encounter. Current Outpatient Medications on File Prior to Encounter   Medication Sig Dispense Refill    metFORMIN (GLUCOPHAGE) 1,000 mg tablet Take 1,000 mg by mouth two (2) times daily (with meals).  lidocaine (LIDOCAINE VISCOUS) 2 % solution Take 15 mL by mouth as needed for Pain. 1 Bottle 0    diflunisal (DOLOBID) 500 mg tab Take 1 Tab by mouth two (2) times a day.  20 Tab 0       Past History     Past Medical History:  Past Medical History:   Diagnosis Date    Abnormal Pap smear     abnormal pap at 13 y/o normal since     Back pain 2015 feb    [facility]  emergency [facility]     Diabetes mellitus     Gestational Diabetes with last two pregnancies-diet controlled       Past Surgical History:  Past Surgical History:   Procedure Laterality Date    HX  SECTION      HX CHOLECYSTECTOMY      HX OTHER SURGICAL      cholecystectomy 2002       Family History:  Family History   Problem Relation Age of Onset    Diabetes Father     Hypertension Father     Stroke Father     Kidney Disease Father        Social History:  Social History     Tobacco Use    Smoking status: Never Smoker    Smokeless tobacco: Never Used   Substance Use Topics    Alcohol use: Yes     Comment: Socially    Drug use: No       Allergies: Allergies   Allergen Reactions    Amoxicillin Rash    Tramadol Hives         Review of Systems   Review of Systems   Constitutional: Negative for chills and fever. HENT: Positive for dental problem. Negative for congestion, rhinorrhea and sore throat. Respiratory: Negative for cough and shortness of breath. Cardiovascular: Negative for chest pain. Gastrointestinal: Negative for abdominal pain, nausea and vomiting. Endocrine: Negative for polyuria. Genitourinary: Negative for dysuria and frequency. Skin: Negative for rash. Neurological: Negative for dizziness, weakness and headaches. Psychiatric/Behavioral: The patient is nervous/anxious. All other systems reviewed and are negative. Physical Exam   Physical Exam   Constitutional: She is oriented to person, place, and time. She appears well-developed and well-nourished. No distress. 37 y.o. female   HENT:   Head: Normocephalic and atraumatic. Right Ear: External ear normal.   Left Ear: External ear normal.   Nose: Nose normal.   Mouth/Throat: Oropharynx is clear and moist. Abnormal dentition. Dental caries present. No dental abscesses or lacerations. No oropharyngeal exudate, posterior oropharyngeal edema, posterior oropharyngeal erythema or tonsillar abscesses. Eyes: Pupils are equal, round, and reactive to light.  Conjunctivae are normal. Right eye exhibits no discharge. Left eye exhibits no discharge. No scleral icterus. Neck: Normal range of motion. Neck supple. No JVD present. No tracheal deviation present. No thyromegaly present. Cardiovascular: Normal rate, regular rhythm and normal heart sounds. No murmur heard. Pulmonary/Chest: Effort normal and breath sounds normal. No stridor. No respiratory distress. She has no wheezes. Abdominal: Soft. Bowel sounds are normal. She exhibits no distension. There is no tenderness. There is no rebound and no guarding. Musculoskeletal: Normal range of motion. She exhibits no edema, tenderness or deformity. Lymphadenopathy:     She has no cervical adenopathy. Neurological: She is alert and oriented to person, place, and time. No cranial nerve deficit. Coordination normal.   Skin: Skin is warm and dry. She is not diaphoretic. Psychiatric: She has a normal mood and affect. Her behavior is normal.   Nursing note and vitals reviewed. Diagnostic Study Results     Labs -   No results found for this or any previous visit (from the past 12 hour(s)). Radiologic Studies -   No orders to display       Medical Decision Making   I am the first provider for this patient. I reviewed the vital signs, available nursing notes, past medical history, past surgical history, family history and social history. Vital Signs-Reviewed the patient's vital signs. Patient Vitals for the past 12 hrs:   Temp Pulse Resp BP SpO2   09/13/19 1638 98.9 °F (37.2 °C) 100 18 (!) 169/91 100 %       Records Reviewed: Nursing Notes and Old Medical Records    Provider Notes (Medical Decision Making):   Differential diagnoses include dental caries, tooth avulsion, dental abscess. ED Course:   Initial assessment performed. The patients presenting problems have been discussed, and they are in agreement with the care plan formulated and outlined with them.   I have encouraged them to ask questions as they arise throughout their visit. Advised patient to begin taking the antibiotic and ketorolac pain medication to help reduce pain and to follow-up with her dentist as soon as possible. Patient states she is going on vacation tomorrow with her family and would see a dentist as soon as she returns. Advised patient that if she is on vacation and the pain increases that she should go to the nearest emergency room for further evaluation of the tooth pain. Also advised patient to consider getting all dental work and primary care at the same place. Patient's  aware [place] score was 610. Critical Care Time: None    Disposition:  DISCHARGE NOTE:  5:28 PM  The pt is ready for discharge. The pt's signs, symptoms, diagnosis, and discharge instructions have been discussed and pt has conveyed their understanding. The pt is to follow up as recommended or return to ER should their symptoms worsen. Plan has been discussed and pt is in agreement. Nadia Felipe NP  9/13/2019      PLAN:  1. Current Discharge Medication List        2. Follow-up Information    None       Return to ED if worse     Diagnosis     Clinical Impression: No diagnosis found. Please note that this dictation was completed with [employer], the computer voice recognition software. Quite often unanticipated grammatical, syntax, homophones, and other interpretive errors are inadvertently transcribed by the computer software. Please disregards these errors. Please excuse any errors that have escaped final proofreading. This note will not be viewable in [address]

## 2019-09-13 NOTE — DISCHARGE INSTRUCTIONS
Patient Education        Periodontal Conditions: Care Instructions  Your Care Instructions    Periodontal conditions affect the gums, bone, and tissue that surround and support the teeth. The most common problems are caused by plaque. Plaque is a thin film of bacteria that sticks to teeth above and below the gum line. It can build up and harden into tartar. The bacteria in plaque and tartar can cause gum disease. Gingivitis is a disease that affects the gums (gingiva). The gums are the soft tissue that surrounds the teeth. Gingivitis causes red, swollen, tender gums that bleed easily when brushed, persistent bad breath, and sensitive teeth. Because it is not painful, many people do not get treatment when they should. Gingivitis can be reversed with good dental care. Periodontitis is a more advanced disease that affects more than the gums. The gums pull away from the teeth. This leaves deep pockets where bacteria can grow. The disease can damage the bones that support the teeth. The teeth may get loose and fall out. A periodontal condition should be treated as soon as it is found. Finding gum problems early, treating them right away, and having regular checkups bring the best results. You can treat mild periodontal conditions by brushing and flossing your teeth every day. Your dentist may prescribe a mouthwash to kill the bacteria that can damage teeth and gums. Your dentist may have you take antibiotics to treat infection from moderate periodontal disease. If your gums have pulled away from your teeth, you may need cleaning between the teeth and gums right down to the teeth roots. This is called root planing and scaling. If you have severe periodontal disease, you may need surgery to remove diseased gum tissue or repair bone damage. Follow-up care is a key part of your treatment and safety. Be sure to make and go to all appointments, and call your dentist if you are having problems.  It's also a good idea to know your test results and keep a list of the medicines you take. How can you care for yourself at home? · If your dentist prescribed antibiotics, take them as directed. Do not stop taking them just because you feel better. You need to take the full course of antibiotics. · Brush your teeth twice a day, in the morning and at night. ? Use a toothbrush with soft, rounded-end bristles and a head that is small enough to reach all parts of your teeth and mouth. Replace your toothbrush every 3 to 4 months. ? Use a fluoride toothpaste. ? Place the brush at a 45-degree angle where the teeth meet the gums. Press firmly, and gently rock the brush back and forth using small circular movements. ? Brush chewing surfaces vigorously with short back-and-forth strokes. ? Brush your tongue from back to front. · Floss at least once a day. Choose the type and flavor that you like best.  · Have your teeth cleaned by a professional at least twice a year. · Ask your dentist about using an antibacterial mouthwash to help reduce bacteria. · Rinse your mouth with water or chew sugar-free gum after meals if you can't brush your teeth. · Do not smoke or use smokeless tobacco. Tobacco use can cause periodontal disease. When should you call for help? Call your dentist now or seek immediate medical care if:    · You have symptoms of infection, such as:  ? Increased pain, swelling, warmth, or redness. ? Red streaks leading from the area. ? Pus draining from the area. ? A fever.    Watch closely for changes in your health, and be sure to contact your dentist if:    · You have new or worse tooth pain.     · You do not get better as expected. Where can you learn more? Go to http://asael-sloan.info/. Enter O116 in the search box to learn more about \"Periodontal Conditions: Care Instructions. \"  Current as of: October 3, 2018  Content Version: 12.1  © 7586-1481 [employer].  Care instructions adapted under license by [employer] (which disclaims liability or warranty for this information). If you have questions about a medical condition or this instruction, always ask your healthcare professional. [place] [address] any warranty or liability for your use of this information. Patient Education        Tooth and Gum Pain in Children: Care Instructions  Your Care Instructions    The most common causes of dental pain are tooth decay and gum disease. Pain can also be caused by an infection of the tooth (abscess) or the gums. Or your child may have a broken or cracked tooth. Other causes of pain include infection and damage to a tooth from grinding the teeth. A tooth that is coming in but cannot break through the gum can cause pain. Prompt dental care can help find the cause of your child's toothache and keep the tooth from dying or gum disease from getting worse. Care at home may reduce your child's pain and discomfort. Follow-up care is a key part of your child's treatment and safety. Be sure to make and go to all appointments, and call your dentist or doctor if your child is having problems. It's also a good idea to know your child's test results and keep a list of the medicines your child takes. How can you care for your child at home? · To reduce pain and facial swelling, put ice or a cold pack on the outside of your child's cheek for 10 to 20 minutes at a time. Put a thin cloth between the ice and your child's skin. Do not use heat. · If the doctor prescribed antibiotics for your child, give them as directed. Do not stop using them just because your child feels better. Your child needs to take the full course of antibiotics. · Give your child anti-inflammatory medicines such as ibuprofen (Advil, Motrin) to reduce pain and swelling. Be safe with medicines. Read and follow all instructions on the label.   · Do not give your child very hot, cold, or sweet foods and drinks if they increase pain. · Rinse your child's mouth with warm salt water every 2 hours to help relieve pain and swelling. Older children (starting around age 6) can do this by themselves. Mix 1 teaspoon of salt in 8 ounces of water. · Talk to your dentist about your child using special toothpaste for sensitive teeth. To reduce pain on contact with heat or cold or when brushing, have your child brush with this toothpaste regularly or rub a small amount of the paste on the sensitive area with a clean finger 2 or 3 times a day. Floss gently between your child's teeth. When should you call for help? Call 911 anytime you think your child may need emergency care. For example, call if:    · Your child has trouble breathing.    Call your dentist or doctor now or seek immediate medical care if:    · Your child has signs of infection, such as:  ? Increased pain, swelling, warmth, or redness. ? Red streaks leading from the area. ? Pus draining from the area. ? A fever.    Watch closely for changes in your child's health, and be sure to contact your doctor if:    · Your child does not get better as expected. Where can you learn more? Go to http://asael-sloan.info/. Enter J245 in the search box to learn more about \"Tooth and Gum Pain in Children: Care Instructions. \"  Current as of: October 3, 2018  Content Version: 12.1  © 3014-0753 [employer]. Care instructions adapted under license by [employer] (which disclaims liability or warranty for this information). If you have questions about a medical condition or this instruction, always ask your healthcare professional. [place] [address] any warranty or liability for your use of this information.

## 2019-09-14 ENCOUNTER — HOSPITAL ENCOUNTER (EMERGENCY)
Age: 34
Discharge: HOME | End: 2019-09-14
Attending: EMERGENCY MEDICINE | Admitting: EMERGENCY MEDICINE
Payer: SELF-PAY

## 2019-09-14 VITALS
TEMPERATURE: 98 F | WEIGHT: 207.67 LBS | DIASTOLIC BLOOD PRESSURE: 91 MMHG | SYSTOLIC BLOOD PRESSURE: 137 MMHG | OXYGEN SATURATION: 100 % | HEIGHT: 65 IN | RESPIRATION RATE: 16 BRPM | BODY MASS INDEX: 34.6 KG/M2 | HEART RATE: 114 BPM

## 2019-09-14 DIAGNOSIS — K08.89 PAIN, DENTAL: Primary | ICD-10-CM

## 2019-09-14 PROCEDURE — 99283 EMERGENCY DEPT VISIT LOW MDM: CPT

## 2019-09-14 PROCEDURE — 74011250637 HC RX REV CODE- 250/637: Performed by: EMERGENCY MEDICINE

## 2019-09-14 PROCEDURE — 74011000250 HC RX REV CODE- 250: Performed by: EMERGENCY MEDICINE

## 2019-09-14 RX ORDER — OXYCODONE AND ACETAMINOPHEN 5; 325 MG/1; MG/1
1 TABLET ORAL
Qty: 10 TAB | Refills: 0 | Status: SHIPPED | OUTPATIENT
Start: 2019-09-14 | End: 2019-09-17

## 2019-09-14 RX ADMIN — LIDOCAINE HYDROCHLORIDE: 20 SOLUTION ORAL; TOPICAL at 03:21

## 2019-09-14 NOTE — ED PROVIDER NOTES
EMERGENCY DEPARTMENT HISTORY AND PHYSICAL EXAM      Date: 9/14/2019  Patient Name: Neisha Ramírez  Patient Age and Sex: 37 y.o. female     History of Presenting Illness     Chief Complaint   Patient presents with    Dental Pain     upper dental pain, midline; was seen in ED yesterday afternoon/evening - states the medication is not helping       History Provided By: Patient    HPI: Neisha Ramírez is a 78-year-old female with a history of dental infections in the past, presenting with upper dental pain. Patient states that about a month ago she chipped her upper front tooth eating. [facility] with the plan to remove the tooth and replace it with a new one. Was seen here in the ER sometime ago and was prescribed antibiotics and pain medication. The pain returned yesterday and she was seen here yesterday. Was prescribed antibiotics and Toradol. She was not discharged on any narcotics however. Patient states that despite the Toradol and the antibiotics the pain has continued. States that it is a 10 out of 10. Has not gotten any sleep because of it. There are no other complaints, changes, or physical findings at this time. PCP: Yola Carrillo NP    Current Facility-Administered Medications on File Prior to Encounter   Medication Dose Route Frequency Provider Last Rate Last Dose    [COMPLETED] oxyCODONE-acetaminophen (PERCOCET) 5-325 mg per tablet 2 Tab  2 Tab Oral NOW Serenity Elliott NP   2 Tab at 09/13/19 1719    [DISCONTINUED] oxyCODONE-acetaminophen (PERCOCET) 5-325 mg per tablet 1 Tab  1 Tab Oral NOW Serenity Elliott NP         Current Outpatient Medications on File Prior to Encounter   Medication Sig Dispense Refill    ketorolac (TORADOL) 10 mg tablet Take 1 Tab by mouth every six (6) hours as needed for Pain. 15 Tab 0    clindamycin (CLEOCIN) 300 mg capsule Take 1 Cap by mouth four (4) times daily for 7 days.  28 Cap 0    lidocaine (LIDOCAINE VISCOUS) 2 % solution Take 15 mL by mouth as needed for Pain. 1 Bottle 0    diflunisal (DOLOBID) 500 mg tab Take 1 Tab by mouth two (2) times a day. 20 Tab 0    metFORMIN (GLUCOPHAGE) 1,000 mg tablet Take 1,000 mg by mouth two (2) times daily (with meals). Past History     Past Medical History:  Past Medical History:   Diagnosis Date    Abnormal Pap smear     abnormal pap at 13 y/o normal since     Back pain 2015    [facility]  emerg[facility]    Diabetes [facility])     Diabetes mellitus     Gestational Diabetes with last two pregnancies-diet controlled       Past Surgical History:  Past Surgical History:   Procedure Laterality Date    HX  SECTION      HX CHOLECYSTECTOMY      HX OTHER SURGICAL      cholecystectomy        Family History:  Family History   Problem Relation Age of Onset    Diabetes Father     Hypertension Father     Stroke Father     Kidney Disease Father        Social History:  Social History     Tobacco Use    Smoking status: Never Smoker    Smokeless tobacco: Never Used   Substance Use Topics    Alcohol use: Yes     Comment: Socially    Drug use: No       Allergies: Allergies   Allergen Reactions    Amoxicillin Rash    Tramadol Hives         Review of Systems   Review of Systems   Constitutional: Negative for chills and fever. HENT: Positive for dental problem. Respiratory: Negative for cough and shortness of breath. Cardiovascular: Negative for chest pain. Gastrointestinal: Negative for constipation, diarrhea, nausea and vomiting. Neurological: Negative for weakness and numbness. All other systems reviewed and are negative. Physical Exam   Physical Exam   Constitutional: She appears well-developed. No distress. HENT:   Head: Normocephalic and atraumatic. Patient's front right tooth is cracked and decayed. Has some dental caries and other teeth. Patient is crying and not speaking secondary to the pain. Eyes: EOM are normal.   Neck: Normal range of motion. Cardiovascular:   Well perfused   Pulmonary/Chest: Effort normal. No respiratory distress. Musculoskeletal: Normal range of motion. Neurological: She is alert. Psychiatric: She has a normal mood and affect. Diagnostic Study Results     Labs -   No results found for this or any previous visit (from the past 12 hour(s)). Radiologic Studies -   No orders to display     CT Results  (Last 48 hours)    None        CXR Results  (Last 48 hours)    None            Medical Decision Making   I am the first provider for this patient. I reviewed the vital signs, available nursing notes, past medical history, past surgical history, family history and social history. Vital Signs-Reviewed the patient's vital signs. Patient Vitals for the past 12 hrs:   Temp Pulse Resp BP SpO2   09/14/19 0241 98 °F (36.7 °C) (!) 114 16 (!) 137/91 100 %       Records Reviewed: Nursing Notes and Old Medical Records    Provider Notes (Medical Decision Making):   Patient presents with dental pain. No obvious abscess that needs drainage. No red flags that make PTA, RPA, ludwigs angina concerning. Will tx with dental ball, antibiotics and outpatient analgesics. Given information on dentists and importance of followup and no smoking. *    ED Course:   Initial assessment performed. The patients presenting problems have been discussed, and they are in agreement with the care plan formulated and outlined with them. I have encouraged them to ask questions as they arise throughout their visit. Critical Care Time:   0    Disposition:  Discharge Note:  The patient has been re-evaluated and is ready for discharge. Reviewed available results with patient. Counseled patient on diagnosis and care plan. Patient has expressed understanding, and all questions have been answered. Patient agrees with plan and agrees to follow up as recommended, or to return to the ED if their symptoms worsen.  Discharge instructions have been provided and explained to the patient, along with reasons to return to the ED. PLAN:  Current Discharge Medication List      START taking these medications    Details   oxyCODONE-acetaminophen (PERCOCET) 5-325 mg per tablet Take 1 Tab by mouth every eight (8) hours as needed for Pain for up to 3 days. Max Daily Amount: 3 Tabs. Indications: pain  Qty: 10 Tab, Refills: 0    Associated Diagnoses: Pain, dental           2. Follow-up Information     Follow up With Specialties Details Why Contact Info    yasmine english  Schedule an appointment as soon as possible for a visit          3. Return to ED if worse     Diagnosis     Clinical Impression:   1. Pain, dental        Attestations:    Nahun Weiss M.D. Please note that this dictation was completed with [employer], the computer voice recognition software. Quite often unanticipated grammatical, syntax, homophones, and other interpretive errors are inadvertently transcribed by the computer software. Please disregard these errors. Please excuse any errors that have escaped final proofreading. Thank you.

## 2019-09-14 NOTE — ED TRIAGE NOTES
Pt arrived with dental pain. Pt refusing to speak. PT pulled upper lip up to show tooth front in upper jaw chipped.

## 2019-09-14 NOTE — ED NOTES
Patient discharged by Rochelle Robledo MD. Patient provided with discharge instructions Rx and instructions on follow up care. Patient out of ED ambulatory accompanied by self.

## 2019-09-14 NOTE — DISCHARGE INSTRUCTIONS
Patient Education        Tooth and Gum Pain: Care Instructions  Your Care Instructions    The most common causes of dental pain are tooth decay and gum disease. Pain can also be caused by an infection of the tooth (abscess) or the gums. Or you may have pain from a broken or cracked tooth. Other causes of pain include infection and damage to a tooth from nervous grinding of your teeth. A wisdom tooth can be painful when it is coming in but cannot break through the gum. It can also be painful when the tooth is only partway in and extra gum tissue has formed around it. The tissue can get inflamed (pericoronitis), and sometimes it gets infected. Prompt dental care can help find the cause of your toothache and keep the tooth from dying or gum disease from getting worse. Self-care at home may reduce your pain and discomfort. Follow-up care is a key part of your treatment and safety. Be sure to make and go to all appointments, and call your dentist or doctor if you are having problems. It's also a good idea to know your test results and keep a list of the medicines you take. How can you care for yourself at home? · To reduce pain and facial swelling, put an ice or cold pack on the outside of your cheek for 10 to 20 minutes at a time. Put a thin cloth between the ice and your skin. Do not use heat. · If your doctor prescribed antibiotics, take them as directed. Do not stop taking them just because you feel better. You need to take the full course of antibiotics. · Ask your doctor if you can take an over-the-counter pain medicine, such as acetaminophen (Tylenol), ibuprofen (Advil, Motrin), or naproxen (Aleve). Be safe with medicines. Read and follow all instructions on the label. · Avoid very hot, cold, or sweet foods and drinks if they increase your pain. · Rinse your mouth with warm salt water every 2 hours to help relieve pain and swelling. Mix 1 teaspoon of salt in 8 ounces of water.   · Talk to your dentist about using special toothpaste for sensitive teeth. To reduce pain on contact with heat or cold or when brushing, brush with this toothpaste regularly or rub a small amount of the paste on the sensitive area with a clean finger 2 or 3 times a day. Floss gently between your teeth. · Do not smoke or use spit tobacco. Tobacco use can make gum problems worse, decreases your ability to fight infection in your gums, and delays healing. If you need help quitting, talk to your doctor about stop-smoking programs and medicines. These can increase your chances of quitting for good. When should you call for help? Call 911 anytime you think you may need emergency care. For example, call if:    · You have trouble breathing.    Call your dentist or doctor now or seek immediate medical care if:    · You have signs of infection, such as:  ? Increased pain, swelling, warmth, or redness. ? Red streaks leading from the area. ? Pus draining from the area. ? A fever.    Watch closely for changes in your health, and be sure to contact your doctor if:    · You do not get better as expected. Where can you learn more? Go to http://asael-sloan.info/. Enter 0363 [id number] in the search box to learn more about \"Tooth and Gum Pain: Care Instructions. \"  Current as of: October 3, 2018  Content Version: 12.1  © 4648-1088 [employer]. Care instructions adapted under license by [employer] (which disclaims liability or warranty for this information). If you have questions about a medical condition or this instruction, always ask your healthcare professional. [place] [address] any warranty or liability for your use of this information.

## 2019-09-23 ENCOUNTER — HOSPITAL ENCOUNTER (EMERGENCY)
Age: 34
Discharge: HOME | End: 2019-09-23
Attending: EMERGENCY MEDICINE
Payer: SELF-PAY

## 2019-09-23 VITALS
OXYGEN SATURATION: 100 % | WEIGHT: 204.81 LBS | TEMPERATURE: 98.8 F | SYSTOLIC BLOOD PRESSURE: 139 MMHG | HEIGHT: 65 IN | HEART RATE: 98 BPM | BODY MASS INDEX: 34.12 KG/M2 | DIASTOLIC BLOOD PRESSURE: 80 MMHG | RESPIRATION RATE: 16 BRPM

## 2019-09-23 DIAGNOSIS — T14.8XXA MUSCLE STRAIN: Primary | ICD-10-CM

## 2019-09-23 PROCEDURE — 74011250636 HC RX REV CODE- 250/636: Performed by: EMERGENCY MEDICINE

## 2019-09-23 PROCEDURE — 96372 THER/PROPH/DIAG INJ SC/IM: CPT

## 2019-09-23 PROCEDURE — 99283 EMERGENCY DEPT VISIT LOW MDM: CPT

## 2019-09-23 RX ORDER — DIAZEPAM 5 MG/1
5 TABLET ORAL
Status: DISCONTINUED | OUTPATIENT
Start: 2019-09-23 | End: 2019-09-23 | Stop reason: HOSPADM

## 2019-09-23 RX ORDER — KETOROLAC TROMETHAMINE 10 MG/1
10 TABLET, FILM COATED ORAL
Qty: 15 TAB | Refills: 0 | Status: SHIPPED | OUTPATIENT
Start: 2019-09-23 | End: 2019-10-20

## 2019-09-23 RX ORDER — LIDOCAINE 4 G/100G
PATCH TOPICAL
Qty: 14 PATCH | Refills: 0 | Status: SHIPPED | OUTPATIENT
Start: 2019-09-23 | End: 2019-10-20

## 2019-09-23 RX ORDER — KETOROLAC TROMETHAMINE 30 MG/ML
30 INJECTION, SOLUTION INTRAMUSCULAR; INTRAVENOUS ONCE
Status: COMPLETED | OUTPATIENT
Start: 2019-09-23 | End: 2019-09-23

## 2019-09-23 RX ORDER — DIAZEPAM 5 MG/1
5 TABLET ORAL
Qty: 6 TAB | Refills: 0 | Status: SHIPPED | OUTPATIENT
Start: 2019-09-23 | End: 2019-10-20

## 2019-09-23 RX ADMIN — KETOROLAC TROMETHAMINE 30 MG: 30 INJECTION, SOLUTION INTRAMUSCULAR at 18:21

## 2019-09-23 NOTE — ED NOTES
Discharge instructions reviewed with patient. Discharge instructions given to patient per Dr. Pasquale Zuniga. Patient able to return/verbalize discharge instructions. Copy of discharge instructions given. Patient condition stable, respiratory status within normal limits, neuro status intact. Ambulatory out of ER.

## 2019-09-23 NOTE — ED NOTES
Report received from [facility]. They advised of the patient's chief complaint, current status, orders completed (to include IV access/medications/radiology testing), outstanding orders that still need to be completed, and the treatment plan. Questions asked and answered prior to assumption of care.
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
## 2019-09-23 NOTE — LETTER
[facility] EMERGENCY DEPT 
[address] [place] [address] 
[phone] Work/School Note Date: 9/23/2019 To Whom It May concern: 
 
Jerica Medina was seen and treated today in the emergency room by the following provider(s): 
Attending Provider: Deepthi Loo MD.   
 
Jerica Medina may return to work on 9/24/19. Sincerely, 
 
 
 
 
Melodie Palomares MD

## 2019-09-23 NOTE — ED NOTES
Patient driving so she will not be receiving the Valium prior to arrival. MD notified and she is working on discharge papers for her.

## 2019-09-23 NOTE — DISCHARGE INSTRUCTIONS
Patient Education        Muscle Strain: Care Instructions  Your Care Instructions  A muscle strain happens when you overstretch, or pull, a muscle. It can happen when you exercise or lift something or when you have an accident. Rest and other home care can help the muscle heal.  Follow-up care is a key part of your treatment and safety. Be sure to make and go to all appointments, and call your doctor if you are having problems. It's also a good idea to know your test results and keep a list of the medicines you take. How can you care for yourself at home? · Rest the strained muscle. Do not put weight on it for a day or two. If your doctor advises you to, use crutches or a sling to rest a sore limb. · Put ice or a cold pack on the sore muscle for 10 to 20 minutes at a time to stop swelling. Put a thin cloth between the ice pack and your skin. · Prop up the sore arm or leg on a pillow when you ice it or anytime you sit or lie down during the next 3 days. Try to keep it above the level of your heart. This will help reduce swelling. · Take pain medicines exactly as directed. ? If the doctor gave you a prescription medicine for pain, take it as prescribed. ? If you are not taking a prescription pain medicine, ask your doctor if you can take an over-the-counter medicine. · Do not do anything that makes the pain worse. Return to exercise gradually as you feel better. When should you call for help? Call your doctor now or seek immediate medical care if:    · You have new severe pain.     · Your injured limb is cool or pale or changes color.     · You have tingling, weakness, or numbness in your injured limb.     · You cannot move the injured area.    Watch closely for changes in your health, and be sure to contact your doctor if:    · You cannot put weight on a joint, or it feels unsteady when you walk.     · Pain and swelling get worse or do not start to get better after 2 days of home treatment.    Where can you learn more? Go to http://asael-sloan.info/. Enter L197 in the search box to learn more about \"Muscle Strain: Care Instructions. \"  Current as of: June 26, 2019  Content Version: 12.2  © 7938-4783 [employer]. Care instructions adapted under license by [facility] [employer] (which disclaims liability or warranty for this information). If you have questions about a medical condition or this instruction, always ask your healthcare professional. [place] [address] any warranty or liability for your use of this information. Patient Education        Low Back Pain: Exercises  Introduction  Here are some examples of exercises for you to try. The exercises may be suggested for a condition or for rehabilitation. Start each exercise slowly. Ease off the exercises if you start to have pain. You will be told when to start these exercises and which ones will work best for you. How to do the exercises  Press-up    1. Lie on your stomach, supporting your body with your forearms. 2. Press your elbows down into the floor to raise your upper back. As you do this, relax your stomach muscles and allow your back to arch without using your back muscles. As your press up, do not let your hips or pelvis come off the floor. 3. Hold for 15 to 30 seconds, then relax. 4. Repeat 2 to 4 times. Alternate arm and leg (bird dog) exercise    1. Start on the floor, on your hands and knees. 2. Tighten your belly muscles. 3. Raise one leg off the floor, and hold it straight out behind you. Be careful not to let your hip drop down, because that will twist your trunk. 4. Hold for about 6 seconds, then lower your leg and switch to the other leg. 5. Repeat 8 to 12 times on each leg. 6. Over time, work up to holding for 10 to 30 seconds each time.   7. If you feel stable and secure with your leg raised, try raising the opposite arm straight out in front of you at the same time.    Knee-to-chest exercise    1. Lie on your back with your knees bent and your feet flat on the floor. 2. Bring one knee to your chest, keeping the other foot flat on the floor (or keeping the other leg straight, whichever feels better on your lower back). 3. Keep your lower back pressed to the floor. Hold for at least 15 to 30 seconds. 4. Relax, and lower the knee to the starting position. 5. Repeat with the other leg. Repeat 2 to 4 times with each leg. 6. To get more stretch, put your other leg flat on the floor while pulling your knee to your chest.    Curl-ups    1. Lie on the floor on your back with your knees bent at a 90-degree angle. Your feet should be flat on the floor, about 12 inches from your buttocks. 2. Cross your arms over your chest. If this bothers your neck, try putting your hands behind your neck (not your head), with your elbows spread apart. 3. Slowly tighten your belly muscles and raise your shoulder blades off the floor. 4. Keep your head in line with your body, and do not press your chin to your chest.  5. Hold this position for 1 or 2 seconds, then slowly lower yourself back down to the floor. 6. Repeat 8 to 12 times. Pelvic tilt exercise    1. Lie on your back with your knees bent. 2. \"Brace\" your stomach. This means to tighten your muscles by pulling in and imagining your belly button moving toward your spine. You should feel like your back is pressing to the floor and your hips and pelvis are rocking back. 3. Hold for about 6 seconds while you breathe smoothly. 4. Repeat 8 to 12 times. Heel dig bridging    1. Lie on your back with both knees bent and your ankles bent so that only your heels are digging into the floor. Your knees should be bent about 90 degrees. 2. Then push your heels into the floor, squeeze your buttocks, and lift your hips off the floor until your shoulders, hips, and knees are all in a straight line.   3. Hold for about 6 seconds as you continue to breathe normally, and then slowly lower your hips back down to the floor and rest for up to 10 seconds. 4. Do 8 to 12 repetitions. Hamstring stretch in doorway    1. Lie on your back in a doorway, with one leg through the open door. 2. Slide your leg up the wall to straighten your knee. You should feel a gentle stretch down the back of your leg. 3. Hold the stretch for at least 15 to 30 seconds. Do not arch your back, point your toes, or bend either knee. Keep one heel touching the floor and the other heel touching the wall. 4. Repeat with your other leg. 5. Do 2 to 4 times for each leg. Hip flexor stretch    1. Kneel on the floor with one knee bent and one leg behind you. Place your forward knee over your foot. Keep your other knee touching the floor. 2. Slowly push your hips forward until you feel a stretch in the upper thigh of your rear leg. 3. Hold the stretch for at least 15 to 30 seconds. Repeat with your other leg. 4. Do 2 to 4 times on each side. Wall sit    1. Stand with your back 10 to 12 inches away from a wall. 2. Lean into the wall until your back is flat against it. 3. Slowly slide down until your knees are slightly bent, pressing your lower back into the wall. 4. Hold for about 6 seconds, then slide back up the wall. 5. Repeat 8 to 12 times. Follow-up care is a key part of your treatment and safety. Be sure to make and go to all appointments, and call your doctor if you are having problems. It's also a good idea to know your test results and keep a list of the medicines you take. Where can you learn more? Go to http://asael-sloan.info/. Enter N318 in the search box to learn more about \"Low Back Pain: Exercises. \"  Current as of: June 26, 2019  Content Version: 12.2  © 7483-5494 [employer]. Care instructions adapted under license by [employer] (which disclaims liability or warranty for this information).  If you have questions about a medical condition or this instruction, always ask your healthcare professional. [place] [address] any warranty or liability for your use of this information.

## 2019-09-24 ENCOUNTER — HOSPITAL ENCOUNTER (EMERGENCY)
Age: 34
Discharge: HOME | End: 2019-09-24
Attending: EMERGENCY MEDICINE
Payer: SELF-PAY

## 2019-09-24 VITALS
HEIGHT: 65 IN | TEMPERATURE: 99.2 F | OXYGEN SATURATION: 99 % | DIASTOLIC BLOOD PRESSURE: 99 MMHG | RESPIRATION RATE: 14 BRPM | SYSTOLIC BLOOD PRESSURE: 160 MMHG | BODY MASS INDEX: 34.27 KG/M2 | WEIGHT: 205.69 LBS | HEART RATE: 100 BPM

## 2019-09-24 DIAGNOSIS — M54.41 CHRONIC RIGHT-SIDED LOW BACK PAIN WITH RIGHT-SIDED SCIATICA: Primary | ICD-10-CM

## 2019-09-24 DIAGNOSIS — G89.29 CHRONIC RIGHT-SIDED LOW BACK PAIN WITH RIGHT-SIDED SCIATICA: Primary | ICD-10-CM

## 2019-09-24 LAB — HCG UR QL: NEGATIVE

## 2019-09-24 PROCEDURE — 81025 URINE PREGNANCY TEST: CPT

## 2019-09-24 PROCEDURE — 96372 THER/PROPH/DIAG INJ SC/IM: CPT

## 2019-09-24 PROCEDURE — 74011250637 HC RX REV CODE- 250/637: Performed by: NURSE PRACTITIONER

## 2019-09-24 PROCEDURE — 99283 EMERGENCY DEPT VISIT LOW MDM: CPT

## 2019-09-24 PROCEDURE — 74011250636 HC RX REV CODE- 250/636: Performed by: NURSE PRACTITIONER

## 2019-09-24 RX ORDER — KETOROLAC TROMETHAMINE 30 MG/ML
30 INJECTION, SOLUTION INTRAMUSCULAR; INTRAVENOUS
Status: COMPLETED | OUTPATIENT
Start: 2019-09-24 | End: 2019-09-24

## 2019-09-24 RX ORDER — NAPROXEN 500 MG/1
500 TABLET ORAL 2 TIMES DAILY WITH MEALS
Qty: 20 TAB | Refills: 0 | Status: SHIPPED | OUTPATIENT
Start: 2019-09-24 | End: 2019-10-04

## 2019-09-24 RX ORDER — DEXAMETHASONE SODIUM PHOSPHATE 4 MG/ML
8 INJECTION, SOLUTION INTRA-ARTICULAR; INTRALESIONAL; INTRAMUSCULAR; INTRAVENOUS; SOFT TISSUE
Status: COMPLETED | OUTPATIENT
Start: 2019-09-24 | End: 2019-09-24

## 2019-09-24 RX ORDER — CYCLOBENZAPRINE HCL 10 MG
10 TABLET ORAL
Qty: 30 TAB | Refills: 0 | Status: SHIPPED | OUTPATIENT
Start: 2019-09-24 | End: 2019-10-20

## 2019-09-24 RX ORDER — CYCLOBENZAPRINE HCL 10 MG
10 TABLET ORAL
Status: COMPLETED | OUTPATIENT
Start: 2019-09-24 | End: 2019-09-24

## 2019-09-24 RX ORDER — OXYCODONE AND ACETAMINOPHEN 5; 325 MG/1; MG/1
1 TABLET ORAL
Status: DISCONTINUED | OUTPATIENT
Start: 2019-09-24 | End: 2019-09-24 | Stop reason: HOSPADM

## 2019-09-24 RX ADMIN — CYCLOBENZAPRINE HYDROCHLORIDE 10 MG: 10 TABLET, FILM COATED ORAL at 17:55

## 2019-09-24 RX ADMIN — DEXAMETHASONE SODIUM PHOSPHATE 8 MG: 4 INJECTION, SOLUTION INTRAMUSCULAR; INTRAVENOUS at 17:55

## 2019-09-24 RX ADMIN — KETOROLAC TROMETHAMINE 30 MG: 30 INJECTION, SOLUTION INTRAMUSCULAR at 17:55

## 2019-09-24 NOTE — ED NOTES
NP discussed dc instructions and information with pt. Pt verbalized understanding. Pt ambulatory with steady gait upon leave. No signs of distress noted.
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
## 2019-09-24 NOTE — LETTER
NOTIFICATION RETURN TO WORK / SCHOOL 
 
9/24/2019 7:08 PM 
 
Ms. Ana Rosa Starr [address] [facility] [place] [address] To Whom It May Concern: 
 
Ana Rosa Starr is currently under the care of [facility] EMERGENCY DEPT. She will return to work/school on: 09/26/2019 If there are questions or concerns please have the patient contact our office.  
 
 
 
Sincerely, 
 
 
Varun HERNANDEZ

## 2019-09-24 NOTE — DISCHARGE INSTRUCTIONS
Patient Education        Back Pain: Care Instructions  Your Care Instructions    Back pain has many possible causes. It is often related to problems with muscles and ligaments of the back. It may also be related to problems with the nerves, discs, or bones of the back. Moving, lifting, standing, sitting, or sleeping in an awkward way can strain the back. Sometimes you don't notice the injury until later. Arthritis is another common cause of back pain. Although it may hurt a lot, back pain usually improves on its own within several weeks. Most people recover in 12 weeks or less. Using good home treatment and being careful not to stress your back can help you feel better sooner. Follow-up care is a key part of your treatment and safety. Be sure to make and go to all appointments, and call your doctor if you are having problems. It's also a good idea to know your test results and keep a list of the medicines you take. How can you care for yourself at home? · Sit or lie in positions that are most comfortable and reduce your pain. Try one of these positions when you lie down:  ? Lie on your back with your knees bent and supported by large pillows. ? Lie on the floor with your legs on the seat of a sofa or chair. ? Lie on your side with your knees and hips bent and a pillow between your legs. ? Lie on your stomach if it does not make pain worse. · Do not sit up in bed, and avoid soft couches and twisted positions. Bed rest can help relieve pain at first, but it delays healing. Avoid bed rest after the first day of back pain. · Change positions every 30 minutes. If you must sit for long periods of time, take breaks from sitting. Get up and walk around, or lie in a comfortable position. · Try using a heating pad on a low or medium setting for 15 to 20 minutes every 2 or 3 hours. Try a warm shower in place of one session with the heating pad. · You can also try an ice pack for 10 to 15 minutes every 2 to 3 hours. Put a thin cloth between the ice pack and your skin. · Take pain medicines exactly as directed. ? If the doctor gave you a prescription medicine for pain, take it as prescribed. ? If you are not taking a prescription pain medicine, ask your doctor if you can take an over-the-counter medicine. · Take short walks several times a day. You can start with 5 to 10 minutes, 3 or 4 times a day, and work up to longer walks. Walk on level surfaces and avoid hills and stairs until your back is better. · Return to work and other activities as soon as you can. Continued rest without activity is usually not good for your back. · To prevent future back pain, do exercises to stretch and strengthen your back and stomach. Learn how to use good posture, safe lifting techniques, and proper body mechanics. When should you call for help? Call your doctor now or seek immediate medical care if:    · You have new or worsening numbness in your legs.     · You have new or worsening weakness in your legs. (This could make it hard to stand up.)     · You lose control of your bladder or bowels.    Watch closely for changes in your health, and be sure to contact your doctor if:    · You have a fever, lose weight, or don't feel well.     · You do not get better as expected. Where can you learn more? Go to http://asael-sloan.info/. Enter M756 in the search box to learn more about \"Back Pain: Care Instructions. \"  Current as of: June 26, 2019  Content Version: 12.2  © 3571-6344 [employer]. Care instructions adapted under license by [employer] (which disclaims liability or warranty for this information). If you have questions about a medical condition or this instruction, always ask your healthcare professional. [place] [address] any warranty or liability for your use of this information.          Patient Education        Chronic Pain: Care Instructions  Your Care Instructions    Chronic pain is pain that lasts a long time (months or even years) and may or may not have a clear cause. It is different from acute pain, which usually does have a clear cause--like an injury or illness--and gets better over time. Chronic pain:  · Lasts over time but may vary from day to day. · Does not go away despite efforts to end it. · May disrupt your sleep and lead to fatigue. · May cause depression or anxiety. · May make your muscles tense, causing more pain. · Can disrupt your work, hobbies, home life, and relationships with friends and family. Chronic pain is a very real condition. It is not just in your head. Treatment can help and usually includes several methods used together, such as medicines, physical therapy, exercise, and other treatments. Learning how to relax and changing negative thought patterns can also help you cope. Chronic pain is complex. Taking an active role in your treatment will help you better manage your pain. Tell your doctor if you have trouble dealing with your pain. You may have to try several things before you find what works best for you. Follow-up care is a key part of your treatment and safety. Be sure to make and go to all appointments, and call your doctor if you are having problems. It's also a good idea to know your test results and keep a list of the medicines you take. How can you care for yourself at home? · Pace yourself. Break up large jobs into smaller tasks. Save harder tasks for days when you have less pain, or go back and forth between hard tasks and easier ones. Take rest breaks. · Relax, and reduce stress. Relaxation techniques such as deep breathing or meditation can help. · Keep moving. Gentle, daily exercise can help reduce pain over the long run. Try low- or no-impact exercises such as walking, swimming, and stationary biking. Do stretches to stay flexible. · Try heat, cold packs, and massage. · Get enough sleep.  Chronic pain can make you tired and drain your energy. Talk with your doctor if you have trouble sleeping because of pain. · Think positive. Your thoughts can affect your pain level. Do things that you enjoy to distract yourself when you have pain instead of focusing on the pain. See a movie, read a book, listen to music, or spend time with a friend. · If you think you are depressed, talk to your doctor about treatment. · Keep a daily pain diary. Record how your moods, thoughts, sleep patterns, activities, and medicine affect your pain. You may find that your pain is worse during or after certain activities or when you are feeling a certain emotion. Having a record of your pain can help you and your doctor find the best ways to treat your pain. · Take pain medicines exactly as directed. ? If the doctor gave you a prescription medicine for pain, take it as prescribed. ? If you are not taking a prescription pain medicine, ask your doctor if you can take an over-the-counter medicine. Reducing constipation caused by pain medicine  · Include fruits, vegetables, beans, and whole grains in your diet each day. These foods are high in fiber. · Drink plenty of fluids, enough so that your urine is light yellow or clear like water. If you have kidney, heart, or liver disease and have to limit fluids, talk with your doctor before you increase the amount of fluids you drink. · If your doctor recommends it, get more exercise. Walking is a good choice. Bit by bit, increase the amount you walk every day. Try for at least 30 minutes on most days of the week. · Schedule time each day for a bowel movement. A daily routine may help. Take your time and do not strain when having a bowel movement. When should you call for help? Call your doctor now or seek immediate medical care if:    · Your pain gets worse or is out of control.     · You feel down or blue, or you do not enjoy things like you once did.  You may be depressed, which is common in people with chronic pain. Depression can be treated.     · You have vomiting or cramps for more than 2 hours.    Watch closely for changes in your health, and be sure to contact your doctor if:    · You cannot sleep because of pain.     · You are very worried or anxious about your pain.     · You have trouble taking your pain medicine.     · You have any concerns about your pain medicine.     · You have trouble with bowel movements, such as:  ? No bowel movement in 3 days. ? Blood in the anal area, in your stool, or on the toilet paper. ? Diarrhea for more than 24 hours. Where can you learn more? Go to http://asael-sloan.info/. Enter N004 in the search box to learn more about \"Chronic Pain: Care Instructions. \"  Current as of: March 28, 2019  Content Version: 12.2  © 4934-9892 [employer], [employer]. Care instructions adapted under license by [employer] (which disclaims liability or warranty for this information). If you have questions about a medical condition or this instruction, always ask your healthcare professional. [address] any warranty or liability for your use of this information.

## 2019-09-30 ENCOUNTER — DOCUMENTATION ONLY (OUTPATIENT)
Dept: CASE MANAGEMENT | Age: 34
End: 2019-09-30

## 2019-09-30 NOTE — MANAGEMENT PLAN
Patient Management Plan        Pt Name: Lizzie Lagos OBE:[birth year]        Management Plan by: Desi Salazar Team                                                                                         Date Placed:  09/30/19     Pt's Physicians:         Primary Care:  Juan Pablo Bar [phone] (outside provider)                  Summary    Reason for Referral: This patient has been provided a management plan for Chronic Pain and Radiation Limitation     Patient with frequent visits for: chronic back pain, dental pain, other various pain complaints - specifically requesting pain medication on multiple occasions     Warning/Safety Alert:     :   Prescriptions: 33   Prescribers: 21   Pharmacies:  8     (Opioids mostly prescribed from multiple EDs)    Narx Report Scores and Risk Indicators: Narcotic: 460   Sedative:  290    Risk Score:  620 . This indicates the pt is 85  times more likely to experience an unintentional overdose than the general population. Guidelines recommend discussing concerns with pt/review use patterns for unsafe conditions. Situation: Chronic Conditions Summary -     Root Cause Medical Problems List:  Uncontrolled DM2 (not filling Metformin)  Root Cause Psychosocial Problems List:  ALEJANDRO (long hx of Opioid use), previously Dx with depression and anxiety but is not currently receiving tx (may contribute to poor self-care)  Root Cause Social Determinants of Health (SDOH):  Unknown employment, single, 3 children. Incidence of Testing: Over past 12 months (hx of numerous testing): 10 XR, 1 CT   Pattern of access between September 2018-Sept 2019:  949 [facility] ED visits, 12 ED visits @ [facility], 1 ED visits at [address] [place] [address]. Sporadic times. 0 hospitalizations.      Goals/Interventions:      ED Provider/nursing(together) to discuss mgmt plan w/ pt   ED Provider to review  with pt   Nursing to obtain ordered UDS (None on file)   Strongly consider non-narcotic medications/alternatives unless emergently necessary.  Educate patient on the appropriate use of ED. Provide 24-hour Nurse Access Line at [facility] ([phone]) and advise patient RNs can locate walk-in care or a physician who can see you the same or next day for minor symptoms.  Include the Opioid overdose: Naloxone general Info in AVS. Encourage pt to identify someone who can administer Narcan in the event pt becomes unresponsive.  Provide pt with High ED utilizer letter and [facility] Safe Opioid Prescribing Guidelines    CM: Please determine barriers to filling Metformin and HCTZ.  CM: Please provide counseling services: [address]: [phone], [facility] in [place]: [phone]    During Business Hours: CM/PCPs Office   After Hours: [facility](only if pt in crisis)   Challenges for Self Management:      Financial Constraints:  No insurance   Poor follow ups -Broke pain contract in 2015 at [facility] that time she has come to the ED regularly for pain medication.  Utilization: HIGH ED Utilization   Emotional status of Patient: anxious possibly depressed   Behavioral Health Factors:  Poor self-care w/ DM2 and non-compliance w/ prescribed meds. Transportation: Prior to arranging transport, ask pt to call family member for ride home, if pt cannot locate ride, staff to call emergency contact and request ride for pt. Medication Management: Complications from poor adherence, Poly-pharm  Appears pt only fills opioid scripts   Advanced Care Plan: None           ** This plan has been created by the Care Coordination Committee, a multi-disciplinary team. The patient and their physician were invited to participate in this plan. This management plan is intended to provide consistent evaluation and treatment for this patient not to supersede physician judgment. **

## 2019-10-20 ENCOUNTER — HOSPITAL ENCOUNTER (EMERGENCY)
Age: 34
Discharge: HOME | End: 2019-10-20
Attending: EMERGENCY MEDICINE
Payer: SELF-PAY

## 2019-10-20 VITALS
OXYGEN SATURATION: 100 % | TEMPERATURE: 98 F | WEIGHT: 208.11 LBS | SYSTOLIC BLOOD PRESSURE: 146 MMHG | DIASTOLIC BLOOD PRESSURE: 89 MMHG | RESPIRATION RATE: 16 BRPM | HEART RATE: 100 BPM | HEIGHT: 65 IN | BODY MASS INDEX: 34.67 KG/M2

## 2019-10-20 DIAGNOSIS — K08.89 PAIN, DENTAL: Primary | ICD-10-CM

## 2019-10-20 DIAGNOSIS — K02.9 INFECTED DENTAL CARRIES: ICD-10-CM

## 2019-10-20 DIAGNOSIS — K04.7 INFECTED DENTAL CARRIES: ICD-10-CM

## 2019-10-20 PROCEDURE — 74011250637 HC RX REV CODE- 250/637: Performed by: EMERGENCY MEDICINE

## 2019-10-20 PROCEDURE — 99283 EMERGENCY DEPT VISIT LOW MDM: CPT

## 2019-10-20 PROCEDURE — 74011000250 HC RX REV CODE- 250: Performed by: EMERGENCY MEDICINE

## 2019-10-20 RX ORDER — LIDOCAINE HYDROCHLORIDE 20 MG/ML
15 SOLUTION OROPHARYNGEAL
Status: COMPLETED | OUTPATIENT
Start: 2019-10-20 | End: 2019-10-20

## 2019-10-20 RX ORDER — ACETAMINOPHEN 325 MG/1
975 TABLET ORAL
Status: COMPLETED | OUTPATIENT
Start: 2019-10-20 | End: 2019-10-20

## 2019-10-20 RX ADMIN — LIDOCAINE HYDROCHLORIDE 15 ML: 20 SOLUTION ORAL; TOPICAL at 17:19

## 2019-10-20 RX ADMIN — ACETAMINOPHEN 975 MG: 325 TABLET ORAL at 17:19

## 2019-10-20 NOTE — ED NOTES
Pt reports she is in the process of getting approved at the [facility] dental clinic. Dr Pam Petit evaluating pt. Pt declined dental block.

## 2019-10-20 NOTE — ED NOTES
Assumed care of patient. Pt resting in position of comfort. Call bell within reach. Pt presents to ED with upper left dental pain since Friday. Reports that she had a similar pain 1 year ago and saw her dentist. Reports at that time her dentist did not perform any interventions. Friday she started with the pain after eating nachos. Has tried NSAIDs and ora-gel with no relief.

## 2019-10-20 NOTE — ED NOTES
Discharge instructions reviewed with pt by provider. pt verbalized understanding of discharge instructions. Copy of discharge paperwork given. pt signed the discharge paperwork to acknowledge that the paperwork was received. Signed sheet placed in scan box. Patient condition stable, respiratory status within normal limits, neuro status intact.  ambulatory out of er

## 2019-10-20 NOTE — LETTER
[address] 
[facility] EMERGENCY DEPT 
[address] [place] [address] Uziel Rodríguez [address] 
[phone] Work/School Note Date: 10/20/2019 To Whom It May concern: 
 
Marcio Chapman was seen and treated today in the emergency room by the following provider(s): 
Attending Provider: Tatiana Hess MD.   
 
Marcio Chapman may return to work on 10/23/19. Sincerely, Dennie Floro, MD

## 2019-10-20 NOTE — ED PROVIDER NOTES
EMERGENCY DEPARTMENT HISTORY AND PHYSICAL EXAM      Date: 10/20/2019  Patient Name: Dimitry Donnelly  Patient Age and Sex: 37 y.o. female    History of Presenting Illness     Chief Complaint   Patient presents with    Dental Pain     R upper dental pain x several days; states that she has been trying to get in with [facility]       History Provided By: Patient    HPI: Dimitry Donnelly, is a 37 y.o. female presents with left upper tooth pain that began on Friday after she ate nachos. She has known history of fairly significant dental caries and is followed by the dental clinic at [facility]. She is going to be able to see them until Wednesday but does did not feel that she can wait that long given the pain severity. She denies any fevers or chills. No facial swelling. Pt denies any other alleviating or exacerbating factors. There are no other complaints, changes or physical findings at this time.      Past Medical History:   Diagnosis Date    Abnormal Pap smear     abnormal pap at 15 y/o normal since     Back pain 2015    [facility]  emergency [facility]     Diabetes mellitus     Gestational Diabetes with last two pregnancies-diet controlled     Past Surgical History:   Procedure Laterality Date    HX  SECTION      HX CHOLECYSTECTOMY      HX OTHER SURGICAL      cholecystectomy        PCP: Coni Mcclelland NP    Past History   Past Medical History:  Past Medical History:   Diagnosis Date    Abnormal Pap smear     abnormal pap at 15 y/o normal since     Back pain 2015    [facility]  emergency [facility])     Diabetes mellitus     Gestational Diabetes with last two pregnancies-diet controlled       Past Surgical History:  Past Surgical History:   Procedure Laterality Date    HX  SECTION      HX CHOLECYSTECTOMY      HX OTHER SURGICAL      cholecystectomy        Family History:  Family History   Problem Relation Age of Onset    Diabetes Father    [address] [facility] Hypertension Father     Stroke Father     Kidney Disease Father        Social History:  Social History     Tobacco Use    Smoking status: Never Smoker    Smokeless tobacco: Never Used   Substance Use Topics    Alcohol use: Yes     Comment: Socially    Drug use: No       Allergies: Allergies   Allergen Reactions    Amoxicillin Rash    Tramadol Hives       Current Medications:  No current facility-administered medications on file prior to encounter. Current Outpatient Medications on File Prior to Encounter   Medication Sig Dispense Refill    metFORMIN (GLUCOPHAGE) 1,000 mg tablet Take 1,000 mg by mouth two (2) times daily (with meals).  [DISCONTINUED] cyclobenzaprine (FLEXERIL) 10 mg tablet Take 1 Tab by mouth three (3) times daily as needed for Muscle Spasm(s). 30 Tab 0    [DISCONTINUED] ketorolac (TORADOL) 10 mg tablet Take 1 Tab by mouth every six (6) hours as needed for Pain. 15 Tab 0    [DISCONTINUED] diazePAM (VALIUM) 5 mg tablet Take 1 Tab by mouth every eight (8) hours as needed (spasm). Max Daily Amount: 15 mg. 6 Tab 0    [DISCONTINUED] lidocaine 4 % patch Place patch for 12 hours, then remove for 12 hours 14 Patch 0    [DISCONTINUED] diflunisal (DOLOBID) 500 mg tab Take 1 Tab by mouth two (2) times a day. 20 Tab 0       Review of Systems   Review of Systems   Constitutional: Negative. Negative for appetite change, chills and fever. HENT: Positive for dental problem. Negative for congestion, drooling, ear pain, facial swelling, mouth sores, rhinorrhea, sinus pain, sore throat, trouble swallowing and voice change. Respiratory: Negative for cough, chest tightness, shortness of breath, wheezing and stridor. Cardiovascular: Negative for chest pain, palpitations and leg swelling. Gastrointestinal: Negative for abdominal pain, blood in stool, constipation, diarrhea, nausea and vomiting.    Genitourinary: Negative for difficulty urinating, dysuria, flank pain, frequency and hematuria. Musculoskeletal: Negative for arthralgias and joint swelling. Skin: Negative. Neurological: Negative for dizziness, syncope, weakness, numbness and headaches. All other systems reviewed and are negative. Physical Exam   Physical Exam   Constitutional: She is oriented to person, place, and time. She appears well-developed and well-nourished. HENT:   Head: Atraumatic. Right Ear: External ear normal.   Left Ear: External ear normal.   Mouth/Throat: Oropharynx is clear and moist. No oropharyngeal exudate. Tenderness along tooth numbers 25-28 as depicted. No abscess. Multiple dental carries. Eyes: Pupils are equal, round, and reactive to light. Conjunctivae and EOM are normal. No scleral icterus. Neck: Normal range of motion. Neck supple. No JVD present. Cardiovascular: Normal rate, regular rhythm, normal heart sounds and intact distal pulses. Pulmonary/Chest: Effort normal and breath sounds normal. She exhibits no tenderness. Abdominal: Soft. Bowel sounds are normal. She exhibits no distension. There is no tenderness. Musculoskeletal: Normal range of motion. She exhibits no edema. Neurological: She is alert and oriented to person, place, and time. No cranial nerve deficit. Skin: Skin is warm and dry. She is not diaphoretic. Nursing note and vitals reviewed. Diagnostic Study Results     Labs -  No results found for this or any previous visit (from the past 24 hour(s)). Radiologic Studies -   No orders to display         Medical Decision Making   I am the first provider for this patient. Records Reviewed: I reviewed our electronic medical record system for any past medical records that were available that may contribute to the patient's current condition, including their PMH, surgical history, social and family history. Reviewed the nursing notes and vital signs from today's visit. Vital Signs-Reviewed the patient's vital signs.   Patient Vitals for the past 24 hrs:   Temp Pulse Resp BP SpO2   10/20/19 1637 98 °F (36.7 °C) 100 16 146/89 100 %     Provider Notes (Medical Decision Making):   38 yo female presents to the ED with acute on chronic dental pain. Her exam does not show a visible abscess or fluctuance. Diffuse dental carries noted. On reviewing her chart, it seems that she was seen already today at a different hospital for the same where a CT maxface showed no evidence of abscess. She was prescribed clindamycin and dental balls. For additional pain control, patient was advised to supplement motrin to the oxycodone she was recently prescribed for back pain. I asked Ms. Valerie Brown about this previous ER visit and she now admits to being seen at a different hospital earlier today but did not feel that she can handle her pain without additional pain medicine. I've reviewed her  report and our care management plan. I've shared the  report with the patient explaining that I am concerned about her frequent use of narcotics obtained from multiple providers. I compassionately explained to Martín Jones that I felt providing opiate medications from the emergency department was counterproductive in that this may cause or exacerbate tolerance, acute overdose, physiological or psychological dependence, or withdrawal. We discussed that opiate use in the management of chronic pain is best managed by a single practitioner, such as a primary care provider or a pain specialist. We discussed adjunctive therapies such as heat, ice, and exercise, as well as non-opiate medications such as acetaminophen, NSAIDs, antidepressants, gabapentin, and pregabalin. I reiterated to Martín Jones that the most effective management of chronic pain involves a multimodal approach coordinated by a primary care provider and often includes physical therapy, cognitive behavioral therapy, and referrals to practitioners such as anesthesiologists trained in chronic pain management.     Pt endorses understanding and agreement. ED Course:   Initial assessment performed. The patients presenting problems have been discussed, and they are in agreement with the care plan formulated and outlined with them. I have encouraged them to ask questions as they arise throughout their visit. Medications Administered During ED Course:  Medications   acetaminophen (TYLENOL) tablet 975 mg (975 mg Oral Given 10/20/19 1719)   lidocaine (XYLOCAINE) 2 % viscous solution 15 mL (15 mL Mouth/Throat Given 10/20/19 1719)     Progress note:  Patient has been reassessed and reports feeling considerably better, has normal vital signs and feels comfortable going home. I think this is reasonable as no findings today suggest a life-threatening condition. DISPOSITION: DISCHARGE  The patient's results have been reviewed with patient and available family and/or caregiver. They verbally convey their understanding and agreement of the patient's signs, symptoms, diagnosis, treatment and prognosis and additionally agree to follow up as recommended in the discharge instructions or to return to the Emergency Department should the patient's condition change prior to their follow-up appointment. The patient and available family and/or caregiver verbally agree with the care plan and all of their questions have been answered. The discharge instructions have also been provided to the them with educational information regarding the patient's diagnosis as well a list of reasons why the patient would want to return to the ER prior to their follow-up appointment should any concerns arise, the patient's condition change or symptoms worsen. Dillon Dorman MD, MSc      Diagnosis     Clinical Impression:   1. Pain, dental    2. Infected dental carries        Attestation:  I personally performed the services described in this documentation on this date 10/20/2019 for patient Marybel Jarvis.   Dillon Dorman MD    Please note that this dictation was completed with [employer], the natue voice recognition software. Quite often unanticipated grammatical, syntax, homophones, and other interpretive errors are inadvertently transcribed by the computer software. Please disregard these errors. Please excuse any errors that have escaped final proofreading.

## 2019-10-20 NOTE — DISCHARGE INSTRUCTIONS
Emergency [address] by [facility]  [address] [facility], [address] [facility]  Open M, W, F: 8AM - 5PM and T, Th: 8AM-6PM  Phone: [phone], press 4  $70 for Emergency Care  $60 for first routine care, then pay by sliding scale based upon income. [facility] [address]  Phone: [phone]    [employer]  [address] [facility], [address]  Open Monday - Friday 8AM - 4:30 PM  Phone: [phone] of Dentistry Urgent [address] [facility], [address] [facility], [address] [facility] [address]   Phone: [phone] to confirm a time for emergency treatment   Pediatrics: [phone]   $75 per tooth, extractions only     [address], [address] [facility] [address], [address]nd Floor, [address] [place] [address] starting at 8:30 AM - 3 PM [address] [place] [address]. [place], [address] [address]   Phone [phone] or [phone]   Hours [id number] (extractions)   Simple tooth extraction: $60 per tooth, $55 per x-ray     [facility] Residents only, over the age of 25  Phone: [phone]. Leave message saying you need an appointment to register. Hours: Tuesday Evenings     [facility] (in [place])   [facility] AT [place] only   Phone: [phone], leave message saying you need an appointment to register   Hours: Wed 6-9p     Non-Urgent [facility]     [address] [facility] at [address] [facility]   Dental Clinic: [phone]   Oral Surgery Clinic: [phone]          Tooth and Gum Pain: Care Instructions  Your Care Instructions    The most common causes of dental pain are tooth decay and gum disease. Pain can also be caused by an infection of the tooth (abscess) or the gums.  Or you may have pain from a broken or cracked tooth. Other causes of pain include infection and damage to a tooth from nervous grinding of your teeth. A wisdom tooth can be painful when it is coming in but cannot break through the gum. It can also be painful when the tooth is only partway in and extra gum tissue has formed around it. The tissue can get inflamed (pericoronitis), and sometimes it gets infected. Prompt dental care can help find the cause of your toothache and keep the tooth from dying or gum disease from getting worse. Self-care at home may reduce your pain and discomfort. Follow-up care is a key part of your treatment and safety. Be sure to make and go to all appointments, and call your dentist or doctor if you are having problems. It's also a good idea to know your test results and keep a list of the medicines you take. How can you care for yourself at home? · To reduce pain and facial swelling, put an ice or cold pack on the outside of your cheek for 10 to 20 minutes at a time. Put a thin cloth between the ice and your skin. Do not use heat. · If your doctor prescribed antibiotics, take them as directed. Do not stop taking them just because you feel better. You need to take the full course of antibiotics. · Ask your doctor if you can take an over-the-counter pain medicine, such as acetaminophen (Tylenol), ibuprofen (Advil, Motrin), or naproxen (Aleve). Be safe with medicines. Read and follow all instructions on the label. · Avoid very hot, cold, or sweet foods and drinks if they increase your pain. · Rinse your mouth with warm salt water every 2 hours to help relieve pain and swelling. Mix 1 teaspoon of salt in 8 ounces of water. · Talk to your dentist about using special toothpaste for sensitive teeth. To reduce pain on contact with heat or cold or when brushing, brush with this toothpaste regularly or rub a small amount of the paste on the sensitive area with a clean finger 2 or 3 times a day. Floss gently between your teeth.   · Do not smoke or use spit tobacco. Tobacco use can make gum problems worse, decreases your ability to fight infection in your gums, and delays healing. If you need help quitting, talk to your doctor about stop-smoking programs and medicines. These can increase your chances of quitting for good. When should you call for help? Call 911 anytime you think you may need emergency care. For example, call if:    · You have trouble breathing.    Call your dentist or doctor now or seek immediate medical care if:    · You have signs of infection, such as:  ? Increased pain, swelling, warmth, or redness. ? Red streaks leading from the area. ? Pus draining from the area. ? A fever.    Watch closely for changes in your health, and be sure to contact your doctor if:    · You do not get better as expected. Where can you learn more? Go to http://asael-sloan.info/. Enter [id number] [id number] in the search box to learn more about \"Tooth and Gum Pain: Care Instructions. \"  Current as of: October 3, 2018  Content Version: 12.2  © 3562-8539 [employer]. Care instructions adapted under license by [employer] (which disclaims liability or warranty for this information). If you have questions about a medical condition or this instruction, always ask your healthcare professional. [address] any warranty or liability for your use of this information.

## 2019-10-21 ENCOUNTER — HOSPITAL ENCOUNTER (EMERGENCY)
Age: 34
Discharge: HOME | End: 2019-10-21
Attending: EMERGENCY MEDICINE | Admitting: EMERGENCY MEDICINE
Payer: SELF-PAY

## 2019-10-21 VITALS
DIASTOLIC BLOOD PRESSURE: 102 MMHG | OXYGEN SATURATION: 99 % | SYSTOLIC BLOOD PRESSURE: 169 MMHG | BODY MASS INDEX: 34.01 KG/M2 | WEIGHT: 204.15 LBS | TEMPERATURE: 98 F | HEIGHT: 65 IN | HEART RATE: 108 BPM | RESPIRATION RATE: 20 BRPM

## 2019-10-21 DIAGNOSIS — K08.89 PAIN, DENTAL: Primary | ICD-10-CM

## 2019-10-21 PROCEDURE — 99283 EMERGENCY DEPT VISIT LOW MDM: CPT

## 2019-10-21 PROCEDURE — 99282 EMERGENCY DEPT VISIT SF MDM: CPT

## 2019-10-21 RX ORDER — BUTALBITAL, ACETAMINOPHEN AND CAFFEINE 300; 40; 50 MG/1; MG/1; MG/1
1 CAPSULE ORAL
Qty: 20 CAP | Refills: 0 | Status: SHIPPED | OUTPATIENT
Start: 2019-10-21 | End: 2019-11-24

## 2019-10-21 RX ORDER — BUTALBITAL, ACETAMINOPHEN AND CAFFEINE 50; 325; 40 MG/1; MG/1; MG/1
1 TABLET ORAL
Status: DISCONTINUED | OUTPATIENT
Start: 2019-10-21 | End: 2019-10-21 | Stop reason: HOSPADM

## 2019-10-21 NOTE — ED PROVIDER NOTES
EMERGENCY DEPARTMENT HISTORY AND PHYSICAL EXAM      Date: 10/21/2019  Patient Name: Galilea Cardoso    History of Presenting Illness     Chief Complaint   Patient presents with    Dental Pain     Patient has been seen here twice in the last 24 hours for dental pain. She was given antibiotics and knows she was told that it would take time for them to kick in but the pain is unbearable. History Provided By: Patient and Previous ER notes    HPI: Galilea Cardoso, 37 y.o. female presents to the ED with cc of dental pain. The patient is here for her third ER visit since yesterday for dental pain. She originally was seen at [facility], where a CT was performed. There was no sign of abscess or acute process. She was prescribed clindamycin and Voltaren. She states that the Voltaren is not working. She was seen in our emergency department for her second visit and was given lidocaine. She is under the management plan and her  reveals a high risk for overdose. She denies fever or chills. Her pain currently is a 10 out of 10, involving the left upper jaw. She denies shortness of breath, chest pain or headache. There are no other complaints, changes, or physical findings at this time. PCP: Jefferson Lassiter NP    Current Facility-Administered Medications on File Prior to Encounter   Medication Dose Route Frequency Provider Last Rate Last Dose    [COMPLETED] acetaminophen (TYLENOL) tablet 975 mg  975 mg Oral NOW Remington Marc MD   975 mg at 10/20/19 1719    [COMPLETED] lidocaine (XYLOCAINE) 2 % viscous solution 15 mL  15 mL Mouth/Throat NOW Remington Marc MD   15 mL at 10/20/19 1719     Current Outpatient Medications on File Prior to Encounter   Medication Sig Dispense Refill    metFORMIN (GLUCOPHAGE) 1,000 mg tablet Take 1,000 mg by mouth two (2) times daily (with meals).          Past History     Past Medical History:  Past Medical History:   Diagnosis Date    Abnormal Pap smear     abnormal pap at 15 y/o normal since     [facility]    [facility]    Diabetes [facility])     Diabetes mellitus     Gestational Diabetes with last two pregnancies-diet controlled       Past Surgical History:  Past Surgical History:   Procedure Laterality Date    HX  SECTION      HX CHOLECYSTECTOMY      HX OTHER SURGICAL      cholecystectomy 2002       Family History:  Family History   Problem Relation Age of Onset    Diabetes Father     Hypertension Father     Stroke Father     Kidney Disease Father        Social History:  Social History     Tobacco Use    Smoking status: Never Smoker    Smokeless tobacco: Never Used   Substance Use Topics    Alcohol use: Yes     Comment: Socially    Drug use: No       Allergies: Allergies   Allergen Reactions    Amoxicillin Rash    Tramadol Hives         Review of Systems   Review of Systems   Constitutional: Negative for chills and fever. HENT: Positive for dental problem. Eyes: Negative. Respiratory: Negative for chest tightness. Cardiovascular: Negative for chest pain. Gastrointestinal: Negative for abdominal pain. Endocrine: Negative for heat intolerance. Genitourinary: Negative. Musculoskeletal: Negative for back pain. Skin: Negative for rash. Allergic/Immunologic: Negative for immunocompromised state. Neurological: Negative for dizziness. Hematological: Does not bruise/bleed easily. Psychiatric/Behavioral: Negative. All other systems reviewed and are negative. Physical Exam   Physical Exam   Constitutional: She is oriented to person, place, and time. She appears well-developed and well-nourished. She appears distressed. HENT:   Head: Normocephalic. Dental caries   Eyes: Pupils are equal, round, and reactive to light. EOM are normal.   Neck: Normal range of motion. Neck supple. Cardiovascular: Normal rate, regular rhythm, normal heart sounds and intact distal pulses.    Pulmonary/Chest: Effort normal and breath sounds normal. No respiratory distress. Abdominal: Soft. Bowel sounds are normal. There is no tenderness. Musculoskeletal: Normal range of motion. She exhibits no edema. Neurological: She is alert and oriented to person, place, and time. Skin: Skin is warm and dry. Psychiatric: She has a normal mood and affect. Her behavior is normal.   Nursing note and vitals reviewed. Diagnostic Study Results     Labs -   No results found for this or any previous visit (from the past 12 hour(s)). Radiologic Studies -   No orders to display     CT Results  (Last 48 hours)    None        CXR Results  (Last 48 hours)    None          Medical Decision Making   I am the first provider for this patient. I reviewed the vital signs, available nursing notes, past medical history, past surgical history, family history and social history. Vital Signs-Reviewed the patient's vital signs. Patient Vitals for the past 12 hrs:   Temp Pulse Resp BP SpO2   10/21/19 0615 98 °F (36.7 °C) (!) 108 20 (!) 169/102 99 %         Records Reviewed: Nursing Notes, Old Medical Records, Previous Radiology Studies and Previous Laboratory Studies    Provider Notes (Medical Decision Making):   Dental pain, drug-seeking behavior    ED Course:   Initial assessment performed. The patients presenting problems have been discussed, and they are in agreement with the care plan formulated and outlined with them. I have encouraged them to ask questions as they arise throughout their visit. Management plan review:    Patient's management plan was reviewed. Her  was reviewed. She was advised that we are limited in terms of options given her risk for overdose. She was advised to follow-up and return to ER if worse    Progress note: The patient left without receiving her paperwork. The charge nurse is going to call her and tell her that she can pick her discharge papers that as needed.        Critical Care Time: 0    Disposition:  home    PLAN:  1. Discharge Medication List as of 10/21/2019  7:59 AM      START taking these medications    Details   butalbital-acetaminophen-caff (FIORICET) -40 mg per capsule Take 1 Cap by mouth every four (4) hours as needed for Pain., Normal, Disp-20 Cap, R-0         CONTINUE these medications which have NOT CHANGED    Details   metFORMIN (GLUCOPHAGE) 1,000 mg tablet Take 1,000 mg by mouth two (2) times daily (with meals). , Historical Med           2. Follow-up Information     Follow up With Specialties Details Why Contact Info    Iveth Martin NP Nurse Practitioner In 2 days As needed [address]  [phone]      See your dentist  In 2 days      [facility] [place] [facility] EM[facility] Emergency Medicine  If symptoms worsen [address] [facility]  [address] [facility]  [phone]        Return to ED if worse     Diagnosis     Clinical Impression:   1. Pain, dental        Attestations:    Pilar Bergman MD    Please note that this dictation was completed with [employer], the computer voice recognition software. Quite often unanticipated grammatical, syntax, homophones, and other interpretive errors are inadvertently transcribed by the computer software. Please disregard these errors. Please excuse any errors that have escaped final proofreading. Thank you.

## 2019-10-21 NOTE — ED NOTES
Bedside and Verbal shift change report given to Alexis Bill RN (oncoming nurse) by Bj Villalba (offgoing nurse). Report included the following information SBAR, ED Summary, MAR and Recent Results.

## 2019-10-21 NOTE — DISCHARGE INSTRUCTIONS
Patient Education        Tooth and Gum Pain: Care Instructions  Your Care Instructions    The most common causes of dental pain are tooth decay and gum disease. Pain can also be caused by an infection of the tooth (abscess) or the gums. Or you may have pain from a broken or cracked tooth. Other causes of pain include infection and damage to a tooth from nervous grinding of your teeth. A wisdom tooth can be painful when it is coming in but cannot break through the gum. It can also be painful when the tooth is only partway in and extra gum tissue has formed around it. The tissue can get inflamed (pericoronitis), and sometimes it gets infected. Prompt dental care can help find the cause of your toothache and keep the tooth from dying or gum disease from getting worse. Self-care at home may reduce your pain and discomfort. Follow-up care is a key part of your treatment and safety. Be sure to make and go to all appointments, and call your dentist or doctor if you are having problems. It's also a good idea to know your test results and keep a list of the medicines you take. How can you care for yourself at home? · To reduce pain and facial swelling, put an ice or cold pack on the outside of your cheek for 10 to 20 minutes at a time. Put a thin cloth between the ice and your skin. Do not use heat. · If your doctor prescribed antibiotics, take them as directed. Do not stop taking them just because you feel better. You need to take the full course of antibiotics. · Ask your doctor if you can take an over-the-counter pain medicine, such as acetaminophen (Tylenol), ibuprofen (Advil, Motrin), or naproxen (Aleve). Be safe with medicines. Read and follow all instructions on the label. · Avoid very hot, cold, or sweet foods and drinks if they increase your pain. · Rinse your mouth with warm salt water every 2 hours to help relieve pain and swelling. Mix 1 teaspoon of salt in 8 ounces of water.   · Talk to your dentist about using special toothpaste for sensitive teeth. To reduce pain on contact with heat or cold or when brushing, brush with this toothpaste regularly or rub a small amount of the paste on the sensitive area with a clean finger 2 or 3 times a day. Floss gently between your teeth. · Do not smoke or use spit tobacco. Tobacco use can make gum problems worse, decreases your ability to fight infection in your gums, and delays healing. If you need help quitting, talk to your doctor about stop-smoking programs and medicines. These can increase your chances of quitting for good. When should you call for help? Call 911 anytime you think you may need emergency care. For example, call if:    · You have trouble breathing.    Call your dentist or doctor now or seek immediate medical care if:    · You have signs of infection, such as:  ? Increased pain, swelling, warmth, or redness. ? Red streaks leading from the area. ? Pus draining from the area. ? A fever.    Watch closely for changes in your health, and be sure to contact your doctor if:    · You do not get better as expected. Where can you learn more? Go to http://asael-sloan.info/. Enter 0363 [id number] in the search box to learn more about \"Tooth and Gum Pain: Care Instructions. \"  Current as of: October 3, 2018  Content Version: 12.2  © 9944-0265 [employer], Incorporated. Care instructions adapted under license by [employer] (which disclaims liability or warranty for this information). If you have questions about a medical condition or this instruction, always ask your healthcare professional. [place] [address] any warranty or liability for your use of this information.

## 2019-10-25 ENCOUNTER — HOSPITAL ENCOUNTER (EMERGENCY)
Age: 34
Discharge: HOME | End: 2019-10-25
Attending: EMERGENCY MEDICINE | Admitting: EMERGENCY MEDICINE
Payer: SELF-PAY

## 2019-10-25 VITALS
OXYGEN SATURATION: 95 % | HEART RATE: 92 BPM | DIASTOLIC BLOOD PRESSURE: 90 MMHG | TEMPERATURE: 98.4 F | RESPIRATION RATE: 18 BRPM | WEIGHT: 210 LBS | BODY MASS INDEX: 34.99 KG/M2 | HEIGHT: 65 IN | SYSTOLIC BLOOD PRESSURE: 133 MMHG

## 2019-10-25 DIAGNOSIS — E11.65 UNCONTROLLED TYPE 2 DIABETES MELLITUS WITH HYPERGLYCEMIA (HCC): Primary | ICD-10-CM

## 2019-10-25 DIAGNOSIS — E11.42 DIABETIC POLYNEUROPATHY ASSOCIATED WITH TYPE 2 DIABETES MELLITUS (HCC): ICD-10-CM

## 2019-10-25 LAB
ALBUMIN SERPL-MCNC: 3.5 G/DL (ref 3.5–5)
ALBUMIN/GLOB SERPL: 0.9 {RATIO} (ref 1.1–2.2)
ALP SERPL-CCNC: 93 U/L (ref 45–117)
ALT SERPL-CCNC: 15 U/L (ref 12–78)
ANION GAP SERPL CALC-SCNC: 8 MMOL/L (ref 5–15)
APPEARANCE UR: CLEAR
AST SERPL-CCNC: 6 U/L (ref 15–37)
BACTERIA URNS QL MICRO: ABNORMAL /HPF
BASOPHILS # BLD: 0 K/UL (ref 0–0.1)
BASOPHILS NFR BLD: 0 % (ref 0–1)
BILIRUB SERPL-MCNC: 0.5 MG/DL (ref 0.2–1)
BILIRUB UR QL: NEGATIVE
BUN SERPL-MCNC: 7 MG/DL (ref 6–20)
BUN/CREAT SERPL: 8 (ref 12–20)
CALCIUM SERPL-MCNC: 9.1 MG/DL (ref 8.5–10.1)
CHLORIDE SERPL-SCNC: 100 MMOL/L (ref 97–108)
CO2 SERPL-SCNC: 25 MMOL/L (ref 21–32)
COLOR UR: ABNORMAL
COMMENT, HOLDF: NORMAL
CREAT SERPL-MCNC: 0.85 MG/DL (ref 0.55–1.02)
DIFFERENTIAL METHOD BLD: NORMAL
EOSINOPHIL # BLD: 0 K/UL (ref 0–0.4)
EOSINOPHIL NFR BLD: 1 % (ref 0–7)
EPITH CASTS URNS QL MICRO: ABNORMAL /LPF
ERYTHROCYTE [DISTWIDTH] IN BLOOD BY AUTOMATED COUNT: 11.6 % (ref 11.5–14.5)
GLOBULIN SER CALC-MCNC: 3.9 G/DL (ref 2–4)
GLUCOSE BLD STRIP.AUTO-MCNC: 408 MG/DL (ref 65–100)
GLUCOSE SERPL-MCNC: 394 MG/DL (ref 65–100)
GLUCOSE UR STRIP.AUTO-MCNC: >1000 MG/DL
HCG UR QL: NEGATIVE
HCT VFR BLD AUTO: 40.9 % (ref 35–47)
HGB BLD-MCNC: 13.1 G/DL (ref 11.5–16)
HGB UR QL STRIP: NEGATIVE
IMM GRANULOCYTES # BLD AUTO: 0 K/UL (ref 0–0.04)
IMM GRANULOCYTES NFR BLD AUTO: 0 % (ref 0–0.5)
KETONES UR QL STRIP.AUTO: NEGATIVE MG/DL
LEUKOCYTE ESTERASE UR QL STRIP.AUTO: ABNORMAL
LYMPHOCYTES # BLD: 2.1 K/UL (ref 0.8–3.5)
LYMPHOCYTES NFR BLD: 26 % (ref 12–49)
MCH RBC QN AUTO: 27.8 PG (ref 26–34)
MCHC RBC AUTO-ENTMCNC: 32 G/DL (ref 30–36.5)
MCV RBC AUTO: 86.8 FL (ref 80–99)
MONOCYTES # BLD: 0.5 K/UL (ref 0–1)
MONOCYTES NFR BLD: 7 % (ref 5–13)
NEUTS SEG # BLD: 5.3 K/UL (ref 1.8–8)
NEUTS SEG NFR BLD: 66 % (ref 32–75)
NITRITE UR QL STRIP.AUTO: NEGATIVE
NRBC # BLD: 0 K/UL (ref 0–0.01)
NRBC BLD-RTO: 0 PER 100 WBC
PH UR STRIP: 6.5 [PH] (ref 5–8)
PLATELET # BLD AUTO: 174 K/UL (ref 150–400)
PMV BLD AUTO: 11.6 FL (ref 8.9–12.9)
POTASSIUM SERPL-SCNC: 4 MMOL/L (ref 3.5–5.1)
PROT SERPL-MCNC: 7.4 G/DL (ref 6.4–8.2)
PROT UR STRIP-MCNC: NEGATIVE MG/DL
RBC # BLD AUTO: 4.71 M/UL (ref 3.8–5.2)
RBC #/AREA URNS HPF: ABNORMAL /HPF (ref 0–5)
SAMPLES BEING HELD,HOLD: NORMAL
SERVICE CMNT-IMP: ABNORMAL
SODIUM SERPL-SCNC: 133 MMOL/L (ref 136–145)
SP GR UR REFRACTOMETRY: 1.03 (ref 1–1.03)
UR CULT HOLD, URHOLD: NORMAL
UROBILINOGEN UR QL STRIP.AUTO: 0.2 EU/DL (ref 0.2–1)
WBC # BLD AUTO: 8 K/UL (ref 3.6–11)
WBC URNS QL MICRO: ABNORMAL /HPF (ref 0–4)

## 2019-10-25 PROCEDURE — 80053 COMPREHEN METABOLIC PANEL: CPT

## 2019-10-25 PROCEDURE — 85025 COMPLETE CBC W/AUTO DIFF WBC: CPT

## 2019-10-25 PROCEDURE — 81001 URINALYSIS AUTO W/SCOPE: CPT

## 2019-10-25 PROCEDURE — 99284 EMERGENCY DEPT VISIT MOD MDM: CPT

## 2019-10-25 PROCEDURE — 82962 GLUCOSE BLOOD TEST: CPT

## 2019-10-25 PROCEDURE — 74011250637 HC RX REV CODE- 250/637: Performed by: NURSE PRACTITIONER

## 2019-10-25 PROCEDURE — 81025 URINE PREGNANCY TEST: CPT

## 2019-10-25 PROCEDURE — 36415 COLL VENOUS BLD VENIPUNCTURE: CPT

## 2019-10-25 RX ORDER — METFORMIN HYDROCHLORIDE 1000 MG/1
1000 TABLET ORAL 2 TIMES DAILY WITH MEALS
Qty: 60 TAB | Refills: 0 | Status: SHIPPED | OUTPATIENT
Start: 2019-10-25

## 2019-10-25 RX ORDER — METFORMIN HYDROCHLORIDE 500 MG/1
1000 TABLET ORAL 2 TIMES DAILY WITH MEALS
Status: DISCONTINUED | OUTPATIENT
Start: 2019-10-25 | End: 2019-10-25 | Stop reason: HOSPADM

## 2019-10-25 RX ADMIN — METFORMIN HYDROCHLORIDE 1000 MG: 500 TABLET ORAL at 08:26

## 2019-10-25 NOTE — ED PROVIDER NOTES
37year old female presents with with diabetic medication refill, and BLE neuropathy. She is a poorly controlled diabetic and ran out of her Metformin for the better past of the week. She comments on being at work this morning and feeling a bit \"shaky\" and lethargic. He supervisor told her to come to the ED. She stated she tried to contact her PCP for medication refills, however they would not refill them until she was seen, and due to her work schedule, she's been unable to get an appointment. Reviewing her chart, her last documented A1C was in , and noted to be around 13. She's been seen multiple times over the span of several years with poorly controlled DMII, her glucose has been from mid-300's to 400, usually with glucosuria and ketonuria. She denies any N/V/D, abdominal pain, changes in mentation or excessive weight loss. Of note, she's been seen in the ED over 15 times within the last year, most recently within this week.                  Past Medical History:   Diagnosis Date    Abnormal Pap smear     abnormal pap at 13 y/o normal since     [facility] 2015    [facility]    Diabetes [facility])     Diabetes mellitus     Gestational Diabetes with last two pregnancies-diet controlled       Past Surgical History:   Procedure Laterality Date    HX  SECTION      HX CHOLECYSTECTOMY      HX OTHER SURGICAL      cholecystectomy          Family History:   Problem Relation Age of Onset    Diabetes Father     Hypertension Father     Stroke Father     Kidney Disease Father        Social History     Socioeconomic History    Marital status: SINGLE     Spouse name: Not on file    Number of children: Not on file    Years of education: Not on file    Highest education level: Not on file   Occupational History    Not on file   Social Needs    Financial resource strain: Not on file    Food insecurity:     Worry: Not on file     Inability: Not on file    Transportation needs: Medical: Not on file     Non-medical: Not on file   Tobacco Use    Smoking status: Never Smoker    Smokeless tobacco: Never Used   Substance and Sexual Activity    Alcohol use: Yes     Comment: Socially    Drug use: No    Sexual activity: Yes     Birth control/protection: None   Lifestyle    Physical activity:     Days per week: Not on file     Minutes per session: Not on file    Stress: Not on file   Relationships    Social connections:     Talks on phone: Not on file     Gets together: Not on file     Attends Voodoo service: Not on file     Active member of club or organization: Not on file     Attends meetings of clubs or organizations: Not on file     Relationship status: Not on file    Intimate partner violence:     Fear of current or ex partner: Not on file     Emotionally abused: Not on file     Physically abused: Not on file     Forced sexual activity: Not on file   Other Topics Concern    Not on file   Social History Narrative    Not on file         ALLERGIES: Amoxicillin and Tramadol    Review of Systems   Constitutional: Negative for chills, diaphoresis, fatigue, fever and unexpected weight change. Eyes: Negative for visual disturbance. Respiratory: Negative for chest tightness and shortness of breath. Cardiovascular: Negative for chest pain, palpitations and leg swelling. Gastrointestinal: Negative for abdominal pain, diarrhea, nausea and vomiting. Endocrine: Negative for polydipsia, polyphagia and polyuria. Genitourinary: Negative for dysuria and flank pain. Musculoskeletal: Positive for myalgias. Negative for arthralgias. Skin: Negative for color change. Neurological: Positive for numbness. Negative for dizziness, tremors, syncope, speech difficulty and headaches. Psychiatric/Behavioral: Negative for agitation.        Vitals:    10/25/19 0756   BP: 135/88   Pulse: (!) 107   Resp: 15   Temp: 98.8 °F (37.1 °C)   SpO2: 100%   Weight: 95.3 kg (210 lb)   Height: 5' 5\" (1.651 m)            Physical Exam   Constitutional: She is oriented to person, place, and time. Vital signs are normal. She appears well-developed and well-nourished. She is cooperative. She does not appear ill. No distress. Well appearing    HENT:   Head: Normocephalic and atraumatic. Eyes: Pupils are equal, round, and reactive to light. Conjunctivae and EOM are normal.   Neck: Normal range of motion. Cardiovascular: Normal rate, regular rhythm, normal heart sounds and intact distal pulses. Pulmonary/Chest: Effort normal and breath sounds normal. No respiratory distress. She has no wheezes. Musculoskeletal: Normal range of motion. She exhibits no deformity. Neurological: She is alert and oriented to person, place, and time. Coordination normal.   Skin: Skin is warm and dry. She is not diaphoretic. Psychiatric: She has a normal mood and affect. Her behavior is normal. Judgment and thought content normal.   Nursing note and vitals reviewed. MDM  Number of Diagnoses or Management Options  Diabetic polyneuropathy associated with type 2 diabetes mellitus ([facility] [address]): established and worsening  Uncontrolled type 2 diabetes mellitus with hyperglycemia ([facility] [address]): established and worsening  Diagnosis management comments:     Poorly controlled diabetic with neuropathic chronic pain. No physical signs of DKA or dehydration on examination. Her laboratory work is without ketonuria, K+ deficits and there is no acidosis. Highly encouraged compliance with medication regimen. Courtesy medication refill given.          Amount and/or Complexity of Data Reviewed  Clinical lab tests: ordered and reviewed  Tests in the medicine section of CPT®: ordered and reviewed  Review and summarize past medical records: yes           Procedures

## 2019-10-25 NOTE — ED TRIAGE NOTES
Patient arrives from work after feeling abdominally fatigued. She reports being diabetic and running out of her medication a few days ago. She report some bilateral foot tingling and feels like she cannot \"wake up\". Patient drove herself here. BS in [place].  [address]

## 2019-10-25 NOTE — LETTER
NOTIFICATION RETURN TO WORK / SCHOOL 
 
10/25/2019 9:29 AM 
 
Ms. Marlo Chris [address] [facility] [address] To Whom It May Concern: 
 
Marlo Chris is currently under the care of [facility]. She will return to work/school on: 10/26/2019 Marlo Chris may return to work/school with the following restrictions: None If there are questions or concerns please have the patient contact our office.  
 
 
 
Sincerely, 
 
 
Nathalia Junior NP

## 2019-11-24 ENCOUNTER — APPOINTMENT (OUTPATIENT)
Dept: GENERAL RADIOLOGY | Age: 34
End: 2019-11-24
Attending: EMERGENCY MEDICINE
Payer: SELF-PAY

## 2019-11-24 ENCOUNTER — APPOINTMENT (OUTPATIENT)
Dept: ULTRASOUND IMAGING | Age: 34
End: 2019-11-24
Attending: EMERGENCY MEDICINE
Payer: SELF-PAY

## 2019-11-24 ENCOUNTER — HOSPITAL ENCOUNTER (EMERGENCY)
Age: 34
Discharge: LEFT BEFORE DISCHARGE | End: 2019-11-24
Attending: EMERGENCY MEDICINE
Payer: SELF-PAY

## 2019-11-24 VITALS
HEART RATE: 96 BPM | TEMPERATURE: 98.4 F | BODY MASS INDEX: 32.73 KG/M2 | OXYGEN SATURATION: 100 % | DIASTOLIC BLOOD PRESSURE: 63 MMHG | WEIGHT: 196.43 LBS | SYSTOLIC BLOOD PRESSURE: 109 MMHG | RESPIRATION RATE: 18 BRPM | HEIGHT: 65 IN

## 2019-11-24 DIAGNOSIS — R73.9 HYPERGLYCEMIA: ICD-10-CM

## 2019-11-24 DIAGNOSIS — F11.10 OPIOID ABUSE (HCC): ICD-10-CM

## 2019-11-24 DIAGNOSIS — F11.29 OPIOID DEPENDENCE WITH OPIOID-INDUCED DISORDER (HCC): ICD-10-CM

## 2019-11-24 DIAGNOSIS — R10.9 FLANK PAIN: Primary | ICD-10-CM

## 2019-11-24 LAB
ALBUMIN SERPL-MCNC: 3.6 G/DL (ref 3.5–5)
ALBUMIN/GLOB SERPL: 0.9 {RATIO} (ref 1.1–2.2)
ALP SERPL-CCNC: 93 U/L (ref 45–117)
ALT SERPL-CCNC: 16 U/L (ref 12–78)
AMPHET UR QL SCN: NEGATIVE
ANION GAP SERPL CALC-SCNC: 8 MMOL/L (ref 5–15)
APPEARANCE UR: CLEAR
AST SERPL-CCNC: 12 U/L (ref 15–37)
BARBITURATES UR QL SCN: NEGATIVE
BASOPHILS # BLD: 0 K/UL (ref 0–0.1)
BASOPHILS NFR BLD: 0 % (ref 0–1)
BENZODIAZ UR QL: NEGATIVE
BILIRUB SERPL-MCNC: 0.6 MG/DL (ref 0.2–1)
BILIRUB UR QL: NEGATIVE
BUN SERPL-MCNC: 4 MG/DL (ref 6–20)
BUN/CREAT SERPL: 5 (ref 12–20)
CALCIUM SERPL-MCNC: 9.1 MG/DL (ref 8.5–10.1)
CANNABINOIDS UR QL SCN: NEGATIVE
CHLORIDE SERPL-SCNC: 100 MMOL/L (ref 97–108)
CO2 SERPL-SCNC: 27 MMOL/L (ref 21–32)
COCAINE UR QL SCN: NEGATIVE
COLOR UR: ABNORMAL
CREAT SERPL-MCNC: 0.81 MG/DL (ref 0.55–1.02)
DIFFERENTIAL METHOD BLD: ABNORMAL
DRUG SCRN COMMENT,DRGCM: ABNORMAL
EOSINOPHIL # BLD: 0 K/UL (ref 0–0.4)
EOSINOPHIL NFR BLD: 0 % (ref 0–7)
ERYTHROCYTE [DISTWIDTH] IN BLOOD BY AUTOMATED COUNT: 11.7 % (ref 11.5–14.5)
GLOBULIN SER CALC-MCNC: 3.9 G/DL (ref 2–4)
GLUCOSE SERPL-MCNC: 374 MG/DL (ref 65–100)
GLUCOSE UR STRIP.AUTO-MCNC: >1000 MG/DL
HCG UR QL: NEGATIVE
HCT VFR BLD AUTO: 44.7 % (ref 35–47)
HGB BLD-MCNC: 14.8 G/DL (ref 11.5–16)
HGB UR QL STRIP: NEGATIVE
IMM GRANULOCYTES # BLD AUTO: 0 K/UL (ref 0–0.04)
IMM GRANULOCYTES NFR BLD AUTO: 0 % (ref 0–0.5)
KETONES UR QL STRIP.AUTO: 40 MG/DL
LEUKOCYTE ESTERASE UR QL STRIP.AUTO: NEGATIVE
LYMPHOCYTES # BLD: 1.5 K/UL (ref 0.8–3.5)
LYMPHOCYTES NFR BLD: 18 % (ref 12–49)
MCH RBC QN AUTO: 28 PG (ref 26–34)
MCHC RBC AUTO-ENTMCNC: 33.1 G/DL (ref 30–36.5)
MCV RBC AUTO: 84.5 FL (ref 80–99)
METHADONE UR QL: NEGATIVE
MONOCYTES # BLD: 0.3 K/UL (ref 0–1)
MONOCYTES NFR BLD: 4 % (ref 5–13)
NEUTS SEG # BLD: 6.3 K/UL (ref 1.8–8)
NEUTS SEG NFR BLD: 78 % (ref 32–75)
NITRITE UR QL STRIP.AUTO: NEGATIVE
NRBC # BLD: 0 K/UL (ref 0–0.01)
NRBC BLD-RTO: 0 PER 100 WBC
OPIATES UR QL: POSITIVE
PCP UR QL: NEGATIVE
PH UR STRIP: 5 [PH] (ref 5–8)
PLATELET # BLD AUTO: 283 K/UL (ref 150–400)
PMV BLD AUTO: 10.3 FL (ref 8.9–12.9)
POTASSIUM SERPL-SCNC: 3.8 MMOL/L (ref 3.5–5.1)
PROT SERPL-MCNC: 7.5 G/DL (ref 6.4–8.2)
PROT UR STRIP-MCNC: NEGATIVE MG/DL
RBC # BLD AUTO: 5.29 M/UL (ref 3.8–5.2)
SODIUM SERPL-SCNC: 135 MMOL/L (ref 136–145)
SP GR UR REFRACTOMETRY: 1.01 (ref 1–1.03)
UROBILINOGEN UR QL STRIP.AUTO: 0.2 EU/DL (ref 0.2–1)
WBC # BLD AUTO: 8.2 K/UL (ref 3.6–11)

## 2019-11-24 PROCEDURE — 76770 US EXAM ABDO BACK WALL COMP: CPT

## 2019-11-24 PROCEDURE — 96374 THER/PROPH/DIAG INJ IV PUSH: CPT

## 2019-11-24 PROCEDURE — 99284 EMERGENCY DEPT VISIT MOD MDM: CPT

## 2019-11-24 PROCEDURE — 85025 COMPLETE CBC W/AUTO DIFF WBC: CPT

## 2019-11-24 PROCEDURE — 80307 DRUG TEST PRSMV CHEM ANLYZR: CPT

## 2019-11-24 PROCEDURE — 96375 TX/PRO/DX INJ NEW DRUG ADDON: CPT

## 2019-11-24 PROCEDURE — 81003 URINALYSIS AUTO W/O SCOPE: CPT

## 2019-11-24 PROCEDURE — 81025 URINE PREGNANCY TEST: CPT

## 2019-11-24 PROCEDURE — 74011250636 HC RX REV CODE- 250/636: Performed by: EMERGENCY MEDICINE

## 2019-11-24 PROCEDURE — 74018 RADEX ABDOMEN 1 VIEW: CPT

## 2019-11-24 PROCEDURE — 74011250637 HC RX REV CODE- 250/637: Performed by: EMERGENCY MEDICINE

## 2019-11-24 PROCEDURE — 80053 COMPREHEN METABOLIC PANEL: CPT

## 2019-11-24 PROCEDURE — 36415 COLL VENOUS BLD VENIPUNCTURE: CPT

## 2019-11-24 RX ORDER — METHOCARBAMOL 750 MG/1
750 TABLET, FILM COATED ORAL
Status: COMPLETED | OUTPATIENT
Start: 2019-11-24 | End: 2019-11-24

## 2019-11-24 RX ORDER — METHOCARBAMOL 750 MG/1
750 TABLET, FILM COATED ORAL 4 TIMES DAILY
Status: DISCONTINUED | OUTPATIENT
Start: 2019-11-24 | End: 2019-11-24

## 2019-11-24 RX ORDER — KETOROLAC TROMETHAMINE 30 MG/ML
30 INJECTION, SOLUTION INTRAMUSCULAR; INTRAVENOUS
Status: COMPLETED | OUTPATIENT
Start: 2019-11-24 | End: 2019-11-24

## 2019-11-24 RX ORDER — ONDANSETRON 2 MG/ML
4 INJECTION INTRAMUSCULAR; INTRAVENOUS
Status: COMPLETED | OUTPATIENT
Start: 2019-11-24 | End: 2019-11-24

## 2019-11-24 RX ADMIN — ONDANSETRON 4 MG: 2 INJECTION INTRAMUSCULAR; INTRAVENOUS at 10:40

## 2019-11-24 RX ADMIN — KETOROLAC TROMETHAMINE 30 MG: 30 INJECTION, SOLUTION INTRAMUSCULAR at 10:34

## 2019-11-24 RX ADMIN — METHOCARBAMOL TABLETS 750 MG: 750 TABLET, COATED ORAL at 10:40

## 2019-11-24 NOTE — DISCHARGE INSTRUCTIONS
Patient Education        Abdominal Pain: Care Instructions  Your Care Instructions    Abdominal pain has many possible causes. Some aren't serious and get better on their own in a few days. Others need more testing and treatment. If your pain continues or gets worse, you need to be rechecked and may need more tests to find out what is wrong. You may need surgery to correct the problem. Don't ignore new symptoms, such as fever, nausea and vomiting, urination problems, pain that gets worse, and dizziness. These may be signs of a more serious problem. Your doctor may have recommended a follow-up visit in the next 8 to 12 hours. If you are not getting better, you may need more tests or treatment. The doctor has checked you carefully, but problems can develop later. If you notice any problems or new symptoms, get medical treatment right away. Follow-up care is a key part of your treatment and safety. Be sure to make and go to all appointments, and call your doctor if you are having problems. It's also a good idea to know your test results and keep a list of the medicines you take. How can you care for yourself at home? · Rest until you feel better. · To prevent dehydration, drink plenty of fluids, enough so that your urine is light yellow or clear like water. Choose water and other caffeine-free clear liquids until you feel better. If you have kidney, heart, or liver disease and have to limit fluids, talk with your doctor before you increase the amount of fluids you drink. · If your stomach is upset, eat mild foods, such as rice, dry toast or crackers, bananas, and applesauce. Try eating several small meals instead of two or three large ones. · Wait until 48 hours after all symptoms have gone away before you have spicy foods, alcohol, and drinks that contain caffeine. · Do not eat foods that are high in fat. · Avoid anti-inflammatory medicines such as aspirin, ibuprofen (Advil, Motrin), and naproxen (Aleve). These can cause stomach upset. Talk to your doctor if you take daily aspirin for another health problem. When should you call for help? Call 911 anytime you think you may need emergency care. For example, call if:    · You passed out (lost consciousness).     · You pass maroon or very bloody stools.     · You vomit blood or what looks like coffee grounds.     · You have new, severe belly pain.    Call your doctor now or seek immediate medical care if:    · Your pain gets worse, especially if it becomes focused in one area of your belly.     · You have a new or higher fever.     · Your stools are black and look like tar, or they have streaks of blood.     · You have unexpected vaginal bleeding.     · You have symptoms of a urinary tract infection. These may include:  ? Pain when you urinate. ? Urinating more often than usual.  ? Blood in your urine.     · You are dizzy or lightheaded, or you feel like you may faint.    Watch closely for changes in your health, and be sure to contact your doctor if:    · You are not getting better after 1 day (24 hours). Where can you learn more? Go to http://asael-sloan.info/. Enter G639 in the search box to learn more about \"Abdominal Pain: Care Instructions. \"  Current as of: June 26, 2019  Content Version: 12.2  © 9041-1036 [employer]. Care instructions adapted under license by [employer] (which disclaims liability or warranty for this information). If you have questions about a medical condition or this instruction, always ask your healthcare professional. [place] [address] any warranty or liability for your use of this information. Patient Education        Learning About Opioid Use Disorder  What is opioid use disorder? Opioid use disorder means that a person uses opioids even though it causes harm to themselves or others. It can range from mild to severe.  The more signs of this disorder you have, the more severe it may be. Moderate to severe opioid use disorder is sometimes called addiction. People who have it may find it hard to control their use. This disorder can develop from the use of any type of opioid. Prescription ones include hydrocodone, oxycodone, fentanyl, and morphine. Heroin is an example of an illegal opioid. Opioids can be dangerous. Taking too much can cause:  · Trouble breathing. · Low blood pressure. · A low heart rate. · A coma. · Death. Many people with this disorder, and sometimes their families, feel embarrassed or ashamed. Don't let these feelings  the way of getting treatment. Remember that this disorder can happen to anyone who uses opioids, no matter what the reason. What are the signs? You may have opioid use disorder if two or more of the following are true. The more signs of this disorder you have, the more severe it may be. · You use larger amounts of opioids than you ever meant to. Or you've been using them for a longer period of time than you ever meant to. · You can't cut down or control your use. Or you constantly wish you could cut down. · You spend a lot of time getting or using opioids or recovering from the effects. · You have strong cravings for opioids. · You can no longer do your main jobs at work, at school, or at home. · You keep using, even though your opioid use hurts your relationships. · You have stopped doing important activities because of your opioid use. · You use opioids in situations where doing so is dangerous. · You keep using, even though you know it is causing health problems. · You need more and more of the opioids to get the same effect, or you get less effect from the same amount over time. This is called tolerance. · You have uncomfortable symptoms when you stop using opioids or use less.  This is called withdrawal.  If you're taking opioids as part of a supervised care plan, tolerance and withdrawal may not mean that you have opioid use disorder. How is opioid use disorder treated? Treatment usually includes medicines, group therapy, one or more types of counseling, and education. Medicines may be used to help you quit. They may help to control cravings, ease withdrawal symptoms, and prevent relapse. This treatment is called medication-assisted treatment, or MAT. During MAT, you take a medicine (usually methadone or buprenorphine) in place of the opioid you were using. Most people take the medicine for months or years as a part of the treatment, along with therapy or counseling. Treatment focuses on more than opioid use. It helps you cope with the anger, frustration, sadness, and disappointment that often happen when a person tries to stop using opioids. Treatment also looks at other parts of your life, like your relationships with friends and family, school and work, medical problems, and living situation. Treatment helps you find and manage problems. It helps you take control of your life so you don't have to depend on opioids. Opioid use disorder affects your whole family. Family counseling often is part of treatment. Urgent treatment for an overdose  Naloxone is a medicine that reverses the effects of an opioid overdose. If you take it or someone gives it to you soon enough after an overdose, it can save your life. Naloxone comes in a rescue kit you can carry with you. Ask your doctor or pharmacist about having a naloxone rescue kit on hand. Follow-up care is a key part of your treatment and safety. Be sure to make and go to all appointments, and call your doctor if you are having problems. It's also a good idea to know your test results and keep a list of the medicines you take. Where can you learn more? Go to http://asael-sloan.info/. Enter Q796 in the search box to learn more about \"Learning About Opioid Use Disorder. \"  Current as of: February 5, 2019  Content Version: 12.2  © 0265-7483 Healthwise, Incorporated. Care instructions adapted under license by [employer] (which disclaims liability or warranty for this information). If you have questions about a medical condition or this instruction, always ask your healthcare professional. [address] any warranty or liability for your use of this information. Patient Education        Learning About High Blood Sugar  What is high blood sugar? Your body turns the food you eat into glucose (sugar), which it uses for energy. But if your body isn't able to use the sugar right away, it can build up in your blood and lead to high blood sugar. When the amount of sugar in your blood stays too high for too much of the time, you may have diabetes. Diabetes is a disease that can cause serious health problems. The good news is that lifestyle changes may help you get your blood sugar back to normal and avoid or delay diabetes. What causes high blood sugar? Sugar (glucose) can build up in your blood if you:  · Are overweight. · Have a family history of diabetes. · Take certain medicines, such as steroids. What are the symptoms? Having high blood sugar may not cause any symptoms at all. Or it may make you feel very thirsty or very hungry. You may also urinate more often than usual, have blurry vision, or lose weight without trying. How is high blood sugar treated? You can take steps to lower your blood sugar level if you understand what makes it get higher. Your doctor may want you to learn how to test your blood sugar level at home. Then you can see how illness, stress, or different kinds of food or medicine raise or lower your blood sugar level. Other tests may be needed to see if you have diabetes. How can you prevent high blood sugar? · Watch your weight. If you're overweight, losing just a small amount of weight may help. Reducing fat around your waist is most important.   · Limit the amount of calories, sweets, and unhealthy fat you eat. Ask your doctor if a dietitian can help you. A registered dietitian can help you create meal plans that fit your lifestyle. · Get at least 30 minutes of exercise on most days of the week. Exercise helps control your blood sugar. It also helps you maintain a healthy weight. Walking is a good choice. You also may want to do other activities, such as running, swimming, cycling, or playing tennis or team sports. · If your doctor prescribed medicines, take them exactly as prescribed. Call your doctor if you think you are having a problem with your medicine. You will get more details on the specific medicines your doctor prescribes. Follow-up care is a key part of your treatment and safety. Be sure to make and go to all appointments, and call your doctor if you are having problems. It's also a good idea to know your test results and keep a list of the medicines you take. Where can you learn more? Go to http://asael-sloan.info/. Enter O108 in the search box to learn more about \"Learning About High Blood Sugar. \"  Current as of: April 16, 2019  Content Version: 12.2  © 3710-6826 [facility]. Care instructions adapted under license by [employer] (which disclaims liability or warranty for this information). If you have questions about a medical condition or this instruction, always ask your healthcare professional. [address] any warranty or liability for your use of this information.

## 2019-11-24 NOTE — ED PROVIDER NOTES
EMERGENCY DEPARTMENT HISTORY AND PHYSICAL EXAM      Date: 11/24/2019  Patient Name: Seble Whitaker    Please note that this dictation was completed with [email or web address], the computer voice recognition software. Quite often unanticipated grammatical, syntax, homophones, and other interpretive errors are inadvertently transcribed by the computer software. Please disregard these errors. Please excuse any errors that have escaped final proofreading. History of Presenting Illness     Chief Complaint   Patient presents with    Flank Pain     L flank pain that radiates into L groin x last night, reports hx of kidney stones, +n/v       History Provided By: Patient     Care management plan reviewed. No opiates will be used in the care of this patient. She is had 48 visits to local emergency departments in the last year. HPI: Seble Whitaker, 37 y.o. female, with a past medical history significant for chronic pain, opiate dependence, multiple emergency department visits, 50 emergency department visits in the last year, does have a history of kidney stones, presented the emergency department complaining of left flank pain that began last night. Patient states the pain is severe, radiates from the left flank flank down to the groin. Denies chance of pregnancy, denies any dysuria or hematuria. No fevers or chills. Reports nausea but no vomiting. Denies any chest pain or shortness of breath. She reports that she has tried Tylenol, ibuprofen, hydrocodone with no relief. States she just ran out of hydrocodone yesterday after taking her last few doses. PCP: Devang Chahal NP    No current facility-administered medications on file prior to encounter. Current Outpatient Medications on File Prior to Encounter   Medication Sig Dispense Refill    metFORMIN (GLUCOPHAGE) 1,000 mg tablet Take 1 Tab by mouth two (2) times daily (with meals).  60 Tab 0    [DISCONTINUED] butalbital-acetaminophen-caff (FIORICET) -40 mg per capsule Take 1 Cap by mouth every four (4) hours as needed for Pain. 20 Cap 0       Past History     Past Medical History:  Past Medical History:   Diagnosis Date    Abnormal Pap smear     abnormal pap at 15 y/o normal since     [facility]    [facility]    [facility])     Diabetes mellitus     Gestational Diabetes with last two pregnancies-diet controlled       Past Surgical History:  Past Surgical History:   Procedure Laterality Date    HX  SECTION      HX CHOLECYSTECTOMY      HX OTHER SURGICAL      cholecystectomy 2002       Family History:  Family History   Problem Relation Age of Onset    Diabetes Father     Hypertension Father     Stroke Father     Kidney Disease Father        Social History:  Social History     Tobacco Use    Smoking status: Never Smoker    Smokeless tobacco: Never Used   Substance Use Topics    Alcohol use: Yes     Comment: Socially    Drug use: No       Allergies: Allergies   Allergen Reactions    Amoxicillin Rash    Tramadol Hives         Review of Systems   Review of Systems   Constitutional: Negative for chills and fever. HENT: Negative for congestion and sore throat. Eyes: Negative for visual disturbance. Respiratory: Negative for cough and shortness of breath. Cardiovascular: Negative for chest pain and leg swelling. Gastrointestinal: Positive for abdominal pain and nausea. Negative for blood in stool, diarrhea and vomiting. Endocrine: Negative for polyuria. Genitourinary: Positive for flank pain. Negative for dysuria, vaginal bleeding and vaginal discharge. Musculoskeletal: Negative for myalgias. Skin: Negative for rash. Allergic/Immunologic: Negative for immunocompromised state. Neurological: Negative for weakness and headaches. Psychiatric/Behavioral: Negative for confusion. Physical Exam   Physical Exam  Vitals signs and nursing note reviewed.    Constitutional:       Appearance: She is well-developed. HENT:      Head: Normocephalic and atraumatic. Eyes:      General:         Right eye: No discharge. Left eye: No discharge. Conjunctiva/sclera: Conjunctivae normal.      Pupils: Pupils are equal, round, and reactive to light. Neck:      Musculoskeletal: Normal range of motion and neck supple. Trachea: No tracheal deviation. Cardiovascular:      Rate and Rhythm: Regular rhythm. Tachycardia present. Heart sounds: Normal heart sounds. No murmur. Pulmonary:      Effort: Pulmonary effort is normal. No respiratory distress. Breath sounds: Normal breath sounds. No wheezing or rales. Abdominal:      General: Bowel sounds are normal.      Palpations: Abdomen is soft. Tenderness: There is no tenderness. There is no guarding or rebound. Musculoskeletal: Normal range of motion. General: No tenderness or deformity. Skin:     General: Skin is warm and dry. Findings: No erythema or rash. Neurological:      Mental Status: She is alert and oriented to person, place, and time. Psychiatric:         Behavior: Behavior normal.         Diagnostic Study Results     Labs -     Recent Results (from the past 12 hour(s))   CBC WITH AUTOMATED DIFF    Collection Time: 11/24/19 10:17 AM   Result Value Ref Range    WBC 8.2 3.6 - 11.0 K/uL    RBC 5.29 (H) 3.80 - 5.20 M/uL    HGB 14.8 11.5 - 16.0 g/dL    HCT 44.7 35.0 - 47.0 %    MCV 84.5 80.0 - 99.0 FL    MCH 28.0 26.0 - 34.0 PG    MCHC 33.1 30.0 - 36.5 g/dL    RDW 11.7 11.5 - 14.5 %    PLATELET 125 848 - 763 K/uL    MPV 10.3 8.9 - 12.9 FL    NRBC 0.0 0  WBC    ABSOLUTE NRBC 0.00 0.00 - 0.01 K/uL    NEUTROPHILS 78 (H) 32 - 75 %    LYMPHOCYTES 18 12 - 49 %    MONOCYTES 4 (L) 5 - 13 %    EOSINOPHILS 0 0 - 7 %    BASOPHILS 0 0 - 1 %    IMMATURE GRANULOCYTES 0 0.0 - 0.5 %    ABS. NEUTROPHILS 6.3 1.8 - 8.0 K/UL    ABS. LYMPHOCYTES 1.5 0.8 - 3.5 K/UL    ABS. MONOCYTES 0.3 0.0 - 1.0 K/UL    ABS.  EOSINOPHILS 0.0 0.0 - 0.4 K/UL    ABS. BASOPHILS 0.0 0.0 - 0.1 K/UL    ABS. IMM. GRANS. 0.0 0.00 - 0.04 K/UL    DF AUTOMATED     METABOLIC PANEL, COMPREHENSIVE    Collection Time: 11/24/19 10:17 AM   Result Value Ref Range    Sodium 135 (L) 136 - 145 mmol/L    Potassium 3.8 3.5 - 5.1 mmol/L    Chloride 100 97 - 108 mmol/L    CO2 27 21 - 32 mmol/L    Anion gap 8 5 - 15 mmol/L    Glucose 374 (H) 65 - 100 mg/dL    BUN 4 (L) 6 - 20 MG/DL    Creatinine 0.81 0.55 - 1.02 MG/DL    BUN/Creatinine ratio 5 (L) 12 - 20      GFR est AA >60 >60 ml/min/1.73m2    GFR est non-AA >60 >60 ml/min/1.73m2    Calcium 9.1 8.5 - 10.1 MG/DL    Bilirubin, total 0.6 0.2 - 1.0 MG/DL    ALT (SGPT) 16 12 - 78 U/L    AST (SGOT) 12 (L) 15 - 37 U/L    Alk. phosphatase 93 45 - 117 U/L    Protein, total 7.5 6.4 - 8.2 g/dL    Albumin 3.6 3.5 - 5.0 g/dL    Globulin 3.9 2.0 - 4.0 g/dL    A-G Ratio 0.9 (L) 1.1 - 2.2     URINALYSIS W/ RFLX MICROSCOPIC    Collection Time: 11/24/19 10:17 AM   Result Value Ref Range    Color YELLOW/STRAW      Appearance CLEAR CLEAR      Specific gravity 1.015 1.003 - 1.030      pH (UA) 5.0 5.0 - 8.0      Protein NEGATIVE  NEG mg/dL    Glucose >1,000 (A) NEG mg/dL    Ketone 40 (A) NEG mg/dL    Bilirubin NEGATIVE  NEG      Blood NEGATIVE  NEG      Urobilinogen 0.2 0.2 - 1.0 EU/dL    Nitrites NEGATIVE  NEG      Leukocyte Esterase NEGATIVE  NEG     HCG URINE, QL    Collection Time: 11/24/19 10:17 AM   Result Value Ref Range    HCG urine, QL NEGATIVE  NEG     DRUG SCREEN, URINE    Collection Time: 11/24/19 10:17 AM   Result Value Ref Range    AMPHETAMINES NEGATIVE  NEG      BARBITURATES NEGATIVE  NEG      BENZODIAZEPINES NEGATIVE  NEG      COCAINE NEGATIVE  NEG      METHADONE NEGATIVE  NEG      OPIATES POSITIVE (A) NEG      PCP(PHENCYCLIDINE) NEGATIVE  NEG      THC (TH-CANNABINOL) NEGATIVE  NEG      Drug screen comment (NOTE)        Radiologic Studies -   US RETROPERITONEUM COMP   Final Result   IMPRESSION: Kidneys are within normal limits.  There is no hydronephrosis. XR ABD (KUB)   Final Result   IMPRESSION: No identified urinary tract stones or other acute abnormalities. CT Results  (Last 48 hours)    None        CXR Results  (Last 48 hours)    None            Medical Decision Making   I am the first provider for this patient. I reviewed the vital signs, available nursing notes, past medical history, past surgical history, family history and social history. Vital Signs-Reviewed the patient's vital signs. Patient Vitals for the past 12 hrs:   Temp Pulse Resp BP SpO2   11/24/19 1200  96 18 109/63 100 %   11/24/19 1100    123/77 100 %   11/24/19 1040    121/76 100 %   11/24/19 0940 98.4 °F (36.9 °C) (!) 117 16 137/85 100 %       Records Reviewed: Nursing Notes and Old Medical Records    Provider Notes (Medical Decision Making):   Patient has had multiple emergency department visits with pain seeking complaints. She has a pain management contract, she also has a care management plan in place. Her urine is unremarkable for blood or sign of infection, does have some ketones, but no gap acidosis, no DKA. There is some glucosuria, this is likely due to her hyperglycemia. X-ray showed no evidence of calculus, ultrasound shows no evidence of hydronephrosis. CT imaging not obtained due to the fact that the patient has had multiple radiologic studies and concern for harm due to continued radiation exposure was expressed to the patient. Labs are reassuring, no imaging needed at this time, patient advised to take oral over-the-counter pain medicines at home, follow-up with primary care doctor. I discussed the patient's care management plan with her. ED Course:   Initial assessment performed. The patients presenting problems have been discussed, and they are in agreement with the care plan formulated and outlined with them. I have encouraged them to ask questions as they arise throughout their visit.            Critical Care Time: none    Disposition:  DISCHARGE NOTE  Patients results have been reviewed with them. Patient and/or family have verbally conveyed their understanding and agreement of the patient's signs, symptoms, diagnosis, treatment and prognosis and additionally agree to follow up as recommended or return to the Emergency Room should their condition change or have any new concerns prior to their follow-up appointment. Patient verbally agrees with the care-plan and verbally conveys that all of their questions have been answered. Discharge instructions have also been provided to the patient with some educational information regarding their diagnosis as well a list of reasons why they would want to return to the ER prior to their follow-up appointment should their condition change. PLAN:  1. Discharge Medication List as of 11/24/2019 12:24 PM        2. Follow-up Information     Follow up With Specialties Details Why Contact Info    Jefferson Lassiter NP Nurse Practitioner Schedule an appointment as soon as possible for a visit  [facility]       [address] [facility] EMERGENCY DEPT Emergency Medicine  If symptoms worsen [address] [place] [address] Drive  [address] [facility]  [phone]          Return to ED if worse     Diagnosis     Clinical Impression:   1. Flank pain    2. Hyperglycemia    3. Opioid abuse ([facility] [address]    4. Opioid dependence with opioid-induced disorder ([facility] [address]        Attestations:   This note was completed by Jennifer Arora DO

## 2020-03-18 ENCOUNTER — OFFICE VISIT (OUTPATIENT)
Dept: URGENT CARE | Age: 35
End: 2020-03-18

## 2020-03-18 VITALS
DIASTOLIC BLOOD PRESSURE: 92 MMHG | TEMPERATURE: 98.5 F | SYSTOLIC BLOOD PRESSURE: 155 MMHG | OXYGEN SATURATION: 99 % | BODY MASS INDEX: 32.65 KG/M2 | WEIGHT: 196 LBS | RESPIRATION RATE: 18 BRPM | HEART RATE: 90 BPM | HEIGHT: 65 IN

## 2020-03-18 DIAGNOSIS — L03.019 INFECTION OF FINGERNAIL: Primary | ICD-10-CM

## 2020-03-18 RX ORDER — CEPHALEXIN 500 MG/1
500 CAPSULE ORAL 3 TIMES DAILY
Qty: 30 CAP | Refills: 0 | Status: SHIPPED | OUTPATIENT
Start: 2020-03-18 | End: 2020-03-28

## 2020-03-18 NOTE — PROGRESS NOTES
Here for finger nail pain  Was seen in ED last night had nail hypercation /drained  Started on clindamycin  promtoes finger is improving but \"still sore when I try to type\"  Also reporting non bloody diarrhea from clindamycin. Denies vomiting, fever, chills, swelling.   Hx of type II diabetes           Past Medical History:   Diagnosis Date    Abnormal Pap smear     abnormal pap at 15 y/o normal since     [facility]    [facility]    Diabetes [facility])     Diabetes mellitus     Gestational Diabetes with last two pregnancies-diet controlled        Past Surgical History:   Procedure Laterality Date    HX  SECTION      HX CHOLECYSTECTOMY      HX OTHER SURGICAL      cholecystectomy          Family History   Problem Relation Age of Onset    Diabetes Father     Hypertension Father     Stroke Father     Kidney Disease Father         Social History     Socioeconomic History    Marital status: SINGLE     Spouse name: Not on file    Number of children: Not on file    Years of education: Not on file    Highest education level: Not on file   Occupational History    Not on file   Social Needs    Financial resource strain: Not on file    Food insecurity     Worry: Not on file     Inability: Not on file    Transportation needs     Medical: Not on file     Non-medical: Not on file   Tobacco Use    Smoking status: Never Smoker    Smokeless tobacco: Never Used   Substance and Sexual Activity    Alcohol use: Yes     Comment: Socially    Drug use: No    Sexual activity: Yes     Birth control/protection: None   Lifestyle    Physical activity     Days per week: Not on file     Minutes per session: Not on file    Stress: Not on file   Relationships    Social connections     Talks on phone: Not on file     Gets together: Not on file     Attends Hindu service: Not on file     Active member of club or organization: Not on file     Attends meetings of clubs or organizations: Not on file     Relationship status: Not on file    Intimate partner violence     Fear of current or ex partner: Not on file     Emotionally abused: Not on file     Physically abused: Not on file     Forced sexual activity: Not on file   Other Topics Concern    Not on file   Social History Narrative    Not on file                ALLERGIES: Amoxicillin and Tramadol    Review of Systems   All other systems reviewed and are negative. Vitals:    03/18/20 1757 03/18/20 1809   BP: (!) 155/92    Pulse: (!) 110 90   Resp: 18    Temp: 98.5 °F (36.9 °C)    SpO2: 99%    Weight: 196 lb (88.9 kg)    Height: 5' 5\" (1.651 m)        Physical Exam  Vitals signs reviewed. HENT:      Nose: Nose normal.   Eyes:      Extraocular Movements: Extraocular movements intact. Conjunctiva/sclera: Conjunctivae normal.      Pupils: Pupils are equal, round, and reactive to light. Neck:      Musculoskeletal: Normal range of motion and neck supple. Cardiovascular:      Rate and Rhythm: Normal rate and regular rhythm. Pulses: Normal pulses. Heart sounds: Normal heart sounds. Pulmonary:      Effort: Pulmonary effort is normal.   Skin:     Capillary Refill: Capillary refill takes less than 2 seconds. Comments: Right finger nail 3rd finger with surgical 1mm hole in nail. No pus. No erythema. TTP. Full AROM flexion and extension. No streaking. Neurological:      Mental Status: She is alert. Psychiatric:         Mood and Affect: Mood normal.         Behavior: Behavior normal.         [facility]     Differential Diagnosis; Clinical Impression; Plan:       CLINICAL IMPRESSION:  (L03.019) Infection of fingernail  (primary encounter diagnosis)    Orders Placed This Encounter      cephALEXin (Keflex) 500 mg capsule          Sig: Take 1 Cap by mouth three (3) times daily for 10 days.           Dispense:  30 Cap          Refill:  0      Plan:  May try probiotic and continue clindamycin given diarrhea, if doesn't help may switch to cephalexin  Otherwise finger is clinically improving. No concerning findings today suggesting worsening infection. Contact PCP by phone if getting worse.               Procedures

## 2020-03-18 NOTE — LETTER
NOTIFICATION RETURN TO WORK / SCHOOL 
 
3/18/2020 6:07 PM 
 
Ms. Janelle Pino [address] [facility] [address] To Whom It May Concern: 
 
Janelle Pino is currently under the care of [address]. She will return to work/school on: 03/20/2020 If there are questions or concerns please have the patient contact our office. Sincerely, G[facility] PROVIDER

## 2020-04-14 ENCOUNTER — HOSPITAL ENCOUNTER (EMERGENCY)
Age: 35
Discharge: HOME | End: 2020-04-14
Attending: EMERGENCY MEDICINE
Payer: MEDICAID

## 2020-04-14 VITALS
DIASTOLIC BLOOD PRESSURE: 93 MMHG | OXYGEN SATURATION: 100 % | SYSTOLIC BLOOD PRESSURE: 151 MMHG | BODY MASS INDEX: 33.83 KG/M2 | WEIGHT: 203.04 LBS | TEMPERATURE: 98.9 F | RESPIRATION RATE: 16 BRPM | HEIGHT: 65 IN | HEART RATE: 96 BPM

## 2020-04-14 DIAGNOSIS — K04.7 DENTAL ABSCESS: Primary | ICD-10-CM

## 2020-04-14 DIAGNOSIS — K02.9 DENTAL CARIES: ICD-10-CM

## 2020-04-14 DIAGNOSIS — K08.89 PAIN, DENTAL: ICD-10-CM

## 2020-04-14 PROCEDURE — 74011000250 HC RX REV CODE- 250: Performed by: PHYSICIAN ASSISTANT

## 2020-04-14 PROCEDURE — 74011250637 HC RX REV CODE- 250/637: Performed by: PHYSICIAN ASSISTANT

## 2020-04-14 PROCEDURE — 99282 EMERGENCY DEPT VISIT SF MDM: CPT

## 2020-04-14 RX ORDER — CLINDAMYCIN HYDROCHLORIDE 300 MG/1
300 CAPSULE ORAL 4 TIMES DAILY
Qty: 28 CAP | Refills: 0 | Status: SHIPPED | OUTPATIENT
Start: 2020-04-14 | End: 2020-04-21

## 2020-04-14 RX ORDER — IBUPROFEN 800 MG/1
800 TABLET ORAL
Qty: 30 TAB | Refills: 0 | Status: SHIPPED | OUTPATIENT
Start: 2020-04-14 | End: 2020-04-24

## 2020-04-14 RX ADMIN — LIDOCAINE HYDROCHLORIDE: 20 SOLUTION ORAL; TOPICAL at 11:39

## 2020-04-14 NOTE — DISCHARGE INSTRUCTIONS
Patient Education        Abscessed Tooth: Care Instructions  Your Care Instructions    An abscessed tooth is a tooth that has a pocket of pus in the tissues around it. Pus forms when the body tries to fight an infection caused by bacteria. If the pus cannot drain, it forms an abscess. An abscessed tooth can cause red, swollen gums and throbbing pain, especially when you chew. You may have a bad taste in your mouth and a fever, and your jaw may swell. Damage to the tooth, untreated tooth decay, or gum disease can cause an abscessed tooth. An abscessed tooth needs to be treated by a dental professional right away. If it is not treated, the infection could spread to other parts of your body. Your dentist will give you antibiotics to stop the infection. He or she may make a hole in the tooth or cut open (anshul) the abscess inside your mouth so that the infection can drain, which should relieve your pain. You may need to have a root canal treatment, which tries to save your tooth by taking out the infected pulp and replacing it with a healing medicine and/or a filling. If these treatments do not work, your tooth may have to be removed. Follow-up care is a key part of your treatment and safety. Be sure to make and go to all appointments, and call your doctor if you are having problems. It's also a good idea to know your test results and keep a list of the medicines you take. How can you care for yourself at home? · Reduce pain and swelling in your face and jaw by putting ice or a cold pack on the outside of your cheek. Do this for 10 to 20 minutes at a time. Put a thin cloth between the ice and your skin. · Take pain medicines exactly as directed. ? If the doctor gave you a prescription medicine for pain, take it as prescribed. ? If you are not taking a prescription pain medicine, ask your doctor if you can take an over-the-counter medicine. · Take antibiotics as directed.  Do not stop taking them just because you feel better. You need to take the full course of antibiotics. To prevent tooth abscess  · Brush and floss every day. Have regular dental checkups. · Eat a healthy diet. Avoid sugary foods and drinks. · Do not smoke or vape with nicotine. And don't use spit tobacco. Tobacco and nicotine slow your ability to heal. They increase your risk for gum disease and cancer of the mouth and throat. If you need help quitting, talk to your doctor about stop-smoking programs and medicines. These can increase your chances of quitting for good. When should you call for help? Call 911 anytime you think you may need emergency care. For example, call if:    · You have trouble breathing.    Call your doctor now or seek immediate medical care if:    · You have new or worse symptoms of infection, such as:  ? Increased pain, swelling, warmth, or redness. ? Red streaks leading from the area. ? Pus draining from the area. ? A fever.    Watch closely for changes in your health, and be sure to contact your doctor if:    · You do not get better as expected. Where can you learn more? Go to http://asael-sloan.info/  Enter L466 in the search box to learn more about \"Abscessed Tooth: Care Instructions. \"  Current as of: July 28, 2019Content Version: 12.4  © 5676-2027 Healthwise, Incorporated. Care instructions adapted under license by [employer] (which disclaims liability or warranty for this information). If you have questions about a medical condition or this instruction, always ask your healthcare professional. [place] [address] any warranty or liability for your use of this information. Patient Education        Tooth Decay: Care Instructions  Your Care Instructions    Tooth decay is damage to a tooth caused by plaque. Plaque is a thin film of bacteria that sticks to the teeth above and below the gum line.  If plaque isn't removed from the teeth, it can build up and harden into tartar. The bacteria in plaque and tartar use sugars in food to make acids. These acids can cause tooth decay and gum disease. Any part of your tooth can decay, from the roots below the gum line to the chewing surface. Decay can affect the outer layer (enamel) or inner layer (dentin) of your teeth. The deeper the decay, the worse the damage. Untreated tooth decay will get worse and may lead to tooth loss. If you have a small hole (cavity) in your tooth, your dentist can repair it by removing the decay and filling the hole. If you have deeper decay, you may need more treatment. A very badly damaged tooth may have to be removed. Follow-up care is a key part of your treatment and safety. Be sure to make and go to all appointments, and call your dentist if you are having problems. It's also a good idea to know your test results and keep a list of the medicines you take. How can you care for yourself at home? If you have pain:  · Take an over-the-counter pain medicine, such as acetaminophen (Tylenol), ibuprofen (Advil, Motrin), or naproxen (Aleve). Be safe with medicines. Read and follow all instructions on the label. ? Do not take two or more pain medicines at the same time unless the doctor told you to. Many pain medicines have acetaminophen, which is Tylenol. Too much acetaminophen (Tylenol) can be harmful. · Put ice or a cold pack on your cheek over the tooth for 10 to 15 minutes at a time. Put a thin cloth between the ice and your skin. To prevent tooth decay  · Brush teeth twice a day, and floss once a day. Brushing with fluoride toothpaste and flossing may be enough to reverse early decay. · Use a toothbrush with soft, rounded-end bristles and a head that is small enough to reach all parts of your teeth and mouth. Replace your toothbrush every 3 or 4 months. You may also use an electric toothbrush that has rotating and oscillating (back-and-forth) action.   · Ask your dentist about having fluoride treatments at the dental office. · Brush your tongue to help get rid of bacteria. · Eat healthy foods that include whole grains, vegetables, and fruits. · Have your teeth cleaned by a professional at least two times a year. · Do not smoke or use smokeless tobacco. Tobacco can make tooth decay worse. When should you call for help? Call 911 anytime you think you may need emergency care. For example, call if:    · You have trouble breathing.    Call your dentist now or seek immediate medical care if:    · You have new or worse symptoms of infection, such as:  ? Increased pain, swelling, warmth, or redness. ? Red streaks leading from the area. ? Pus draining from the area. ? A fever.    Watch closely for changes in your health, and be sure to contact your doctor if:    · You do not get better as expected. Where can you learn more? Go to http://asael-sloan.info/  Enter F057 in the search box to learn more about \"Tooth Decay: Care Instructions. \"  Current as of: July 28, 2019Content Version: 12.4  © 9039-1851 Healthwise, Incorporated. Care instructions adapted under license by [employer] (which disclaims liability or warranty for this information). If you have questions about a medical condition or this instruction, always ask your healthcare professional. [address] any warranty or liability for your use of this information.

## 2020-04-14 NOTE — ED PROVIDER NOTES
EMERGENCY DEPARTMENT HISTORY AND PHYSICAL EXAM      Date: 2020  Patient Name: Federico Boogie    History of Presenting Illness     Chief Complaint   Patient presents with    Dental Pain     Left front toot started hurting on  night. Tried to call a dentist but they are closed. History Provided By: Patient    HPI: Federico Boogie, 37 y.o. female without significant PMHx, presents by POV to the ED with cc of left upper dental pain started this past . The pain is a constant pain that radiates to her left ear. She denies fever, chills, sore throat, URI complaints, cough. She is taken over-the-counter medications and applied Anbesol without relief. She notes that she called her dental office to schedule an appointment but they are currently closed to the [facility]. She denies a history of prior dental abscesses but notes a history of multiple caries. There are no other complaints, changes, or physical findings at this time. Social Hx: Tobacco (denies), EtOH (denies), Illicit drug use (denies)     PCP: Joshua Hughes NP    No current facility-administered medications on file prior to encounter. Current Outpatient Medications on File Prior to Encounter   Medication Sig Dispense Refill    metFORMIN (GLUCOPHAGE) 1,000 mg tablet Take 1 Tab by mouth two (2) times daily (with meals).  61 Tab 0       Past History     Past Medical History:  Past Medical History:   Diagnosis Date    Abnormal Pap smear     abnormal pap at 13 y/o normal since     Back pain 2015    [facility]  emergency [facility])     Diabetes mellitus     Gestational Diabetes with last two pregnancies-diet controlled       Past Surgical History:  Past Surgical History:   Procedure Laterality Date    HX  SECTION      HX CHOLECYSTECTOMY      HX OTHER SURGICAL      cholecystectomy        Family History:  Family History   Problem Relation Age of Onset    Diabetes Father     Hypertension Father  Stroke Father     Kidney Disease Father        Social History:  Social History     Tobacco Use    Smoking status: Never Smoker    Smokeless tobacco: Never Used   Substance Use Topics    Alcohol use: Yes     Comment: Socially    Drug use: No       Allergies: Allergies   Allergen Reactions    Amoxicillin Rash    Tramadol Hives         Review of Systems   Review of Systems   Constitutional: Negative for chills, diaphoresis and fever. HENT: Positive for dental problem. Negative for congestion, ear pain, rhinorrhea and sore throat. Respiratory: Negative for cough and shortness of breath. Cardiovascular: Negative for chest pain. Gastrointestinal: Negative for abdominal pain, constipation, diarrhea, nausea and vomiting. Genitourinary: Negative for difficulty urinating, dysuria, frequency and hematuria. Musculoskeletal: Negative for arthralgias and myalgias. Neurological: Negative for headaches. All other systems reviewed and are negative. Physical Exam   Physical Exam  Vitals signs and nursing note reviewed. Constitutional:       General: She is not in acute distress. Appearance: She is well-developed. She is not diaphoretic. Comments: 37 y.o. -American female    HENT:      Head: Normocephalic and atraumatic. Right Ear: Tympanic membrane and ear canal normal. No middle ear effusion. Tympanic membrane is not injected. Left Ear: Tympanic membrane and ear canal normal.  No middle ear effusion. Tympanic membrane is not injected. Nose: Nose normal. No congestion or rhinorrhea. Right Sinus: No maxillary sinus tenderness or frontal sinus tenderness. Left Sinus: No maxillary sinus tenderness or frontal sinus tenderness. Comments: No facial swelling. Mouth/Throat:      Mouth: Mucous membranes are moist.      Dentition: Abnormal dentition. Dental tenderness, dental caries and dental abscesses present.       Comments: Tender to palpation of the left upper canine tooth with swelling and erythema to the surrounding gums. There is no fluctuance. Eyes:      General:         Right eye: No discharge. Left eye: No discharge. Conjunctiva/sclera: Conjunctivae normal.   Neck:      Musculoskeletal: Normal range of motion and neck supple. Cardiovascular:      Rate and Rhythm: Normal rate and regular rhythm. Heart sounds: Normal heart sounds. No murmur. Pulmonary:      Effort: Pulmonary effort is normal. No respiratory distress. Breath sounds: Normal breath sounds. Skin:     General: Skin is warm and dry. Neurological:      Mental Status: She is alert and oriented to person, place, and time. Psychiatric:         Behavior: Behavior normal.         Diagnostic Study Results     Labs - none    Radiologic Studies - none    Medical Decision Making   I am the first provider for this patient. I reviewed the vital signs, available nursing notes, past medical history, past surgical history, family history and social history. Vital Signs-Reviewed the patient's vital signs. Patient Vitals for the past 12 hrs:   Temp Pulse Resp BP SpO2   04/14/20 1121 98.9 °F (37.2 °C) (!) 113 18 (!) 151/93 100 %       Records Reviewed: Nursing Notes and Old Medical Records    Provider Notes (Medical Decision Making):   Patient has an insolated complaint of dental pain that has been unrelieved with over the counter medications. She is afebrile. Differential diagnosis include dental caries, dental pain,dental abscess, gingivitis. Will treat with antibiotics until patient is able to follow-up for definitive care with her dentist.    ED Course:   Initial assessment performed. The patients presenting problems have been discussed, and they are in agreement with the care plan formulated and outlined with them. I have encouraged them to ask questions as they arise throughout their visit.          Critical Care Time: None    Disposition:  DISCHARGE NOTE:  11:41 AM  The pt is ready for discharge. The pt's signs, symptoms, diagnosis, and discharge instructions have been discussed and pt has conveyed their understanding. The pt is to follow up as recommended or return to ER should their symptoms worsen. Plan has been discussed and pt is in agreement. PLAN:  1. Current Discharge Medication List      START taking these medications    Details   clindamycin (CLEOCIN) 300 mg capsule Take 1 Cap by mouth four (4) times daily for 7 days. Qty: 28 Cap, Refills: 0      ibuprofen (MOTRIN) 800 mg tablet Take 1 Tab by mouth every eight (8) hours as needed for Pain for up to 10 days. Qty: 30 Tab, Refills: 0           2. Follow-up Information     Follow up With Specialties Details Why Contact Info    Your Dentist  In 3 days          Return to ED if worse     Diagnosis     Clinical Impression:   1. Dental abscess    2. Dental caries    3. Pain, dental          Please note that this dictation was completed with [employer], the computer voice recognition software. Quite often unanticipated grammatical, syntax, homophones, and other interpretive errors are inadvertently transcribed by the computer software. Please disregards these errors. Please excuse any errors that have escaped final proofreading. This note will not be viewable in [address]

## 2020-04-15 ENCOUNTER — PATIENT OUTREACH (OUTPATIENT)
Dept: CARDIOLOGY CLINIC | Age: 35
End: 2020-04-15

## 2020-04-17 ENCOUNTER — PATIENT OUTREACH (OUTPATIENT)
Dept: CARDIOLOGY CLINIC | Age: 35
End: 2020-04-17

## 2020-04-17 NOTE — PROGRESS NOTES
.Patient contacted regarding recent discharge and COVID-19 risk   Care Transition Nurse/ Ambulatory Care Manager contacted the patient by telephone to perform post discharge assessment. Verified name and  with patient as identifiers. Patient has following risk factors of: diabetes. CTN/ACM reviewed discharge instructions, medical action plan and red flags related to discharge diagnosis. Reviewed and educated them on any new and changed medications related to discharge diagnosis. Advised obtaining a 90-day supply of all daily and as-needed medications. Education provided regarding infection prevention, and signs and symptoms of COVID-19 and when to seek medical attention with patient who verbalized understanding. Discussed exposure protocols and quarantine from [address] you at higher risk for severe illness  and given an opportunity for questions and concerns. The patient agrees to contact the COVID-19 hotline [phone] or PCP office for questions related to their healthcare. CTN/ACM provided contact information for future reference. From CDC: Are you at higher risk for severe illness?  Wash your hands often.  Avoid close contact (6 feet, which is about two arm lengths) with people who are sick.  Put distance between yourself and other people if COVID-19 is spreading in your community.  Clean and disinfect frequently touched surfaces.  Avoid all cruise travel and non-essential air travel.  Call your healthcare professional if you have concerns about COVID-19 and your underlying condition or if you are sick. For more information on steps you can take to protect yourself, see CDC's How to Protect Yourself      Patient/family/caregiver given information for [facility] Raul Casas and agrees to enroll no    Plan for follow-up call in 7-14 days based on severity of symptoms and risk factors.

## 2020-05-02 ENCOUNTER — PATIENT OUTREACH (OUTPATIENT)
Dept: CARDIOLOGY CLINIC | Age: 35
End: 2020-05-02

## 2020-05-02 NOTE — PROGRESS NOTES
Patient resolved from Transition of Care episode on 5/2/20  Patient/family has been provided the following resources and education related to COVID-19:                         Signs, symptoms and red flags related to COVID-19            CDC exposure and quarantine guidelines            [facility]it exposure contact - [phone]            Contact for their local Department of Health                 Patient currently reports that the following symptoms have improved:  no new symptoms. No further outreach scheduled with this CTN/ACM. Episode of Care resolved. Patient has this CTN/ACM contact information if future needs arise.

## 2020-06-14 ENCOUNTER — HOSPITAL ENCOUNTER (EMERGENCY)
Age: 35
Discharge: HOME | End: 2020-06-14
Attending: EMERGENCY MEDICINE | Admitting: EMERGENCY MEDICINE
Payer: MEDICAID

## 2020-06-14 VITALS
SYSTOLIC BLOOD PRESSURE: 144 MMHG | TEMPERATURE: 98.6 F | WEIGHT: 201.72 LBS | HEART RATE: 84 BPM | BODY MASS INDEX: 32.42 KG/M2 | OXYGEN SATURATION: 96 % | HEIGHT: 66 IN | DIASTOLIC BLOOD PRESSURE: 89 MMHG | RESPIRATION RATE: 14 BRPM

## 2020-06-14 DIAGNOSIS — Z98.890 S/P HEMORRHOIDECTOMY: ICD-10-CM

## 2020-06-14 DIAGNOSIS — K62.89 RECTAL PAIN: Primary | ICD-10-CM

## 2020-06-14 DIAGNOSIS — Z87.19 S/P HEMORRHOIDECTOMY: ICD-10-CM

## 2020-06-14 PROCEDURE — 74011250636 HC RX REV CODE- 250/636: Performed by: EMERGENCY MEDICINE

## 2020-06-14 PROCEDURE — 96372 THER/PROPH/DIAG INJ SC/IM: CPT

## 2020-06-14 PROCEDURE — 99283 EMERGENCY DEPT VISIT LOW MDM: CPT

## 2020-06-14 PROCEDURE — 74011000250 HC RX REV CODE- 250: Performed by: EMERGENCY MEDICINE

## 2020-06-14 RX ORDER — IBUPROFEN 800 MG/1
800 TABLET ORAL
Qty: 30 TAB | Refills: 0 | Status: SHIPPED | OUTPATIENT
Start: 2020-06-14 | End: 2020-12-12

## 2020-06-14 RX ORDER — HYDROCODONE BITARTRATE AND ACETAMINOPHEN 5; 325 MG/1; MG/1
1 TABLET ORAL
Qty: 12 TAB | Refills: 0 | Status: SHIPPED | OUTPATIENT
Start: 2020-06-14 | End: 2020-06-17

## 2020-06-14 RX ORDER — LIDOCAINE HYDROCHLORIDE 20 MG/ML
JELLY TOPICAL
Status: COMPLETED | OUTPATIENT
Start: 2020-06-14 | End: 2020-06-14

## 2020-06-14 RX ORDER — KETOROLAC TROMETHAMINE 30 MG/ML
30 INJECTION, SOLUTION INTRAMUSCULAR; INTRAVENOUS ONCE
Status: COMPLETED | OUTPATIENT
Start: 2020-06-14 | End: 2020-06-14

## 2020-06-14 RX ADMIN — LIDOCAINE HYDROCHLORIDE: 20 JELLY TOPICAL at 16:33

## 2020-06-14 RX ADMIN — KETOROLAC TROMETHAMINE 30 MG: 30 INJECTION, SOLUTION INTRAMUSCULAR at 16:33

## 2020-06-14 NOTE — ED PROVIDER NOTES
EMERGENCY DEPARTMENT HISTORY AND PHYSICAL EXAM      Date: 2020  Patient Name: Bee Duckworth    History of Presenting Illness     Chief Complaint   Patient presents with    Medication Refill     Pt had hemroidectomy, states she is out of pain medication, pt has appt tuesday with surgeon. History Provided By: Patient    HPI: Bee Duckworth, 37 y.o. female  presents to the ED with cc of rectal pain. Patient status post hemorrhoidectomy about 2 to 3 weeks ago. She is having acute pain in the rectum. She has run out of her medicines including a NSAID and opioid that were prescribed by the surgeon. She is seen the surgeon in 3 days. She states her bleeding is improving. She has not had any fevers or chills. It hurts to set on her butt. No nausea or vomiting. Past History     Past Medical History:  Past Medical History:   Diagnosis Date    Abnormal Pap smear     abnormal pap at 15 y/o normal since     Back pain 2015    [facility]  emergency [facility]    Diabetes [facility])     Diabetes mellitus     Gestational Diabetes with last two pregnancies-diet controlled       Past Surgical History:  Past Surgical History:   Procedure Laterality Date    HX  SECTION      HX CHOLECYSTECTOMY      HX OTHER SURGICAL      cholecystectomy        Medications:  No current facility-administered medications on file prior to encounter. Current Outpatient Medications on File Prior to Encounter   Medication Sig Dispense Refill    metFORMIN (GLUCOPHAGE) 1,000 mg tablet Take 1 Tab by mouth two (2) times daily (with meals). 61 Tab 0       Family History:  Family History   Problem Relation Age of Onset    Diabetes Father     Hypertension Father     Stroke Father     Kidney Disease Father        Social History:  Social History     Tobacco Use    Smoking status: Never Smoker    Smokeless tobacco: Never Used   Substance Use Topics    Alcohol use: Yes     Comment: Socially    Drug use:  No Allergies: Allergies   Allergen Reactions    Amoxicillin Rash    Tramadol Hives       All the above components of the past  history are auto-populated from the electronic record. They have been reviewed and the patient has been interviewed for any pertinent past history that pertains to the patient's chief complaint and reason for visit. Not all pre-populated components may be accurate at the time this note was generated. Review of Systems   Review of Systems   Constitutional: Negative for fever. Respiratory: Negative for shortness of breath. Cardiovascular: Negative for chest pain. Gastrointestinal: Positive for rectal pain. Negative for constipation, diarrhea, nausea and vomiting. Genitourinary: Negative for difficulty urinating. Neurological: Negative for dizziness and light-headedness. Physical Exam   Physical Exam  Vitals signs and nursing note reviewed. Constitutional:       General: She is not in acute distress. Appearance: She is well-developed. She is not ill-appearing. Cardiovascular:      Rate and Rhythm: Normal rate and regular rhythm. Pulmonary:      Effort: Pulmonary effort is normal. No accessory muscle usage or respiratory distress. Breath sounds: Normal breath sounds. Abdominal:      Palpations: Abdomen is soft. Tenderness: There is no abdominal tenderness. Genitourinary:     Comments: Patient deferred exam of the anus and rectum considering she is going to see her surgeon in 3 days. Skin:     General: Skin is warm and dry. Neurological:      Mental Status: She is alert and oriented to person, place, and time. Diagnostic Study Results         Medical Decision Making     I reviewed the vital signs, available nursing notes, past medical history, past surgical history, family history and social history. Vital Signs-Reviewed the patient's vital signs.   Patient Vitals for the past 24 hrs:   Temp Pulse Resp BP SpO2   06/14/20 1521 98.6 °F (37 °C) 84 14 144/89 96 %         Records Reviewed: Nursing Notes and Old Medical Records    Provider Notes (Medical Decision Making):   Patient presents with acute rectal pain after hemorrhoidectomy a couple weeks ago. Based on her history I do not suspect that she has any complications such as infection and her bleeding is improving. She does have follow-up in the next couple days with her surgeon. She does have a care management plan in place which I have reviewed. She does have dependence on opioids however she did recently have surgery which would give her reason to have more acute pain versus her chronic pain that she normally presents with. I will prescribe her 3 days of an opioid to get her through to follow-up with her colorectal surgeon. ED Course:   Initial assessment performed. The patients presenting problems have been discussed, and they are in agreement with the care plan formulated and outlined with them. I have encouraged them to ask questions as they arise throughout their visit. Orders Placed This Encounter    ketorolac (TORADOL) injection 30 mg    lidocaine (URO-JET/ GLYDO) 2 % jelly    ibuprofen (MOTRIN) 800 mg tablet    HYDROcodone-acetaminophen (Lorcet, HYDROcodone,) 5-325 mg per tablet       Procedures      Critical Care Time:   0    Disposition:  Discharge  4:20 PM    The patient's emergency department evaluation is now complete. I have reviewed all labs, imaging, and pertinent information. I have discussed all results with the patient and/or family. Based on our evaluation today I do believe that the patient is safe to be discharged home. The patient has been provided with at home instructions that are pertinent to their complaint today, although these may not be specific to the exact diagnosis. I have reviewed the patient's home medications and attempted to reconcile if not already done so by pharmacy or nursing staff.   I have discussed all new prescriptions with the patient. The patient has been encouraged to follow-up with primary care doctor and/or specialist, and these have been discussed with the patient. The patient has been advised that they may return to the emergency department if they have any worsening symptoms and or new symptoms that are of concern to them. Verbal discharge instructions may have also been provided to the patient that may not be specifically contained in the written discharge instructions. The patient has been given opportunity to ask questions prior to discharge. PLAN:  1. Current Discharge Medication List      START taking these medications    Details   ibuprofen (MOTRIN) 800 mg tablet Take 1 Tab by mouth every eight (8) hours as needed for Pain. Qty: 30 Tab, Refills: 0      HYDROcodone-acetaminophen (Lorcet, HYDROcodone,) 5-325 mg per tablet Take 1 Tab by mouth every six (6) hours as needed for Pain for up to 3 days. Max Daily Amount: 4 Tabs. Qty: 12 Tab, Refills: 0    Associated Diagnoses: Rectal pain; S/P hemorrhoidectomy           2. Follow-up Information    None       Return to ED if worse     Diagnosis     Clinical Impression:   1. Rectal pain    2. S/P hemorrhoidectomy            This note will not be viewable in [address]

## 2020-06-14 NOTE — ED NOTES
Patient discharged by Dr. Serena Rebolledo. Patient provided with discharge instructions Rx and instructions on follow up care. Patient out of ED ambulatory accompanied by family.

## 2020-06-20 ENCOUNTER — HOSPITAL ENCOUNTER (EMERGENCY)
Age: 35
Discharge: HOME | End: 2020-06-20
Attending: EMERGENCY MEDICINE
Payer: MEDICAID

## 2020-06-20 VITALS
BODY MASS INDEX: 32.74 KG/M2 | SYSTOLIC BLOOD PRESSURE: 154 MMHG | OXYGEN SATURATION: 100 % | RESPIRATION RATE: 18 BRPM | HEIGHT: 66 IN | DIASTOLIC BLOOD PRESSURE: 90 MMHG | HEART RATE: 100 BPM | WEIGHT: 203.71 LBS | TEMPERATURE: 98.9 F

## 2020-06-20 DIAGNOSIS — K62.89 RECTAL PAIN: Primary | ICD-10-CM

## 2020-06-20 PROCEDURE — 96372 THER/PROPH/DIAG INJ SC/IM: CPT

## 2020-06-20 PROCEDURE — 99283 EMERGENCY DEPT VISIT LOW MDM: CPT

## 2020-06-20 PROCEDURE — 74011250636 HC RX REV CODE- 250/636: Performed by: PHYSICIAN ASSISTANT

## 2020-06-20 PROCEDURE — 74011250637 HC RX REV CODE- 250/637: Performed by: PHYSICIAN ASSISTANT

## 2020-06-20 RX ORDER — HYDROCODONE BITARTRATE AND ACETAMINOPHEN 5; 325 MG/1; MG/1
1 TABLET ORAL
Status: COMPLETED | OUTPATIENT
Start: 2020-06-20 | End: 2020-06-20

## 2020-06-20 RX ORDER — KETOROLAC TROMETHAMINE 30 MG/ML
60 INJECTION, SOLUTION INTRAMUSCULAR; INTRAVENOUS
Status: COMPLETED | OUTPATIENT
Start: 2020-06-20 | End: 2020-06-20

## 2020-06-20 RX ADMIN — HYDROCODONE BITARTRATE AND ACETAMINOPHEN 1 TABLET: 5; 325 TABLET ORAL at 15:27

## 2020-06-20 RX ADMIN — KETOROLAC TROMETHAMINE 60 MG: 30 INJECTION, SOLUTION INTRAMUSCULAR at 15:27

## 2020-06-20 NOTE — ED NOTES
Assumed care of pt from Blanca SCOTT RN. Pt resting comfortably with side rails up and call bell in reach.

## 2020-06-20 NOTE — ED PROVIDER NOTES
EMERGENCY DEPARTMENT HISTORY AND PHYSICAL EXAM      Date: 6/20/2020  Patient Name: Noe Brown    History of Presenting Illness     Chief Complaint   Patient presents with    Anal Pain     Pt states that she had two hemorrhoids removed on 5/27/2020, pt states she was here last week to receive more pain medication that she ran out of. Pt is here again today d/t needing more pain medications. Pt states that she is scheduled to have another hemorrhoid removed next week by Dr. Gina Tubbs. Pt denies sob, cough, fevers, or cp. History Provided By: Patient    HPI: Noe Brown, 37 y.o. female with significant PMHx for recent hemorhoidecmy on 5/27, presents by POV to the ED because she is out of her pain medication. The patient reports she was prescribed anti-inflammatories and narcotic pain medication from her surgeon post surgery. She had to get a 2nd round of pain medications from her surgeon. Last weekend she ran out of the pain medications and had to come to the ED and was given a 3rd Rx for narcotic pain medication, which she is now out of and and requesting another refill through the ED. She notes that she did see her surgeon, Dr. Gina Tubbs, this past Monday and she is scheduled for another internal hemorrhoidectomy this Tuesday. She is also taking Colace for constipation and an anti-inflammatory but continues with rectal pain. She denies blood in stool or bleeding from her rectum. There are no other complaints, changes, or physical findings at this time. Social Hx: Tobacco (denies), EtOH (denies), Illicit drug use (denies)     PCP: Luann Guardado NP    No current facility-administered medications on file prior to encounter. Current Outpatient Medications on File Prior to Encounter   Medication Sig Dispense Refill    ibuprofen (MOTRIN) 800 mg tablet Take 1 Tab by mouth every eight (8) hours as needed for Pain.  30 Tab 0    metFORMIN (GLUCOPHAGE) 1,000 mg tablet Take 1 Tab by mouth two (2) times daily (with meals). 61 Tab 0       Past History     Past Medical History:  Past Medical History:   Diagnosis Date    Abnormal Pap smear     abnormal pap at 15 y/o normal since     [facility]    [facility])     Diabetes mellitus     Gestational Diabetes with last two pregnancies-diet controlled       Past Surgical History:  Past Surgical History:   Procedure Laterality Date    HX  SECTION      HX CHOLECYSTECTOMY      HX OTHER SURGICAL      cholecystectomy 2002       Family History:  Family History   Problem Relation Age of Onset    Diabetes Father     Hypertension Father     Stroke Father     Kidney Disease Father        Social History:  Social History     Tobacco Use    Smoking status: Never Smoker    Smokeless tobacco: Never Used   Substance Use Topics    Alcohol use: Yes     Comment: Socially    Drug use: No       Allergies: Allergies   Allergen Reactions    Amoxicillin Rash    Tramadol Hives         Review of Systems   Review of Systems   Constitutional: Negative for chills, diaphoresis and fever. HENT: Negative for congestion, ear pain, rhinorrhea and sore throat. Respiratory: Negative for cough and shortness of breath. Cardiovascular: Negative for chest pain. Gastrointestinal: Positive for rectal pain. Negative for abdominal pain, anal bleeding, blood in stool, constipation, diarrhea, nausea and vomiting. Genitourinary: Negative for difficulty urinating, dysuria, frequency and hematuria. Musculoskeletal: Negative for arthralgias and myalgias. Neurological: Negative for headaches. All other systems reviewed and are negative. Physical Exam   Physical Exam  Vitals signs and nursing note reviewed. Constitutional:       General: She is not in acute distress. Appearance: She is well-developed. She is not diaphoretic. Comments: 37 y.o. -American female    HENT:      Head: Normocephalic and atraumatic.    Eyes: General:         Right eye: No discharge. Left eye: No discharge. Conjunctiva/sclera: Conjunctivae normal.   Neck:      Musculoskeletal: Normal range of motion and neck supple. Cardiovascular:      Rate and Rhythm: Normal rate. Pulmonary:      Effort: Pulmonary effort is normal.   Genitourinary:     Rectum: No anal fissure or external hemorrhoid. Skin:     General: Skin is warm and dry. Neurological:      Mental Status: She is alert and oriented to person, place, and time. Psychiatric:         Behavior: Behavior normal.         Diagnostic Study Results     Labs - none    Radiologic Studies - none    Medical Decision Making   I am the first provider for this patient. I reviewed the vital signs, available nursing notes, past medical history, past surgical history, family history and social history. Vital Signs-Reviewed the patient's vital signs. Patient Vitals for the past 12 hrs:   Temp Pulse Resp BP SpO2   06/20/20 1435 98.9 °F (37.2 °C) 100 18 154/90 100 %       Records Reviewed: Nursing Notes and Old Medical Records   Reviewed ED record from last weekend. Provider Notes (Medical Decision Making):   Patient presents the ED with ongoing rectal pain status post hemorrhoidectomy 3 weeks ago. She is had multiple prescriptions for pain medication is in the ED for refill as she is out of this medication again. Medications provided while the patient was in the ED. However, no prescription medicines provided at discharge. The patient should follow-up with her surgeon as needed for continued pain control. ED Course:   Initial assessment performed. The patients presenting problems have been discussed, and they are in agreement with the care plan formulated and outlined with them. I have encouraged them to ask questions as they arise throughout their visit. Critical Care Time: None    Disposition:  DISCHARGE NOTE:  3:15 PM  The pt is ready for discharge.  The pt's signs, symptoms, diagnosis, and discharge instructions have been discussed and pt has conveyed their understanding. The pt is to follow up as recommended or return to ER should their symptoms worsen. Plan has been discussed and pt is in agreement. PLAN:  1. Discharge Medication List as of 6/20/2020  3:15 PM      CONTINUE these medications which have NOT CHANGED    Details   ibuprofen (MOTRIN) 800 mg tablet Take 1 Tab by mouth every eight (8) hours as needed for Pain., Normal, Disp-30 Tab, R-0      metFORMIN (GLUCOPHAGE) 1,000 mg tablet Take 1 Tab by mouth two (2) times daily (with meals). , Normal, Disp-60 Tab, R-0           2. Follow-up Information     Follow up With Specialties Details Why Contact Info    Papi Hendricks MD General Surgery In 2 days as scheduled this coming Tuesday [facility] [id number]  [address] [place] [address]  [phone]          Return to ED if worse     Diagnosis     Clinical Impression:   1. Rectal pain          Please note that this dictation was completed with [employer], the computer voice recognition software. Quite often unanticipated grammatical, syntax, homophones, and other interpretive errors are inadvertently transcribed by the computer software. Please disregards these errors. Please excuse any errors that have escaped final proofreading. This note will not be viewable in [address]

## 2020-06-20 NOTE — DISCHARGE INSTRUCTIONS
Patient Education        Anal Pain: Care Instructions  Your Care Instructions  Pain in the opening to the rectum (anus) can be caused by diarrhea or constipation or by scratching a rectal itch. A common cause of anal pain is a tear in the lining of the lower rectum (anal fissure). This type of anal pain usually goes away when the problem clears up. Injury during anal sex or from an object being placed in the rectum also can cause pain. A rare cause of anal pain is spasms of the muscles in the rectum. Some of these conditions may cause some light bleeding. Home treatment usually can relieve anal pain. If you continue to have anal pain, your doctor may prescribe medicine to relieve pain and other symptoms. Depending on the cause, you may need other treatment. Follow-up care is a key part of your treatment and safety. Be sure to make and go to all appointments, and call your doctor if you are having problems. It's also a good idea to know your test results and keep a list of the medicines you take. How can you care for yourself at home? · Sit in a few inches of warm water (sitz bath) 3 times a day and after bowel movements. The warm water eases discomfort. Do not put soaps, salts, or shampoos in the water. · Drink plenty of fluids, enough so that your urine is light yellow or clear like water. If you have kidney, heart, or liver disease and have to limit fluids, talk with your doctor before you increase the amount of fluids you drink. · Include high-fiber foods, such as fruits, vegetables, beans, and whole grains, in your diet each day. · Take a fiber supplement, such as Benefiber, Citrucel, or Metamucil, every day. Read and follow all instructions on the label. · Use the toilet when you feel the urge. Or when you can, schedule time each day for a bowel movement. A daily routine may help. Take your time and do not strain when having a bowel movement. But do not sit on the toilet too long.   · Support your feet with a small step stool when you sit on the toilet. This helps flex your hips and places your pelvis in a squatting position. · Your doctor may recommend an over-the-counter laxative, such as Miralax, Milk of Magnesia, or Ex-Lax. Read and follow all instructions on the label, and do not use laxatives on a long-term basis. · Do not use over-the-counter ointments or creams without talking to your doctor. Some of these may not help. · Use baby wipes or medicated pads, such as Preparation H or Tucks, instead of toilet paper to clean after a bowel movement. These products do not irritate the anus. · Be safe with medicines. Read and follow all instructions on the label. ? If the doctor gave you a prescription medicine for pain, give it as prescribed. ? If you are not taking a prescription pain medicine, ask your doctor if you can take an over-the-counter medicine. When should you call for help? Call your doctor now or seek immediate medical care if:  · You have new or worse pain. · You have new or worse bleeding from the rectum. Watch closely for changes in your health, and be sure to contact your doctor if:  · You have trouble passing stools. · You do not get better as expected. Where can you learn more? Go to http://asael-sloan.info/  Enter B355 in the search box to learn more about \"Anal Pain: Care Instructions. \"  Current as of: August 12, 2019               Content Version: 12.5  © 7070-8495 [employer], [employer]. Care instructions adapted under license by [employer] (which disclaims liability or warranty for this information). If you have questions about a medical condition or this instruction, always ask your healthcare professional. [address] any warranty or liability for your use of this information.

## 2020-07-01 ENCOUNTER — APPOINTMENT (OUTPATIENT)
Dept: ULTRASOUND IMAGING | Age: 35
End: 2020-07-01
Attending: PHYSICIAN ASSISTANT
Payer: MEDICAID

## 2020-07-01 ENCOUNTER — HOSPITAL ENCOUNTER (EMERGENCY)
Age: 35
Discharge: HOME | End: 2020-07-01
Attending: EMERGENCY MEDICINE
Payer: MEDICAID

## 2020-07-01 VITALS
TEMPERATURE: 98.1 F | SYSTOLIC BLOOD PRESSURE: 139 MMHG | BODY MASS INDEX: 33.62 KG/M2 | OXYGEN SATURATION: 98 % | HEIGHT: 66 IN | RESPIRATION RATE: 12 BRPM | DIASTOLIC BLOOD PRESSURE: 80 MMHG | WEIGHT: 209.22 LBS | HEART RATE: 72 BPM

## 2020-07-01 DIAGNOSIS — E13.65 OTHER SPECIFIED DIABETES MELLITUS WITH HYPERGLYCEMIA, UNSPECIFIED WHETHER LONG TERM INSULIN USE (HCC): ICD-10-CM

## 2020-07-01 DIAGNOSIS — R60.9 PERIPHERAL EDEMA: Primary | ICD-10-CM

## 2020-07-01 LAB — CREAT BLD-MCNC: 0.6 MG/DL (ref 0.6–1.3)

## 2020-07-01 PROCEDURE — 82565 ASSAY OF CREATININE: CPT

## 2020-07-01 PROCEDURE — 99284 EMERGENCY DEPT VISIT MOD MDM: CPT

## 2020-07-01 PROCEDURE — 93970 EXTREMITY STUDY: CPT

## 2020-07-01 PROCEDURE — 80047 BASIC METABLC PNL IONIZED CA: CPT

## 2020-07-01 RX ORDER — HYDROCHLOROTHIAZIDE 25 MG/1
12.5-25 TABLET ORAL DAILY
Qty: 20 TAB | Refills: 0 | Status: SHIPPED | OUTPATIENT
Start: 2020-07-01 | End: 2020-07-21

## 2020-07-01 NOTE — ED PROVIDER NOTES
EMERGENCY DEPARTMENT HISTORY AND PHYSICAL EXAM      Date: 2020  Patient Name: Carrie Pagan    History of Presenting Illness     Chief Complaint   Patient presents with    Leg Swelling     patient reports both feet swollen since Monday. History Provided By: Patient    HPI: Carrie Pagan, 37 y.o. female with significant PMHx for DM, presents by POV to the ED with cc of bilateral lower leg and ankle swelling that started 2 days ago. The patient notes a history of the same when her electrolytes have been off or she has eaten too much salt. The patient denies recent fall, trauma, or injury. She denies chest pain, shortness of breath, palpitations. She did recently have rectal surgery for hemorrhoids but notes that she is fully recovered from this. This occurred several weeks ago. She is a non-smoker. She is not currently taking oral birth control but notes that she does receive the Depakote injection every 3 months. There are no other complaints, changes, or physical findings at this time. Social Hx: Tobacco (denies), EtOH (denies), Illicit drug use (denies)     PCP: Les Magana NP    No current facility-administered medications on file prior to encounter. Current Outpatient Medications on File Prior to Encounter   Medication Sig Dispense Refill    ibuprofen (MOTRIN) 800 mg tablet Take 1 Tab by mouth every eight (8) hours as needed for Pain. 30 Tab 0    metFORMIN (GLUCOPHAGE) 1,000 mg tablet Take 1 Tab by mouth two (2) times daily (with meals).  61 Tab 0       Past History     Past Medical History:  Past Medical History:   Diagnosis Date    Abnormal Pap smear     abnormal pap at 13 y/o normal since     [facility] pain 2015    [facility]     Diabetes mellitus     Gestational Diabetes with last two pregnancies-diet controlled       Past Surgical History:  Past Surgical History:   Procedure Laterality Date    HX  SECTION      HX CHOLECYSTECTOMY  HX OTHER SURGICAL      cholecystectomy 2002       Family History:  Family History   Problem Relation Age of Onset    Diabetes Father     Hypertension Father     Stroke Father     Kidney Disease Father        Social History:  Social History     Tobacco Use    Smoking status: Never Smoker    Smokeless tobacco: Never Used   Substance Use Topics    Alcohol use: Yes     Comment: Socially    Drug use: No       Allergies: Allergies   Allergen Reactions    Amoxicillin Rash    Tramadol Hives         Review of Systems   Review of Systems   Constitutional: Negative for chills, diaphoresis and fever. HENT: Negative for congestion, ear pain, rhinorrhea and sore throat. Respiratory: Negative for cough and shortness of breath. Cardiovascular: Positive for leg swelling. Negative for chest pain and palpitations. Gastrointestinal: Negative for abdominal pain, constipation, diarrhea, nausea and vomiting. Genitourinary: Negative for difficulty urinating, dysuria, frequency and hematuria. Musculoskeletal: Positive for joint swelling. Negative for arthralgias and myalgias. Neurological: Negative for headaches. All other systems reviewed and are negative. Physical Exam   Physical Exam  Vitals signs and nursing note reviewed. Constitutional:       General: She is not in acute distress. Appearance: She is well-developed. She is not diaphoretic. Comments: 37 y.o. -American female    HENT:      Head: Normocephalic and atraumatic. Eyes:      General:         Right eye: No discharge. Left eye: No discharge. Conjunctiva/sclera: Conjunctivae normal.   Neck:      Musculoskeletal: Normal range of motion and neck supple. Cardiovascular:      Rate and Rhythm: Normal rate. Pulses: Normal pulses. Pulmonary:      Effort: Pulmonary effort is normal.   Musculoskeletal:      Right lower leg: Edema present. Left lower leg: Edema present.       Comments: Slight tenderness palpation of bilateral lower extremities with swelling to the ankles. Palpable pedal pulses. Ambulatory without limp. Skin:     General: Skin is warm and dry. Neurological:      Mental Status: She is alert and oriented to person, place, and time. Psychiatric:         Behavior: Behavior normal.         Diagnostic Study Results     Labs -     Recent Results (from the past 12 hour(s))   CREATININE, POC    Collection Time: 07/01/20  7:34 PM   Result Value Ref Range    Creatinine (POC) 0.6 0.6 - 1.3 mg/dL    GFRAA, POC >60 >60 ml/min/1.73m2    GFRNA, POC >60 >60 ml/min/1.73m2     Patient also had a POC EC8 done that the nursing staff was unable to get the results to cross to the chart. Her BS was 330. I have discussed this hyperglycemia with the patient. She does not want this treated in the ED. She notes that she does not always take her medications as prescribed. Radiologic Studies -     Right Lower Venous   No evidence of deep vein thrombosis in the right lower extremity. No evidence of deep vein thrombosis in the common femoral, profunda femoral, femoral, popliteal, posterior tibial, and peroneal veins. The veins were imaged in the transverse and longitudinal planes. The vessels showed normal color filling and compressibility. Doppler interrogation showed phasic and spontaneous flow. Left Lower Venous   No evidence of deep vein thrombosis in the left lower extremity. No evidence of deep vein thrombosis in the common femoral, profunda femoral, femoral, popliteal, posterior tibial, and peroneal veins. The veins were imaged in the transverse and longitudinal planes. The vessels showed normal color filling and compressibility. Doppler interrogation showed phasic and spontaneous flow. Medical Decision Making   I am the first provider for this patient. I reviewed the vital signs, available nursing notes, past medical history, past surgical history, family history and social history.     Vital Signs-Reviewed the patient's vital signs. Patient Vitals for the past 12 hrs:   Temp Pulse Resp BP SpO2   07/01/20 1830  76 12 (!) 151/101 100 %   07/01/20 1815  74 10 137/77 99 %   07/01/20 1736 98.1 °F (36.7 °C) 90 16 161/90 100 %       Records Reviewed: Nursing Notes and Old Medical Records   Reviewed recent ED records for rectal pain. Provider Notes (Medical Decision Making):   Patient presents the ED with bilateral lower extremity swelling. Differential diagnosis include DVT, thrombophlebitis, CHF, peripheral edema, kidney failure. Patient requested minimal work-up done in the ED. Ultrasound of bilateral legs was negative. Point-of-care testing showed a creatinine of 0.6 but an elevated blood sugar. Patient declined treatment for this blood sugar in the ED. I recommended that she follow-up with her primary care doctor take medications as scheduled. Have placed patient on a low-dose diuretic to help with her swelling until she can follow-up with PCP. ED Course:   Initial assessment performed. The patients presenting problems have been discussed, and they are in agreement with the care plan formulated and outlined with them. I have encouraged them to ask questions as they arise throughout their visit. Critical Care Time: None    Disposition:  DISCHARGE NOTE:  7:44 PM  The pt is ready for discharge. The pt's signs, symptoms, diagnosis, and discharge instructions have been discussed and pt has conveyed their understanding. The pt is to follow up as recommended or return to ER should their symptoms worsen. Plan has been discussed and pt is in agreement. PLAN:  1. Current Discharge Medication List      START taking these medications    Details   hydroCHLOROthiazide (HYDRODIURIL) 25 mg tablet Take 0.5-1 Tabs by mouth daily for 20 days. Qty: 20 Tab, Refills: 0           2.    Follow-up Information     Follow up With Specialties Details Why Contact Info    Seth Major NP Nurse Practitioner In 2 days  [address]  [phone]          Return to ED if worse     Diagnosis     Clinical Impression:   1. Peripheral edema    2. Other specified diabetes mellitus with hyperglycemia, unspecified whether long term insulin use ([facility] [address]          Please note that this dictation was completed with [employer], the computer voice recognition software. Quite often unanticipated grammatical, syntax, homophones, and other interpretive errors are inadvertently transcribed by the computer software. Please disregards these errors. Please excuse any errors that have escaped final proofreading. This note will not be viewable in [address]

## 2020-07-01 NOTE — DISCHARGE INSTRUCTIONS
Patient Education        Leg and Ankle Edema: Care Instructions  Your Care Instructions  Swelling in the legs, ankles, and feet is called edema. It is common after you sit or stand for a while. Long plane flights or car rides often cause swelling in the legs and feet. You may also have swelling if you have to stand for long periods of time at your job. Problems with the veins in the legs (varicose veins) and changes in hormones can also cause swelling. Sometimes the swelling in the ankles and feet is caused by a more serious problem, such as heart failure, infection, blood clots, or liver or kidney disease. Follow-up care is a key part of your treatment and safety. Be sure to make and go to all appointments, and call your doctor if you are having problems. It's also a good idea to know your test results and keep a list of the medicines you take. How can you care for yourself at home? · If your doctor gave you medicine, take it as prescribed. Call your doctor if you think you are having a problem with your medicine. · Whenever you are resting, raise your legs up. Try to keep the swollen area higher than the level of your heart. · Take breaks from standing or sitting in one position. ? Walk around to increase the blood flow in your lower legs. ? Move your feet and ankles often while you stand, or tighten and relax your leg muscles. · Wear support stockings. Put them on in the morning, before swelling gets worse. · Eat a balanced diet. Lose weight if you need to. · Limit the amount of salt (sodium) in your diet. Salt holds fluid in the body and may increase swelling. When should you call for help? [id number] anytime you think you may need emergency care. For example, call if:  · You have symptoms of a blood clot in your lung (called a pulmonary embolism). These may include:  ? Sudden chest pain. ? Trouble breathing. ? Coughing up blood.   Call your doctor now or seek immediate medical care if:  · You have signs of a blood clot, such as:  ? Pain in your calf, back of the knee, thigh, or groin. ? Redness and swelling in your leg or groin. · You have symptoms of infection, such as:  ? Increased pain, swelling, warmth, or redness. ? Red streaks or pus. ? A fever. Watch closely for changes in your health, and be sure to contact your doctor if:  · Your swelling is getting worse. · You have new or worsening pain in your legs. · You do not get better as expected. Where can you learn more? Go to http://asael-sloan.info/  Enter Q758 in the search box to learn more about \"Leg and Ankle Edema: Care Instructions. \"  Current as of: June 26, 2019               Content Version: 12.5  © 9613-9347 [employer]. Care instructions adapted under license by [employer] (which disclaims liability or warranty for this information). If you have questions about a medical condition or this instruction, always ask your healthcare professional. [place] [address] any warranty or liability for your use of this information. Patient Education        Learning About Type 2 Diabetes  What is type 2 diabetes? Insulin is a hormone that helps your body use sugar from your food as energy. Type 2 diabetes happens when your body can't use insulin the right way. Over time, the pancreas can't make enough insulin. If you don't have enough insulin, too much sugar stays in your blood. If you are overweight, get little or no exercise, or have type 2 diabetes in your family, you are more likely to have problems with the way insulin works in your body.  Americans, Hispanics, Native Americans,  Americans, and Pacific Islanders have a higher risk for type 2 diabetes. Type 2 diabetes can be prevented or delayed with a healthy lifestyle, which includes staying at a healthy weight, making smart food choices, and getting regular exercise.   What can you expect with type 2 diabetes? Jayden Shi keep hearing about how important it is to keep your blood sugar within a target range. That's because over time, high blood sugar can lead to serious problems. It can:  · Harm your eyes, nerves, and kidneys. · Damage your blood vessels, leading to heart disease and stroke. · Reduce blood flow and cause nerve damage to parts of your body, especially your feet. This can cause slow healing and pain when you walk. · Make your immune system weak and less able to fight infections. When people hear the word \"diabetes,\" they often think of problems like these. But daily care and treatment can help prevent or delay these problems. The goal is to keep your blood sugar in a target range. That's the best way to reduce your chance of having more problems from diabetes. What are the symptoms? Some people who have type 2 diabetes may not have any symptoms early on. Many people with the disease don't even know they have it at first. But with time, diabetes starts to cause symptoms. You experience most symptoms of type 2 diabetes when your blood sugar is either too high or too low. The most common symptoms of high blood sugar include:  · Thirst.  · Frequent urination. · Weight loss. · Blurry vision. The symptoms of low blood sugar include:  · Sweating. · Shakiness. · Weakness. · Hunger. · Confusion. How can you prevent type 2 diabetes? The best way to prevent or delay type 2 diabetes is to adopt healthy habits, which include:  · Staying at a healthy weight. · Exercising regularly. · Eating healthy foods. How is type 2 diabetes treated? If you have type 2 diabetes, here are the most important things you can do. · Take your diabetes medicines. · Check your blood sugar as often as your doctor recommends. Also, get a hemoglobin A1c test at least every 6 months. · Try to eat a variety of foods and to spread carbohydrate throughout the day.  Carbohydrate raises blood sugar higher and more quickly than any other nutrient does. Carbohydrate is found in sugar, breads and cereals, fruit, starchy vegetables such as potatoes and corn, and milk and yogurt. · Get at least 30 minutes of exercise on most days of the week. Walking is a good choice. You also may want to do other activities, such as running, swimming, cycling, or playing tennis or team sports. If your doctor says it's okay, do muscle-strengthening exercises at least 2 times a week. · See your doctor for checkups and tests on a regular schedule. · If you have high blood pressure or high cholesterol, take the medicines as prescribed by your doctor. · Do not smoke. Smoking can make health problems worse. This includes problems you might have with type 2 diabetes. If you need help quitting, talk to your doctor about stop-smoking programs and medicines. These can increase your chances of quitting for good. Follow-up care is a key part of your treatment and safety. Be sure to make and go to all appointments, and call your doctor if you are having problems. It's also a good idea to know your test results and keep a list of the medicines you take. Where can you learn more? Go to http://asael-sloan.info/  Enter [id number] in the search box to learn more about \"Learning About Type 2 Diabetes. \"  Current as of: December 20, 2019               Content Version: 12.5  © 7760-0627 [employer]. Care instructions adapted under license by [employer] (which disclaims liability or warranty for this information). If you have questions about a medical condition or this instruction, always ask your healthcare professional. [place] [address] any warranty or liability for your use of this information.

## 2020-07-01 NOTE — ED NOTES
Pt refusing to be stick for blood sample. ANDREW Milian at bedside to update pt and explain importance of Creatinine value.

## 2020-07-01 NOTE — ED NOTES
Patient discharged by Brady Pearson. Patient provided with discharge instructions Rx and instructions on follow up care. Patient out of ED ambulatory accompanied by self.

## 2020-07-02 ENCOUNTER — PATIENT OUTREACH (OUTPATIENT)
Dept: CARDIOLOGY CLINIC | Age: 35
End: 2020-07-02

## 2020-07-02 LAB
BUN BLD-MCNC: 5 MG/DL (ref 9–20)
CA-I BLD-MCNC: 1.19 MMOL/L (ref 1.12–1.32)
CHLORIDE BLD-SCNC: 96 MMOL/L (ref 98–107)
GLUCOSE BLD-MCNC: 330 MG/DL (ref 65–100)
HCT VFR BLD CALC: 39 % (ref 35–47)
POTASSIUM BLD-SCNC: 4 MMOL/L (ref 3.5–5.1)
SERVICE CMNT-IMP: ABNORMAL
SODIUM BLD-SCNC: 134 MMOL/L (ref 136–145)

## 2020-07-07 ENCOUNTER — PATIENT OUTREACH (OUTPATIENT)
Dept: CASE MANAGEMENT | Age: 35
End: 2020-07-07

## 2020-07-07 NOTE — PROGRESS NOTES
Patient contacted regarding recent discharge and COVID-19 risk. Discussed COVID-19 related testing which was not done at this time. Test results were not done. Patient informed of results, if available? no    Care Transition Nurse/ Ambulatory Care Manager contacted the patient by telephone to perform post discharge assessment. Verified name and  with patient as identifiers. Patient has following risk factors of: diabetes. CTN/ACM reviewed discharge instructions, medical action plan and red flags related to discharge diagnosis. Reviewed and educated them on any new and changed medications related to discharge diagnosis. Advised obtaining a 90-day supply of all daily and as-needed medications. Education provided regarding infection prevention, and signs and symptoms of COVID-19 and when to seek medical attention with patient who verbalized understanding. Discussed exposure protocols and quarantine from [address] you at higher risk for severe illness  and given an opportunity for questions and concerns. The patient agrees to contact the COVID-19 hotline [phone] or PCP office for questions related to their healthcare. CTN/ACM provided contact information for future reference. From CDC: Are you at higher risk for severe illness?  Wash your hands often.  Avoid close contact (6 feet, which is about two arm lengths) with people who are sick.  Put distance between yourself and other people if COVID-19 is spreading in your community.  Clean and disinfect frequently touched surfaces.  Avoid all cruise travel and non-essential air travel.  Call your healthcare professional if you have concerns about COVID-19 and your underlying condition or if you are sick.     For more information on steps you can take to protect yourself, see CDC's How to Protect Yourself      Patient/family/caregiver given information for Jose Raul Casas and agrees to enroll no  Patient's preferred e-mail: n/a  Patient's preferred phone number: n/a  Based on Loop alert triggers, patient will be contacted by nurse care manager for worsening symptoms. Plan for follow-up call in 7-14 days based on severity of symptoms and risk factors.

## 2020-07-21 ENCOUNTER — PATIENT OUTREACH (OUTPATIENT)
Dept: CASE MANAGEMENT | Age: 35
End: 2020-07-21

## 2020-12-12 ENCOUNTER — OFFICE VISIT (OUTPATIENT)
Dept: URGENT CARE | Age: 35
End: 2020-12-12
Payer: MEDICAID

## 2020-12-12 VITALS — OXYGEN SATURATION: 95 % | RESPIRATION RATE: 18 BRPM | TEMPERATURE: 98.5 F | HEART RATE: 86 BPM

## 2020-12-12 DIAGNOSIS — K04.7 DENTAL ABSCESS: Primary | ICD-10-CM

## 2020-12-12 PROCEDURE — 99212 OFFICE O/P EST SF 10 MIN: CPT | Performed by: FAMILY MEDICINE

## 2020-12-12 RX ORDER — CLINDAMYCIN HYDROCHLORIDE 300 MG/1
300 CAPSULE ORAL 3 TIMES DAILY
Qty: 30 CAP | Refills: 0 | Status: SHIPPED | OUTPATIENT
Start: 2020-12-12 | End: 2020-12-22

## 2020-12-12 NOTE — PROGRESS NOTES
This patient was seen at [address] [place] [address] Urgent Care while in their vehicle due to COVID-19 pandemic with PPE and focused examination in order to decrease community viral transmission. The patient/guardian gave verbal consent to treat. Dental Problem    The history is provided by the patient. This is a new problem. The current episode started more than 2 days ago. The problem occurs daily. The problem has not changed since onset. The pain is located in the right lower and left lower mouth. The quality of the pain is aching. The pain is at a severity of 6/10. The pain is moderate. Associated symptoms include swelling (on both side of lower jaw- ) and gum redness (and pain ). She has tried nothing for the symptoms.         Past Medical History:   Diagnosis Date    Abnormal Pap smear     abnormal pap at 13 y/o normal since     Back pain 2015    [facility]     Diabetes mellitus     Gestational Diabetes with last two pregnancies-diet controlled        Past Surgical History:   Procedure Laterality Date    HX  SECTION      HX CHOLECYSTECTOMY      HX OTHER SURGICAL      cholecystectomy 2002         Family History   Problem Relation Age of Onset    Diabetes Father     Hypertension Father     Stroke Father     Kidney Disease Father         Social History     Socioeconomic History    Marital status: SINGLE     Spouse name: Not on file    Number of children: Not on file    Years of education: Not on file    Highest education level: Not on file   Occupational History    Not on file   Social Needs    Financial resource strain: Not on file    Food insecurity     Worry: Not on file     Inability: Not on file    Transportation needs     Medical: Not on file     Non-medical: Not on file   Tobacco Use    Smoking status: Never Smoker    Smokeless tobacco: Never Used   Substance and Sexual Activity    Alcohol use: Yes     Comment: Socially    Drug use: No    Sexual activity: Yes     Birth control/protection: None   Lifestyle    Physical activity     Days per week: Not on file     Minutes per session: Not on file    Stress: Not on file   Relationships    Social connections     Talks on phone: Not on file     Gets together: Not on file     Attends Taoism service: Not on file     Active member of club or organization: Not on file     Attends meetings of clubs or organizations: Not on file     Relationship status: Not on file    Intimate partner violence     Fear of current or ex partner: Not on file     Emotionally abused: Not on file     Physically abused: Not on file     Forced sexual activity: Not on file   Other Topics Concern    Not on file   Social History Narrative    Not on file                ALLERGIES: Amoxicillin and Tramadol    Review of Systems   HENT: Positive for dental problem. All other systems reviewed and are negative. Vitals:    12/12/20 1651   Pulse: 86   Resp: 18   Temp: 98.5 °F (36.9 °C)   SpO2: 95%       Physical Exam  Vitals signs and nursing note reviewed. HENT:      Head:      Jaw: Swelling (on rt side- tenderness) present. Mouth/Throat:      Dentition: Dental abscesses present. MDM    Procedures      ICD-10-CM ICD-9-CM    1. Dental abscess  K04.7 522.5      Medications Ordered Today   Medications    clindamycin (CLEOCIN) 300 mg capsule     Sig: Take 1 Cap by mouth three (3) times daily for 10 days. Dispense:  30 Cap     Refill:  0     No results found for any visits on 12/12/20. The patients condition was discussed with the patient and they understand. The patient is to follow up with primary care doctor. If signs and symptoms become worse the pt is to go to the ER. The patient is to take medications as prescribed.

## 2022-01-02 ENCOUNTER — HOSPITAL ENCOUNTER (EMERGENCY)
Age: 37
Discharge: HOME | End: 2022-01-02
Attending: EMERGENCY MEDICINE
Payer: MEDICAID

## 2022-01-02 VITALS
BODY MASS INDEX: 37.54 KG/M2 | HEIGHT: 65 IN | RESPIRATION RATE: 14 BRPM | WEIGHT: 225.31 LBS | SYSTOLIC BLOOD PRESSURE: 175 MMHG | TEMPERATURE: 98.5 F | DIASTOLIC BLOOD PRESSURE: 91 MMHG | HEART RATE: 92 BPM | OXYGEN SATURATION: 100 %

## 2022-01-02 DIAGNOSIS — R30.0 DYSURIA: Primary | ICD-10-CM

## 2022-01-02 LAB
APPEARANCE UR: ABNORMAL
BACTERIA URNS QL MICRO: ABNORMAL /HPF
BILIRUB UR QL: NEGATIVE
COLOR UR: ABNORMAL
EPITH CASTS URNS QL MICRO: ABNORMAL /LPF
GLUCOSE UR STRIP.AUTO-MCNC: >1000 MG/DL
HCG UR QL: NEGATIVE
HGB UR QL STRIP: ABNORMAL
KETONES UR QL STRIP.AUTO: NEGATIVE MG/DL
LEUKOCYTE ESTERASE UR QL STRIP.AUTO: ABNORMAL
NITRITE UR QL STRIP.AUTO: NEGATIVE
PH UR STRIP: 6 [PH] (ref 5–8)
PROT UR STRIP-MCNC: 100 MG/DL
RBC #/AREA URNS HPF: ABNORMAL /HPF (ref 0–5)
SP GR UR REFRACTOMETRY: 1.03 (ref 1–1.03)
UA: UC IF INDICATED,UAUC: ABNORMAL
UROBILINOGEN UR QL STRIP.AUTO: 0.2 EU/DL (ref 0.2–1)
WBC URNS QL MICRO: >100 /HPF (ref 0–4)

## 2022-01-02 PROCEDURE — 87186 SC STD MICRODIL/AGAR DIL: CPT

## 2022-01-02 PROCEDURE — 81025 URINE PREGNANCY TEST: CPT

## 2022-01-02 PROCEDURE — 74011250636 HC RX REV CODE- 250/636: Performed by: EMERGENCY MEDICINE

## 2022-01-02 PROCEDURE — 99283 EMERGENCY DEPT VISIT LOW MDM: CPT

## 2022-01-02 PROCEDURE — 74011000250 HC RX REV CODE- 250: Performed by: EMERGENCY MEDICINE

## 2022-01-02 PROCEDURE — 96372 THER/PROPH/DIAG INJ SC/IM: CPT

## 2022-01-02 PROCEDURE — 87077 CULTURE AEROBIC IDENTIFY: CPT

## 2022-01-02 PROCEDURE — 81001 URINALYSIS AUTO W/SCOPE: CPT

## 2022-01-02 PROCEDURE — 87086 URINE CULTURE/COLONY COUNT: CPT

## 2022-01-02 RX ORDER — PHENAZOPYRIDINE HYDROCHLORIDE 200 MG/1
200 TABLET, FILM COATED ORAL 3 TIMES DAILY
Qty: 6 TABLET | Refills: 0 | Status: SHIPPED | OUTPATIENT
Start: 2022-01-02 | End: 2022-01-04

## 2022-01-02 RX ORDER — CEPHALEXIN 500 MG/1
500 CAPSULE ORAL 4 TIMES DAILY
Qty: 28 CAPSULE | Refills: 0 | Status: SHIPPED | OUTPATIENT
Start: 2022-01-02 | End: 2022-01-09

## 2022-01-02 RX ADMIN — LIDOCAINE HYDROCHLORIDE 1 G: 10 INJECTION, SOLUTION EPIDURAL; INFILTRATION; INTRACAUDAL; PERINEURAL at 08:16

## 2022-01-02 NOTE — ED PROVIDER NOTES
EMERGENCY DEPARTMENT HISTORY AND PHYSICAL EXAM      Date: 2022  Patient Name: Lizzie Rodríguez    History of Presenting Illness     Chief Complaint   Patient presents with    Urgency     feeling urgency with urination onset on friday. feels like uti         HPI: Lizzie Rodríguez, 43 y.o. female with history of frequent urinary tract infections presenting to ED with symptoms she states are identical to previous episodes of urinary tract infections. She describes urinary urgency and lower pelvic pain. Symptoms pretty mild but she does not want them to get worse. No other symptoms. There are no other complaints, changes, or physical findings at this time. PCP: Luisa Cosby NP    No current facility-administered medications on file prior to encounter. Current Outpatient Medications on File Prior to Encounter   Medication Sig Dispense Refill    metFORMIN (GLUCOPHAGE) 1,000 mg tablet Take 1 Tab by mouth two (2) times daily (with meals). 61 Tab 0       Past History     Past Medical History:  Past Medical History:   Diagnosis Date    Abnormal Pap smear     abnormal pap at 13 y/o normal since     [facility] pain [facility]     Diabetes mellitus     Gestational Diabetes with last two pregnancies-diet controlled       Past Surgical History:  Past Surgical History:   Procedure Laterality Date    HX  SECTION      HX CHOLECYSTECTOMY      HX OTHER SURGICAL      cholecystectomy        Family History:  Family History   Problem Relation Age of Onset    Diabetes Father     Hypertension Father     Stroke Father     Kidney Disease Father        Social History:  Social History     Tobacco Use    Smoking status: Never Smoker    Smokeless tobacco: Never Used   Substance Use Topics    Alcohol use: Yes     Comment: Socially    Drug use: No       Allergies:   Allergies   Allergen Reactions    Amoxicillin Rash    Tramadol Hives         Review of Systems Review of Systems   Constitutional: Negative for chills and fever. HENT: Negative for rhinorrhea. Eyes: Negative for redness. Respiratory: Negative for shortness of breath. Cardiovascular: Negative for chest pain. Gastrointestinal: Negative for abdominal pain. Genitourinary: Positive for pelvic pain and urgency. Negative for dysuria. Musculoskeletal: Negative for back pain. Neurological: Negative for syncope. Psychiatric/Behavioral: The patient is not nervous/anxious. All other systems reviewed and are negative. Physical Exam   Physical Exam  Vitals and nursing note reviewed. Constitutional:       Appearance: Normal appearance. HENT:      Head: Normocephalic and atraumatic. Mouth/Throat:      Mouth: Mucous membranes are moist.   Eyes:      Extraocular Movements: Extraocular movements intact. Cardiovascular:      Rate and Rhythm: Normal rate and regular rhythm. Pulses: Normal pulses. Pulmonary:      Effort: Pulmonary effort is normal.      Breath sounds: Normal breath sounds. Abdominal:      Palpations: Abdomen is soft. Tenderness: There is no abdominal tenderness. Musculoskeletal:         General: No deformity. Cervical back: Neck supple. Skin:     General: Skin is warm and dry. Neurological:      General: No focal deficit present. Mental Status: She is alert.    Psychiatric:         Mood and Affect: Mood normal.         Behavior: Behavior normal.         Diagnostic Study Results     Labs -     Recent Results (from the past 24 hour(s))   URINALYSIS W/ REFLEX CULTURE    Collection Time: 01/02/22  7:29 AM    Specimen: Urine   Result Value Ref Range    Color YELLOW/STRAW      Appearance CLOUDY (A) CLEAR      Specific gravity 1.028 1.003 - 1.030      pH (UA) 6.0 5.0 - 8.0      Protein 100 (A) NEG mg/dL    Glucose >1,000 (A) NEG mg/dL    Ketone Negative NEG mg/dL    Bilirubin Negative NEG      Blood LARGE (A) NEG      Urobilinogen 0.2 0.2 - 1.0 EU/dL Nitrites Negative NEG      Leukocyte Esterase MODERATE (A) NEG      WBC >100 (H) 0 - 4 /hpf    RBC 20-50 0 - 5 /hpf    Epithelial cells FEW FEW /lpf    Bacteria 1+ (A) NEG /hpf    UA:UC IF INDICATED URINE CULTURE ORDERED (A) CNI     HCG URINE, QL    Collection Time: 01/02/22  7:29 AM   Result Value Ref Range    HCG urine, QL Negative NEG         Radiologic Studies -   No orders to display     CT Results  (Last 48 hours)    None        CXR Results  (Last 48 hours)    None            Medical Decision Making   I am the first provider for this patient. I reviewed the vital signs, available nursing notes, past medical history, past surgical history, family history and social history. Vital Signs-Reviewed the patient's vital signs. Patient Vitals for the past 24 hrs:   Temp Pulse Resp BP SpO2   01/02/22 0713 98.5 °F (36.9 °C) 92 14 (!) 175/91 100 %         Provider Notes (Medical Decision Making):   75-year-old with history of urinary tract infections presenting to ED with symptoms suggestive of urinary tract infection. Urinalysis confirms urinary tract infection. Given Rocephin in the ED and will discharge with Keflex and Pyridium. Advised her to follow-up primary care for further evaluation. Return precautions given. ED Course:     Initial assessment performed. The patients presenting problems have been discussed, and they are in agreement with the care plan formulated and outlined with them. I have encouraged them to ask questions as they arise throughout their visit. Disposition:  dc  PLAN:  1. Current Discharge Medication List      START taking these medications    Details   cephALEXin (Keflex) 500 mg capsule Take 1 Capsule by mouth four (4) times daily for 7 days. Qty: 28 Capsule, Refills: 0  Start date: 1/2/2022, End date: 1/9/2022      phenazopyridine (Pyridium) 200 mg tablet Take 1 Tablet by mouth three (3) times daily for 2 days.   Qty: 6 Tablet, Refills: 0  Start date: 1/2/2022, End date: 1/4/2022           2.    Follow-up Information    None       Return to ED if worse     Diagnosis     Clinical Impression: UTI

## 2022-01-04 LAB
BACTERIA SPEC CULT: ABNORMAL
BACTERIA SPEC CULT: ABNORMAL
CC UR VC: ABNORMAL
SERVICE CMNT-IMP: ABNORMAL

## 2022-12-08 ENCOUNTER — HOSPITAL ENCOUNTER (EMERGENCY)
Age: 37
Discharge: HOME | End: 2022-12-08
Attending: STUDENT IN AN ORGANIZED HEALTH CARE EDUCATION/TRAINING PROGRAM
Payer: MEDICAID

## 2022-12-08 VITALS
HEART RATE: 88 BPM | OXYGEN SATURATION: 100 % | HEIGHT: 68 IN | RESPIRATION RATE: 16 BRPM | BODY MASS INDEX: 35.08 KG/M2 | SYSTOLIC BLOOD PRESSURE: 189 MMHG | WEIGHT: 231.48 LBS | TEMPERATURE: 98.6 F | DIASTOLIC BLOOD PRESSURE: 90 MMHG

## 2022-12-08 DIAGNOSIS — I10 HYPERTENSION, UNSPECIFIED TYPE: Primary | ICD-10-CM

## 2022-12-08 DIAGNOSIS — R60.9 PERIPHERAL EDEMA: ICD-10-CM

## 2022-12-08 DIAGNOSIS — R73.9 HYPERGLYCEMIA: ICD-10-CM

## 2022-12-08 DIAGNOSIS — D64.9 ANEMIA, UNSPECIFIED TYPE: ICD-10-CM

## 2022-12-08 LAB
ALBUMIN SERPL-MCNC: 2.8 G/DL (ref 3.5–5)
ALBUMIN/GLOB SERPL: 0.8 {RATIO} (ref 1.1–2.2)
ALP SERPL-CCNC: 71 U/L (ref 45–117)
ALT SERPL-CCNC: 14 U/L (ref 12–78)
ANION GAP SERPL CALC-SCNC: 8 MMOL/L (ref 5–15)
AST SERPL-CCNC: 9 U/L (ref 15–37)
BASOPHILS # BLD: 0 K/UL (ref 0–0.1)
BASOPHILS NFR BLD: 0 % (ref 0–1)
BILIRUB SERPL-MCNC: 0.3 MG/DL (ref 0.2–1)
BUN SERPL-MCNC: 16 MG/DL (ref 6–20)
BUN/CREAT SERPL: 9 (ref 12–20)
CALCIUM SERPL-MCNC: 8.6 MG/DL (ref 8.5–10.1)
CHLORIDE SERPL-SCNC: 104 MMOL/L (ref 97–108)
CO2 SERPL-SCNC: 26 MMOL/L (ref 21–32)
CREAT SERPL-MCNC: 1.75 MG/DL (ref 0.55–1.02)
DIFFERENTIAL METHOD BLD: ABNORMAL
EOSINOPHIL # BLD: 0.1 K/UL (ref 0–0.4)
EOSINOPHIL NFR BLD: 1 % (ref 0–7)
ERYTHROCYTE [DISTWIDTH] IN BLOOD BY AUTOMATED COUNT: 12 % (ref 11.5–14.5)
GLOBULIN SER CALC-MCNC: 3.5 G/DL (ref 2–4)
GLUCOSE SERPL-MCNC: 301 MG/DL (ref 65–100)
HCT VFR BLD AUTO: 25.7 % (ref 35–47)
HGB BLD-MCNC: 8.5 G/DL (ref 11.5–16)
IMM GRANULOCYTES # BLD AUTO: 0 K/UL (ref 0–0.04)
IMM GRANULOCYTES NFR BLD AUTO: 1 % (ref 0–0.5)
LYMPHOCYTES # BLD: 2.2 K/UL (ref 0.8–3.5)
LYMPHOCYTES NFR BLD: 29 % (ref 12–49)
MCH RBC QN AUTO: 28.2 PG (ref 26–34)
MCHC RBC AUTO-ENTMCNC: 33.1 G/DL (ref 30–36.5)
MCV RBC AUTO: 85.4 FL (ref 80–99)
MONOCYTES # BLD: 0.4 K/UL (ref 0–1)
MONOCYTES NFR BLD: 6 % (ref 5–13)
NEUTS SEG # BLD: 5.1 K/UL (ref 1.8–8)
NEUTS SEG NFR BLD: 63 % (ref 32–75)
NRBC # BLD: 0 K/UL (ref 0–0.01)
NRBC BLD-RTO: 0 PER 100 WBC
PLATELET # BLD AUTO: 253 K/UL (ref 150–400)
PMV BLD AUTO: 10.2 FL (ref 8.9–12.9)
POTASSIUM SERPL-SCNC: 4.1 MMOL/L (ref 3.5–5.1)
PROT SERPL-MCNC: 6.3 G/DL (ref 6.4–8.2)
RBC # BLD AUTO: 3.01 M/UL (ref 3.8–5.2)
SODIUM SERPL-SCNC: 138 MMOL/L (ref 136–145)
TROPONIN-HIGH SENSITIVITY: 16 NG/L (ref 0–51)
WBC # BLD AUTO: 7.9 K/UL (ref 3.6–11)

## 2022-12-08 PROCEDURE — 93005 ELECTROCARDIOGRAM TRACING: CPT

## 2022-12-08 PROCEDURE — 80053 COMPREHEN METABOLIC PANEL: CPT

## 2022-12-08 PROCEDURE — 84484 ASSAY OF TROPONIN QUANT: CPT

## 2022-12-08 PROCEDURE — 85025 COMPLETE CBC W/AUTO DIFF WBC: CPT

## 2022-12-08 PROCEDURE — 99284 EMERGENCY DEPT VISIT MOD MDM: CPT

## 2022-12-08 PROCEDURE — 36415 COLL VENOUS BLD VENIPUNCTURE: CPT

## 2022-12-08 NOTE — Clinical Note
[address]  [facility] EMERGENCY DEPT  [address] [id number]  [phone]    Work/School Note    Date: 12/8/2022    To Whom It May concern:    Mario Darling was seen and treated today in the emergency room by the following provider(s):  Attending Provider: Aundrea Saldivar MD.      Mario Darling is excused from work/school on 12/08/22 and 12/09/22. She is medically clear to return to work/school on 12/10/2022.        Sincerely,          Starlette Cranker, MD

## 2022-12-08 NOTE — ED PROVIDER NOTES
EMERGENCY DEPARTMENT HISTORY AND PHYSICAL EXAM      Date: 12/8/2022  Patient Name: Pam Caballero    History of Presenting Illness     Chief Complaint   Patient presents with    Hypertension     Pt ambulatory into triage w/ cc HTN, intermittently SOB and BLE swelling x months. Pt has appointment with PCP tomorrow but was unable to be seen today, pt was told to come by employer today d/t /115. Pt currently takes lisinopril and hctz, pt unsure if she took meds today. Pt has 3+ pitting edema to BLE. Pt also notes intermittent nausea and midepigastric pain        History Provided By: Patient    HPI: Pam Caballero, 42 y.o. female with a past medical history significant for medical problems as stated below presents to the ED with cc of hypertension. She reports she was at her job earlier today when she was feeling more fatigued than usual.  Her manager took her BP and found that it was elevated. She reports that she forgot to take her blood pressure medications of lisinopril and HCTZ this AM.  Denied chest pain or shortness of breath and just stated that she felt more fatigued. She has had swelling of her lower extremities over the past few months. She has been intermittently taking blood pressure medications over that period of time as well. She has a followup with a cardiologist scheduled for tomorrow. There are no other associated symptoms. No other exacerbating or ameliorating factors. She reports her blood pressure is usually in systolic of 117I. Just finished her period and reports these are usually heavy. PCP: Mikie Chavez NP    No current facility-administered medications on file prior to encounter. Current Outpatient Medications on File Prior to Encounter   Medication Sig Dispense Refill    metFORMIN (GLUCOPHAGE) 1,000 mg tablet Take 1 Tab by mouth two (2) times daily (with meals).  61 Tab 0       Past History     Past Medical History:  Past Medical History:   Diagnosis Date    Abnormal Pap smear     abnormal pap at 13 y/o normal since     Back pain 2015    [facility]  emergency [facility]    Diabetes [facility])     Diabetes mellitus     Gestational Diabetes with last two pregnancies-diet controlled       Past Surgical History:  Past Surgical History:   Procedure Laterality Date    HX  SECTION      HX CHOLECYSTECTOMY      HX OTHER SURGICAL      cholecystectomy 2002       Family History:  Family History   Problem Relation Age of Onset    Diabetes Father     Hypertension Father     Stroke Father     Kidney Disease Father        Social History:  Social History     Tobacco Use    Smoking status: Never    Smokeless tobacco: Never   Substance Use Topics    Alcohol use: Yes     Comment: Socially    Drug use: No       Allergies: Allergies   Allergen Reactions    Amoxicillin Rash    Tramadol Hives     Review of Systems   Review of Systems   Constitutional:  Positive for fatigue. Negative for activity change, chills and fever. High blood pressure   HENT:  Negative for sore throat. Respiratory:  Negative for shortness of breath. Cardiovascular:  Positive for leg swelling. Negative for chest pain. Gastrointestinal:  Negative for abdominal pain, nausea and vomiting. Genitourinary:  Negative for difficulty urinating, dysuria and hematuria. Musculoskeletal:  Negative for myalgias. Skin:  Negative for color change. Neurological:  Negative for dizziness, weakness and headaches. Psychiatric/Behavioral:  Negative for agitation. Physical Exam   Physical Exam  Constitutional:       Appearance: Normal appearance. HENT:      Head: Normocephalic and atraumatic. Mouth/Throat:      Mouth: Mucous membranes are moist.   Eyes:      Pupils: Pupils are equal, round, and reactive to light. Cardiovascular:      Rate and Rhythm: Normal rate and regular rhythm. Heart sounds: Normal heart sounds. Pulmonary:      Effort: Pulmonary effort is normal.      Breath sounds: Normal breath sounds. Abdominal:      General: Abdomen is flat. Bowel sounds are normal.   Musculoskeletal:         General: Normal range of motion. Cervical back: Normal range of motion and neck supple. Right lower leg: Edema present. Left lower leg: Edema present. Skin:     General: Skin is warm and dry. Capillary Refill: Capillary refill takes less than 2 seconds. Neurological:      General: No focal deficit present. Mental Status: She is alert. Diagnostic Study Results     Labs -     Recent Results (from the past 24 hour(s))   EKG, 12 LEAD, INITIAL    Collection Time: 12/08/22  1:54 PM   Result Value Ref Range    Ventricular Rate 97 BPM    Atrial Rate 97 BPM    P-R Interval 134 ms    QRS Duration 70 ms    Q-T Interval 340 ms    QTC Calculation (Bezet) 431 ms    Calculated P Axis 64 degrees    Calculated R Axis 5 degrees    Calculated T Axis 96 degrees    Diagnosis       Normal sinus rhythm  Abnormal QRS-T angle, consider primary T wave abnormality  When compared with ECG of 17-JAN-2015 06:47,  T wave inversion now evident in Lateral leads     CBC WITH AUTOMATED DIFF    Collection Time: 12/08/22  1:58 PM   Result Value Ref Range    WBC 7.9 3.6 - 11.0 K/uL    RBC 3.01 (L) 3.80 - 5.20 M/uL    HGB 8.5 (L) 11.5 - 16.0 g/dL    HCT 25.7 (L) 35.0 - 47.0 %    MCV 85.4 80.0 - 99.0 FL    MCH 28.2 26.0 - 34.0 PG    MCHC 33.1 30.0 - 36.5 g/dL    RDW 12.0 11.5 - 14.5 %    PLATELET 863 209 - 779 K/uL    MPV 10.2 8.9 - 12.9 FL    NRBC 0.0 0  WBC    ABSOLUTE NRBC 0.00 0.00 - 0.01 K/uL    NEUTROPHILS 63 32 - 75 %    LYMPHOCYTES 29 12 - 49 %    MONOCYTES 6 5 - 13 %    EOSINOPHILS 1 0 - 7 %    BASOPHILS 0 0 - 1 %    IMMATURE GRANULOCYTES 1 (H) 0.0 - 0.5 %    ABS. NEUTROPHILS 5.1 1.8 - 8.0 K/UL    ABS. LYMPHOCYTES 2.2 0.8 - 3.5 K/UL    ABS. MONOCYTES 0.4 0.0 - 1.0 K/UL    ABS. EOSINOPHILS 0.1 0.0 - 0.4 K/UL    ABS. BASOPHILS 0.0 0.0 - 0.1 K/UL    ABS. IMM.  GRANS. 0.0 0.00 - 0.04 K/UL    DF AUTOMATED METABOLIC PANEL, COMPREHENSIVE    Collection Time: 12/08/22  1:58 PM   Result Value Ref Range    Sodium 138 136 - 145 mmol/L    Potassium 4.1 3.5 - 5.1 mmol/L    Chloride 104 97 - 108 mmol/L    CO2 26 21 - 32 mmol/L    Anion gap 8 5 - 15 mmol/L    Glucose 301 (H) 65 - 100 mg/dL    BUN 16 6 - 20 MG/DL    Creatinine 1.75 (H) 0.55 - 1.02 MG/DL    BUN/Creatinine ratio 9 (L) 12 - 20      eGFR 38 (L) >60 ml/min/1.73m2    Calcium 8.6 8.5 - 10.1 MG/DL    Bilirubin, total 0.3 0.2 - 1.0 MG/DL    ALT (SGPT) 14 12 - 78 U/L    AST (SGOT) 9 (L) 15 - 37 U/L    Alk. phosphatase 71 45 - 117 U/L    Protein, total 6.3 (L) 6.4 - 8.2 g/dL    Albumin 2.8 (L) 3.5 - 5.0 g/dL    Globulin 3.5 2.0 - 4.0 g/dL    A-G Ratio 0.8 (L) 1.1 - 2.2     TROPONIN-HIGH SENSITIVITY    Collection Time: 12/08/22  1:58 PM   Result Value Ref Range    Troponin-High Sensitivity 16 0 - 51 ng/L       Radiologic Studies -   No orders to display     CT Results  (Last 48 hours)      None          CXR Results  (Last 48 hours)      None              Medical Decision Making   I am the first provider for this patient. I reviewed the vital signs, available nursing notes, past medical history, past surgical history, family history and social history. Vital Signs-Reviewed the patient's vital signs. Patient Vitals for the past 12 hrs:   Pulse Resp BP SpO2   12/08/22 1635 88 16 (!) 189/90 100 %       Records Reviewed: Nursing records and medical records reviewed    Provider Notes (Medical Decision Making):   Patient presents to the emergency department with hypertension. Reports that she did not take her usual blood pressure medications this morning and feels well currently. She reports that she was fatigued at work earlier, but now feels at her baseline. I suspect patient's blood pressure is consistently elevated. Labs notable for stage 1 ROGERS and anemia. I do not suspect that her ROGERS is due to hypertensive emergency.   I suspect that Cr elevated after she started taking HCTZ and lisinopril a few months ago. Discussed this with patient and discussed options for management in the ED such as gentle fluid bolus but she stated that she had to  her children. She also has anemia which is new for her. She denies any bleeding other than her recent period. She has been told she has iron deficiency before, but does not take any medication. She denies any acute symptoms of anemia. I discussed that she needed to discuss both of these abnormal findings with her PCP. Discussed that with her BP being high she should likely take an additional agent. Suggested starting amlodipine, but patient wanted to discuss this with her cardiologist at followup tomorrow. Also discussed that with her peripheral edema and other metabolic syndrome components that HF is a possibility and she needs to discuss a possible echocardiogram with her cardiologist.  She may benefit from change in her diuretic medications. EKG nonischemic and trop negative - doubt ACS or heart function impacted by her HTN. Doubt VTE with no assymmetric component to swelling, no tachycardia, and no hypoxia. Hyperglycemic but no evidence of DKA. ED Course:   Initial assessment performed. The patients presenting problems have been discussed, and they are in agreement with the care plan formulated and outlined with them. I have encouraged them to ask questions as they arise throughout their visit. ED Course as of 12/09/22 0419   Thu Dec 08, 2022   1621 Discussed with the patient that she has a slight acute kidney injury. Plan to give small bolus of IV fluids, but she stated that she had to go  her daughter from  and wanted to leave. I think this is reasonable at this time. EKG nonischemic. Hemoglobin is dropped to 8.5 but she denies any bleeding other than recent heavy period that she just stopped.  [WB]      ED Course User Index  [WB] Sherie Alonso MD     Critical Care:  None      Disposition:  Home    DISCHARGE PLAN:  1. Discharge Medication List as of 12/8/2022  4:29 PM        2. Follow-up Information       Follow up With Specialties Details Why Contact Info    Claribel Andrade NP Nurse Practitioner Schedule an appointment as soon as possible for a visit   [address] [id number] [phone]      Deepali Bearden, [address] [facility] Vascular Surgery, Cardiovascular Disease Physician In 1 day  [place] [address] [phone]            3. Return to ED if worse     Diagnosis     Clinical Impression:   1. Hypertension, unspecified type    2. Anemia, unspecified type    3. Peripheral edema    4. Hyperglycemia      Attestations:    Kody Leung MD    Please note that this dictation was completed with [employer], the computer voice recognition software. Quite often unanticipated grammatical, syntax, homophones, and other interpretive errors are inadvertently transcribed by the computer software. Please disregard these errors. Please excuse any errors that have escaped final proofreading. Thank you.

## 2022-12-08 NOTE — DISCHARGE INSTRUCTIONS
Please make a followup with your primary care physician. If you have any new or worsening concerning medical symptoms please return to the emergency department. You have multiple items that she need to discuss with your primary care physician and Dr. Arianna Singh. Here noted to have a lower hemoglobin level which may have been due to your recent period. Please discuss this with your primary care doctor and OB/GYN. You likely have iron deficiency anemia as you state that she has a history of this, but this should be retested and he may require further work-up and medications. We noted here that your kidney function was slightly worse than it was before we started medications with lisinopril and hydrochlorothiazide 3 months ago. This may be due to the medications overall. Because your blood pressure is not under control totally it is probably worth adding an additional agent such as amlodipine. Your current medications may need to be adjusted with your kidney function. With your peripheral edema I am worried about the possibility of undiagnosed heart failure and you may need an echocardiogram for further evaluation. Additionally your blood sugar is high here and you likely need further control of your diabetes with additional agents. Please discuss all of this this with Dr. Arianna Singh.

## 2022-12-08 NOTE — ED NOTES
Pt confirmed cardiology appt tomorrow. Dr. Kizzy Arias aware of BP upon DC. DC papers reviewed and in hand, pt verbalized understanding. Ambulatory out of ER without assist, no acute distress noted.

## 2022-12-09 LAB
ATRIAL RATE: 97 BPM
CALCULATED P AXIS, ECG09: 64 DEGREES
CALCULATED R AXIS, ECG10: 5 DEGREES
CALCULATED T AXIS, ECG11: 96 DEGREES
DIAGNOSIS, 93000: NORMAL
P-R INTERVAL, ECG05: 134 MS
Q-T INTERVAL, ECG07: 340 MS
QRS DURATION, ECG06: 70 MS
QTC CALCULATION (BEZET), ECG08: 431 MS
VENTRICULAR RATE, ECG03: 97 BPM

## 2023-08-02 ENCOUNTER — NURSE TRIAGE (OUTPATIENT)
Dept: OTHER | Facility: CLINIC | Age: 38
End: 2023-08-02

## 2023-08-02 NOTE — TELEPHONE ENCOUNTER
[place]    [facility]; Patient with [employer] Complaint requesting to establish care with [facility]. Subjective: Caller states \"HTN for past year, cardiologist referred by last ED visit, started her on meds but wants her to establish care for management\"     Current Symptoms: /119, 2 hours ago, bilateral feet swelling x a couple of months, headache x 2 weeks    Onset: 2 months ago; gradual, worsening    Associated Symptoms: NA    Pain Severity: 6/10; throbbing; constant    Temperature: Denies     What has been tried: Exedrine, tylenol    LMP:  3 weeks ago  Pregnant: No    Recommended disposition: See PCP within 3 Days    Care advice provided, patient verbalizes understanding; denies any other questions or concerns; instructed to call back for any new or worsening symptoms. Patient/Caller agrees with recommended disposition; writer provided warm transfer to [facility] for [facility] scheduling    Attention Provider: Thank you for allowing me to participate in the care of your patient. The patient was connected to triage in response to information provided to the [facility]. Please do not respond through this encounter as the response is not directed to a shared pool.         Reason for Disposition   Systolic BP >= 351 OR Diastolic >= 249    Protocols used: Blood Pressure - High-ADULT-OH

## 2023-08-03 ENCOUNTER — APPOINTMENT (OUTPATIENT)
Facility: HOSPITAL | Age: 38
End: 2023-08-03
Payer: COMMERCIAL

## 2023-08-03 ENCOUNTER — HOSPITAL ENCOUNTER (EMERGENCY)
Facility: HOSPITAL | Age: 38
Discharge: HOME | End: 2023-08-03
Attending: EMERGENCY MEDICINE
Payer: COMMERCIAL

## 2023-08-03 VITALS
RESPIRATION RATE: 16 BRPM | DIASTOLIC BLOOD PRESSURE: 94 MMHG | TEMPERATURE: 98.2 F | WEIGHT: 240.74 LBS | OXYGEN SATURATION: 100 % | HEIGHT: 68 IN | BODY MASS INDEX: 36.49 KG/M2 | HEART RATE: 81 BPM | SYSTOLIC BLOOD PRESSURE: 156 MMHG

## 2023-08-03 DIAGNOSIS — R79.89 ELEVATED BRAIN NATRIURETIC PEPTIDE (BNP) LEVEL: ICD-10-CM

## 2023-08-03 DIAGNOSIS — N28.9 RENAL INSUFFICIENCY: ICD-10-CM

## 2023-08-03 DIAGNOSIS — I10 HYPERTENSION, UNSPECIFIED TYPE: Primary | ICD-10-CM

## 2023-08-03 LAB
ALBUMIN SERPL-MCNC: 2.6 G/DL (ref 3.5–5)
ALBUMIN/GLOB SERPL: 0.7 (ref 1.1–2.2)
ALP SERPL-CCNC: 105 U/L (ref 45–117)
ALT SERPL-CCNC: 20 U/L (ref 12–78)
ANION GAP SERPL CALC-SCNC: 3 MMOL/L (ref 5–15)
AST SERPL-CCNC: 14 U/L (ref 15–37)
BASOPHILS # BLD: 0 K/UL (ref 0–0.1)
BASOPHILS NFR BLD: 0 % (ref 0–1)
BILIRUB SERPL-MCNC: 0.2 MG/DL (ref 0.2–1)
BUN SERPL-MCNC: 22 MG/DL (ref 6–20)
BUN/CREAT SERPL: 10 (ref 12–20)
CA-I BLD-MCNC: 1.25 MMOL/L (ref 1.12–1.32)
CALCIUM SERPL-MCNC: 8.4 MG/DL (ref 8.5–10.1)
CHLORIDE BLD-SCNC: 107 MMOL/L (ref 98–107)
CHLORIDE SERPL-SCNC: 107 MMOL/L (ref 97–108)
CO2 SERPL-SCNC: 26 MMOL/L (ref 21–32)
CREAT BLD-MCNC: 2.24 MG/DL (ref 0.6–1.3)
CREAT SERPL-MCNC: 2.23 MG/DL (ref 0.55–1.02)
DIFFERENTIAL METHOD BLD: ABNORMAL
EOSINOPHIL # BLD: 0.1 K/UL (ref 0–0.4)
EOSINOPHIL NFR BLD: 1 % (ref 0–7)
ERYTHROCYTE [DISTWIDTH] IN BLOOD BY AUTOMATED COUNT: 11.9 % (ref 11.5–14.5)
GLOBULIN SER CALC-MCNC: 4 G/DL (ref 2–4)
GLUCOSE BLD-MCNC: 179 MG/DL (ref 65–100)
GLUCOSE SERPL-MCNC: 165 MG/DL (ref 65–100)
HCT VFR BLD AUTO: 25 % (ref 35–47)
HGB BLD-MCNC: 8.1 G/DL (ref 11.5–16)
IMM GRANULOCYTES # BLD AUTO: 0 K/UL (ref 0–0.04)
IMM GRANULOCYTES NFR BLD AUTO: 0 % (ref 0–0.5)
LYMPHOCYTES # BLD: 1.9 K/UL (ref 0.8–3.5)
LYMPHOCYTES NFR BLD: 22 % (ref 12–49)
MCH RBC QN AUTO: 28.2 PG (ref 26–34)
MCHC RBC AUTO-ENTMCNC: 32.4 G/DL (ref 30–36.5)
MCV RBC AUTO: 87.1 FL (ref 80–99)
MONOCYTES # BLD: 0.5 K/UL (ref 0–1)
MONOCYTES NFR BLD: 5 % (ref 5–13)
NEUTS SEG # BLD: 6.4 K/UL (ref 1.8–8)
NEUTS SEG NFR BLD: 72 % (ref 32–75)
NRBC # BLD: 0 K/UL (ref 0–0.01)
NRBC BLD-RTO: 0 PER 100 WBC
NT PRO BNP: 6362 PG/ML
PLATELET # BLD AUTO: 244 K/UL (ref 150–400)
PMV BLD AUTO: 9.7 FL (ref 8.9–12.9)
POTASSIUM BLD-SCNC: 4.4 MMOL/L (ref 3.5–5.1)
POTASSIUM SERPL-SCNC: 4 MMOL/L (ref 3.5–5.1)
PROT SERPL-MCNC: 6.6 G/DL (ref 6.4–8.2)
RBC # BLD AUTO: 2.87 M/UL (ref 3.8–5.2)
SERVICE CMNT-IMP: ABNORMAL
SODIUM BLD-SCNC: 139 MMOL/L (ref 136–145)
SODIUM SERPL-SCNC: 136 MMOL/L (ref 136–145)
TROPONIN I SERPL HS-MCNC: 16 NG/L (ref 0–51)
TROPONIN I SERPL HS-MCNC: 16 NG/L (ref 0–51)
WBC # BLD AUTO: 8.9 K/UL (ref 3.6–11)

## 2023-08-03 PROCEDURE — 36415 COLL VENOUS BLD VENIPUNCTURE: CPT

## 2023-08-03 PROCEDURE — 99284 EMERGENCY DEPT VISIT MOD MDM: CPT

## 2023-08-03 PROCEDURE — 80053 COMPREHEN METABOLIC PANEL: CPT

## 2023-08-03 PROCEDURE — 76770 US EXAM ABDO BACK WALL COMP: CPT

## 2023-08-03 PROCEDURE — 84484 ASSAY OF TROPONIN QUANT: CPT

## 2023-08-03 PROCEDURE — 2500000003 HC RX 250 WO HCPCS: Performed by: EMERGENCY MEDICINE

## 2023-08-03 PROCEDURE — 6370000000 HC RX 637 (ALT 250 FOR IP): Performed by: EMERGENCY MEDICINE

## 2023-08-03 PROCEDURE — 93005 ELECTROCARDIOGRAM TRACING: CPT | Performed by: EMERGENCY MEDICINE

## 2023-08-03 PROCEDURE — 83880 ASSAY OF NATRIURETIC PEPTIDE: CPT

## 2023-08-03 PROCEDURE — 70450 CT HEAD/BRAIN W/O DYE: CPT

## 2023-08-03 PROCEDURE — 96374 THER/PROPH/DIAG INJ IV PUSH: CPT

## 2023-08-03 PROCEDURE — 6370000000 HC RX 637 (ALT 250 FOR IP): Performed by: PHYSICIAN ASSISTANT

## 2023-08-03 PROCEDURE — 85025 COMPLETE CBC W/AUTO DIFF WBC: CPT

## 2023-08-03 PROCEDURE — 80047 BASIC METABLC PNL IONIZED CA: CPT

## 2023-08-03 RX ORDER — CARVEDILOL 12.5 MG/1
12.5 TABLET ORAL
Status: COMPLETED | OUTPATIENT
Start: 2023-08-03 | End: 2023-08-03

## 2023-08-03 RX ORDER — BUMETANIDE 1 MG/1
1 TABLET ORAL EVERY MORNING
COMMUNITY
Start: 2023-05-22

## 2023-08-03 RX ORDER — CARVEDILOL 12.5 MG/1
12.5 TABLET ORAL 2 TIMES DAILY WITH MEALS
Qty: 60 TABLET | Refills: 3 | Status: SHIPPED | OUTPATIENT
Start: 2023-08-03

## 2023-08-03 RX ORDER — BUMETANIDE 0.25 MG/ML
1 INJECTION INTRAMUSCULAR; INTRAVENOUS
Status: COMPLETED | OUTPATIENT
Start: 2023-08-03 | End: 2023-08-03

## 2023-08-03 RX ORDER — BUMETANIDE 1 MG/1
1 TABLET ORAL DAILY
Status: DISCONTINUED | OUTPATIENT
Start: 2023-08-03 | End: 2023-08-03

## 2023-08-03 RX ORDER — CLONIDINE HYDROCHLORIDE 0.1 MG/1
0.1 TABLET ORAL
Status: COMPLETED | OUTPATIENT
Start: 2023-08-03 | End: 2023-08-03

## 2023-08-03 RX ORDER — VALSARTAN 40 MG/1
TABLET ORAL
COMMUNITY
Start: 2023-08-02 | End: 2023-08-03

## 2023-08-03 RX ORDER — BUMETANIDE 1 MG/1
1 TABLET ORAL
Status: DISCONTINUED | OUTPATIENT
Start: 2023-08-03 | End: 2023-08-03

## 2023-08-03 RX ORDER — ACETAMINOPHEN 325 MG/1
650 TABLET ORAL
Status: COMPLETED | OUTPATIENT
Start: 2023-08-03 | End: 2023-08-03

## 2023-08-03 RX ADMIN — CARVEDILOL 12.5 MG: 12.5 TABLET, FILM COATED ORAL at 11:37

## 2023-08-03 RX ADMIN — BUMETANIDE 1 MG: 0.25 INJECTION INTRAMUSCULAR; INTRAVENOUS at 11:05

## 2023-08-03 RX ADMIN — CLONIDINE HYDROCHLORIDE 0.1 MG: 0.1 TABLET ORAL at 07:31

## 2023-08-03 RX ADMIN — ACETAMINOPHEN 650 MG: 325 TABLET ORAL at 07:31

## 2023-08-03 ASSESSMENT — ENCOUNTER SYMPTOMS
ABDOMINAL PAIN: 0
COUGH: 0
VOMITING: 0
COLOR CHANGE: 0
DIARRHEA: 0

## 2023-08-03 ASSESSMENT — HEART SCORE: ECG: 0

## 2023-08-03 ASSESSMENT — PAIN SCALES - GENERAL
PAINLEVEL_OUTOF10: 6
PAINLEVEL_OUTOF10: 6

## 2023-08-03 ASSESSMENT — PAIN DESCRIPTION - LOCATION: LOCATION: HEAD

## 2023-08-03 NOTE — ED PROVIDER NOTES
[facility] EMERGENCY DEPT  EMERGENCY DEPARTMENT ENCOUNTER       Pt Name: Shahnaz Silver  MRN: [id number]  [address] [facility] [place] 1985  Date of evaluation: 8/3/2023  Provider: Olimpia Edmondson MD   PCP: ALMAS Ortega NP  Note Started: 2:06 PM 8/3/23     CHIEF COMPLAINT       Chief Complaint   Patient presents with    Hypertension     Pt arrives ambulatory to triage, reports HTN and HA x 2 days. Pt takes Valsartan for BP control at home. HISTORY OF PRESENT ILLNESS: 1 or more elements      History From: Patient     Shahnaz Silver is a 34 y.o. female who presents ambulatory to the ER for elevated blood pressure. Patient explains that for the past 2 weeks has had a pretty frequent headache. He does not check her blood pressure every day but she checks it randomly. Started checking it yesterday at work [phone] and it was 153 systolic. Called her cardiologist Quinn Daniel in the afternoon who recommended she double her valsartan to 80 mg. She took 80 mg but her blood pressure remained elevated last night and this morning. She normally takes her medication at night because she does not like how she feels during the day when she takes her medicines. She admits she is not compliant with her Bumex because she has not seen much difference with it so she does not take it every day. She notes that today she is feeling pressure across her chest which is not associated with shortness of breath, nausea or vomiting. Nursing Notes were all reviewed and agreed with or any disagreements were addressed in the HPI. REVIEW OF SYSTEMS      Review of Systems   Constitutional:  Negative for activity change, appetite change, chills, fatigue and fever. HENT:  Negative for congestion. Eyes:  Negative for visual disturbance. Respiratory:  Negative for cough. Cardiovascular:  Positive for chest pain and leg swelling. Gastrointestinal:  Negative for abdominal pain, diarrhea and vomiting.    Genitourinary:  Negative for

## 2023-08-03 NOTE — ED NOTES
I have reviewed discharge instructions with the patient at this time. The Patient verbalized understanding and denies any further questions. Patient has been made aware of prescription(s) called into pharmacy for . Patient ambulatory out to waiting room at this time.         Jenifer Lawson RN  08/03/23 0604

## 2023-08-03 NOTE — ED PROVIDER NOTES
[facility] EMERGENCY DEPT  EMERGENCY DEPARTMENT ENCOUNTER       Pt Name: India Vargas  MRN: [id number]  [address] [facility] [place] 1985  Date of evaluation: 8/3/2023  Provider: SHAE Freeman   PCP: ALMAS Goodman NP  Note Started: 7:22 AM EDT 8/3/23     CHIEF COMPLAINT       Chief Complaint   Patient presents with    Hypertension     Pt arrives ambulatory to triage, reports HTN and HA x 2 days. Pt takes Valsartan for BP control at home. HISTORY OF PRESENT ILLNESS: 1 or more elements      History From: Patient  HPI Limitations: None     India Vargas is a 34 y.o. female who presents by POV with concerns of an elevated blood pressure. She reports a history of hypertension. About 3 months ago her primary care doctor changed her from lisinopril to valsartan at the patient's request.  She notes that her blood pressure has been increasingly elevating since this time. She missed her last appointment with primary care medicine for adjustment of the medications and reports that she is switching to a new primary care doctor. She has an appointment with Dr. Lr later this month. She is also followed by Dr. Gurpreet Scott. She called him yesterday and he advised her to take 2 of her valsartan. This did not help. She woke this morning with a headache, which is what prompted her to come to the ED. She has not taken any of her medications today. She also reports that she is supposed to be taking Bumex but has not taken it in over a month. There is no lightheadedness, dizziness, weakness, chest pain, palpitations, shortness of breath, abdominal complaints. Nursing Notes were all reviewed and agreed with or any disagreements were addressed in the HPI. REVIEW OF SYSTEMS      Review of Systems     Positives and Pertinent negatives as per HPI.     PAST HISTORY     Past Medical History:  Past Medical History:   Diagnosis Date    Abnormal Pap smear     abnormal pap at 15 y/o normal since     Back pain 2015 feb    [facility]

## 2023-08-04 LAB
EKG ATRIAL RATE: 96 BPM
EKG DIAGNOSIS: NORMAL
EKG P AXIS: 73 DEGREES
EKG P-R INTERVAL: 140 MS
EKG Q-T INTERVAL: 336 MS
EKG QRS DURATION: 78 MS
EKG QTC CALCULATION (BAZETT): 424 MS
EKG R AXIS: 20 DEGREES
EKG T AXIS: 100 DEGREES
EKG VENTRICULAR RATE: 96 BPM

## 2023-08-22 ENCOUNTER — OFFICE VISIT (OUTPATIENT)
Age: 38
End: 2023-08-22
Payer: COMMERCIAL

## 2023-08-22 VITALS
BODY MASS INDEX: 39.42 KG/M2 | HEART RATE: 83 BPM | SYSTOLIC BLOOD PRESSURE: 163 MMHG | OXYGEN SATURATION: 98 % | DIASTOLIC BLOOD PRESSURE: 106 MMHG | WEIGHT: 236.6 LBS | TEMPERATURE: 98 F | HEIGHT: 65 IN

## 2023-08-22 DIAGNOSIS — E11.9 TYPE 2 DIABETES MELLITUS WITHOUT COMPLICATION, WITHOUT LONG-TERM CURRENT USE OF INSULIN (HCC): Primary | ICD-10-CM

## 2023-08-22 DIAGNOSIS — I10 PRIMARY HYPERTENSION: ICD-10-CM

## 2023-08-22 DIAGNOSIS — R79.89 ELEVATED BRAIN NATRIURETIC PEPTIDE (BNP) LEVEL: ICD-10-CM

## 2023-08-22 DIAGNOSIS — E53.8 B12 DEFICIENCY: ICD-10-CM

## 2023-08-22 DIAGNOSIS — E55.9 VITAMIN D DEFICIENCY: ICD-10-CM

## 2023-08-22 DIAGNOSIS — R60.9 EDEMA, UNSPECIFIED TYPE: ICD-10-CM

## 2023-08-22 DIAGNOSIS — D64.9 ANEMIA, UNSPECIFIED TYPE: ICD-10-CM

## 2023-08-22 DIAGNOSIS — N18.9 CHRONIC KIDNEY DISEASE, UNSPECIFIED CKD STAGE: ICD-10-CM

## 2023-08-22 DIAGNOSIS — E61.1 IRON DEFICIENCY: ICD-10-CM

## 2023-08-22 PROBLEM — R03.0 ELEVATED BLOOD PRESSURE READING WITHOUT DIAGNOSIS OF HYPERTENSION: Status: ACTIVE | Noted: 2021-12-07

## 2023-08-22 PROBLEM — F43.21 ADJUSTMENT DISORDER WITH DEPRESSED MOOD: Status: ACTIVE | Noted: 2020-05-13

## 2023-08-22 PROBLEM — K64.4 EXTERNAL HEMORRHOID, BLEEDING: Status: ACTIVE | Noted: 2022-06-09

## 2023-08-22 PROBLEM — M51.369 LUMBAR DISC NARROWING: Status: ACTIVE | Noted: 2017-08-14

## 2023-08-22 PROBLEM — M51.36 LUMBAR DISC NARROWING: Status: ACTIVE | Noted: 2017-08-14

## 2023-08-22 PROBLEM — M54.31 RIGHT SIDED SCIATICA: Status: ACTIVE | Noted: 2017-08-14

## 2023-08-22 PROBLEM — D64.89 OTHER SPECIFIED ANEMIAS: Status: ACTIVE | Noted: 2022-07-28

## 2023-08-22 PROBLEM — G89.29 CHRONIC BILATERAL LOW BACK PAIN WITH RIGHT-SIDED SCIATICA: Status: ACTIVE | Noted: 2017-08-14

## 2023-08-22 PROBLEM — M72.2 PLANTAR FASCIITIS: Status: ACTIVE | Noted: 2022-06-23

## 2023-08-22 PROBLEM — M51.26 LUMBAR DISC HERNIATION: Status: ACTIVE | Noted: 2017-08-14

## 2023-08-22 PROBLEM — K64.5 HEMORRHOIDS, THROMBOSED: Status: ACTIVE | Noted: 2020-05-13

## 2023-08-22 PROBLEM — M54.41 CHRONIC BILATERAL LOW BACK PAIN WITH RIGHT-SIDED SCIATICA: Status: ACTIVE | Noted: 2017-08-14

## 2023-08-22 PROCEDURE — 3077F SYST BP >= 140 MM HG: CPT | Performed by: FAMILY MEDICINE

## 2023-08-22 PROCEDURE — 99204 OFFICE O/P NEW MOD 45 MIN: CPT | Performed by: FAMILY MEDICINE

## 2023-08-22 PROCEDURE — 3080F DIAST BP >= 90 MM HG: CPT | Performed by: FAMILY MEDICINE

## 2023-08-22 SDOH — ECONOMIC STABILITY: FOOD INSECURITY: WITHIN THE PAST 12 MONTHS, THE FOOD YOU BOUGHT JUST DIDN'T LAST AND YOU DIDN'T HAVE MONEY TO GET MORE.: NEVER TRUE

## 2023-08-22 SDOH — ECONOMIC STABILITY: INCOME INSECURITY: HOW HARD IS IT FOR YOU TO PAY FOR THE VERY BASICS LIKE FOOD, HOUSING, MEDICAL CARE, AND HEATING?: NOT HARD AT ALL

## 2023-08-22 SDOH — ECONOMIC STABILITY: HOUSING INSECURITY
IN THE LAST 12 MONTHS, WAS THERE A TIME WHEN YOU DID NOT HAVE A STEADY PLACE TO SLEEP OR SLEPT IN A SHELTER (INCLUDING NOW)?: NO

## 2023-08-22 SDOH — ECONOMIC STABILITY: FOOD INSECURITY: WITHIN THE PAST 12 MONTHS, YOU WORRIED THAT YOUR FOOD WOULD RUN OUT BEFORE YOU GOT MONEY TO BUY MORE.: NEVER TRUE

## 2023-08-22 ASSESSMENT — PATIENT HEALTH QUESTIONNAIRE - PHQ9
2. FEELING DOWN, DEPRESSED OR HOPELESS: 0
SUM OF ALL RESPONSES TO PHQ QUESTIONS 1-9: 0
1. LITTLE INTEREST OR PLEASURE IN DOING THINGS: 0
SUM OF ALL RESPONSES TO PHQ QUESTIONS 1-9: 0
SUM OF ALL RESPONSES TO PHQ9 QUESTIONS 1 & 2: 0

## 2023-08-22 NOTE — PROGRESS NOTES
Chief Complaint   Patient presents with    [facility]         Health Maintenance Due   Topic Date Due    COVID-19 Vaccine (1) Never done    Varicella vaccine (1 of 2 - 2-dose childhood series) Never done    Pneumococcal 0-64 years Vaccine (1 - PCV) Never done    Diabetic foot exam  Never done    A1C test (Diabetic or Prediabetic)  Never done    Lipids  Never done    Diabetic Alb to Cr ratio (uACR) test  Never done    Diabetic retinal exam  Never done    Hepatitis C screen  Never done    Hepatitis B vaccine (1 of 3 - Risk 3-dose series) Never done    DTaP/Tdap/Td vaccine (1 - Tdap) Never done    Cervical cancer screen  Never done    Flu vaccine (1) 08/01/2023           1. \"Have you been to the ER, urgent care clinic since your last visit? Hospitalized since your last visit? \" No    2. \"Have you seen or consulted any other health care providers outside of the [address] [place] [address] since your last visit? \" No     3. For patients aged 43-73: Has the patient had a colonoscopy / FIT/ Cologuard? NA - based on age      If the patient is female:    4. For patients aged 43-66: Has the patient had a mammogram within the past 2 years? No      5. For patients aged 21-65: Has the patient had a pap smear?  No

## 2023-08-22 NOTE — PROGRESS NOTES
HPI  Berenice Cagle is a 34 y.o. female who Presents to [facility] care. Have a recent hospital encounter with the emergency room 8/3 for elevated  systolic. Apparently she has been having a a lot of trouble with her blood pressure and lower extremity edema. Reports feeling shortness of breath for about 8 months, cannot lie flat for about 8 months. Swelling for over a year. Edema can be to the mid thigh to the point where she cannot bend her knees at by midday    As part of the hospital encounter consulting cardiologist recommended referral to nephrology for difficult to control blood pressure. Was given Coreg. Reviewed labs. Creatinine 2.23 up from 1.75 in December 2022Up from 0.8 in 2019  proBNP 6000  Troponin negative  Hemoglobin 8.1 stable from 2022-8.5 down from 14 in 2019    Has  diabetes, I do not have a recent A1c on her. Is prescribed metformin 500 mg which she takes at night when she remembers but only remembers for about a month at a time. Wakes feeling queasy. Does not like this medicine      PMHx:  Past Medical History:   Diagnosis Date    Abnormal Pap smear     abnormal pap at 15 y/o normal since     Adjustment disorder with depressed mood 5/13/2020    Back pain 2015 feb    [facility]    Diabetes [facility])     Diabetes mellitus ([address])     Gestational Diabetes with last two pregnancies-diet controlled       Meds:   Current Outpatient Medications   Medication Sig Dispense Refill    bumetanide (BUMEX) 1 MG tablet Take 1 tablet by mouth every morning      carvedilol (COREG) 12.5 MG tablet Take 1 tablet by mouth 2 times daily (with meals) 60 tablet 3    metFORMIN (GLUCOPHAGE) 1000 MG tablet Take 1 tablet by mouth 2 times daily (with meals)       No current facility-administered medications for this visit. Allergies:    Allergies   Allergen Reactions    Tramadol Hives    Amoxicillin Rash       Smoker:  Social History     Tobacco Use   Smoking Status Never   Smokeless

## 2023-08-23 ENCOUNTER — TELEPHONE (OUTPATIENT)
Age: 38
End: 2023-08-23

## 2023-08-23 DIAGNOSIS — E11.9 TYPE 2 DIABETES MELLITUS WITHOUT COMPLICATION, WITHOUT LONG-TERM CURRENT USE OF INSULIN (HCC): Primary | ICD-10-CM

## 2023-08-23 DIAGNOSIS — E55.9 VITAMIN D DEFICIENCY: ICD-10-CM

## 2023-08-23 LAB
25(OH)D3+25(OH)D2 SERPL-MCNC: 7.9 NG/ML (ref 30–100)
ALBUMIN SERPL-MCNC: 3.3 G/DL (ref 3.9–4.9)
ALBUMIN/GLOB SERPL: 1.3 {RATIO} (ref 1.2–2.2)
ALP SERPL-CCNC: 89 IU/L (ref 44–121)
ALT SERPL-CCNC: 10 IU/L (ref 0–32)
AST SERPL-CCNC: 13 IU/L (ref 0–40)
BASOPHILS # BLD AUTO: 0 X10E3/UL (ref 0–0.2)
BASOPHILS NFR BLD AUTO: 0 %
BILIRUB SERPL-MCNC: <0.2 MG/DL (ref 0–1.2)
BUN SERPL-MCNC: 24 MG/DL (ref 6–24)
BUN/CREAT SERPL: 11 (ref 9–23)
CALCIUM SERPL-MCNC: 8.5 MG/DL (ref 8.7–10.2)
CHLORIDE SERPL-SCNC: 103 MMOL/L (ref 96–106)
CHOLEST SERPL-MCNC: 207 MG/DL (ref 100–199)
CO2 SERPL-SCNC: 21 MMOL/L (ref 20–29)
CREAT SERPL-MCNC: 2.11 MG/DL (ref 0.57–1)
EGFRCR SERPLBLD CKD-EPI 2021: 30 ML/MIN/1.73
EOSINOPHIL # BLD AUTO: 0.1 X10E3/UL (ref 0–0.4)
EOSINOPHIL NFR BLD AUTO: 1 %
ERYTHROCYTE [DISTWIDTH] IN BLOOD BY AUTOMATED COUNT: 12.1 % (ref 11.7–15.4)
FERRITIN SERPL-MCNC: 48 NG/ML (ref 15–150)
FOLATE SERPL-MCNC: 5.3 NG/ML
GLOBULIN SER CALC-MCNC: 2.5 G/DL (ref 1.5–4.5)
GLUCOSE SERPL-MCNC: 188 MG/DL (ref 70–99)
HBA1C MFR BLD: 8.4 % (ref 4.8–5.6)
HCT VFR BLD AUTO: 22.9 % (ref 34–46.6)
HDLC SERPL-MCNC: 55 MG/DL
HGB BLD-MCNC: 7.4 G/DL (ref 11.1–15.9)
IMM GRANULOCYTES # BLD AUTO: 0 X10E3/UL (ref 0–0.1)
IMM GRANULOCYTES NFR BLD AUTO: 0 %
IMP & REVIEW OF LAB RESULTS: NORMAL
IRON SATN MFR SERPL: 15 % (ref 15–55)
IRON SERPL-MCNC: 41 UG/DL (ref 27–159)
LDLC SERPL CALC-MCNC: 135 MG/DL (ref 0–99)
LYMPHOCYTES # BLD AUTO: 1.9 X10E3/UL (ref 0.7–3.1)
LYMPHOCYTES NFR BLD AUTO: 24 %
MCH RBC QN AUTO: 27.7 PG (ref 26.6–33)
MCHC RBC AUTO-ENTMCNC: 32.3 G/DL (ref 31.5–35.7)
MCV RBC AUTO: 86 FL (ref 79–97)
MONOCYTES # BLD AUTO: 0.4 X10E3/UL (ref 0.1–0.9)
MONOCYTES NFR BLD AUTO: 5 %
NEUTROPHILS # BLD AUTO: 5.5 X10E3/UL (ref 1.4–7)
NEUTROPHILS NFR BLD AUTO: 70 %
PLATELET # BLD AUTO: 234 X10E3/UL (ref 150–450)
POTASSIUM SERPL-SCNC: 4.6 MMOL/L (ref 3.5–5.2)
PROT SERPL-MCNC: 5.8 G/DL (ref 6–8.5)
RBC # BLD AUTO: 2.67 X10E6/UL (ref 3.77–5.28)
REPORT: NORMAL
SODIUM SERPL-SCNC: 137 MMOL/L (ref 134–144)
TIBC SERPL-MCNC: 280 UG/DL (ref 250–450)
TRIGL SERPL-MCNC: 97 MG/DL (ref 0–149)
TSH SERPL DL<=0.005 MIU/L-ACNC: 0.78 UIU/ML (ref 0.45–4.5)
UIBC SERPL-MCNC: 239 UG/DL (ref 131–425)
VIT B12 SERPL-MCNC: 299 PG/ML (ref 232–1245)
VLDLC SERPL CALC-MCNC: 17 MG/DL (ref 5–40)
WBC # BLD AUTO: 7.9 X10E3/UL (ref 3.4–10.8)

## 2023-08-23 RX ORDER — ERGOCALCIFEROL 1.25 MG/1
50000 CAPSULE ORAL WEEKLY
Qty: 12 CAPSULE | Refills: 0 | Status: SHIPPED | OUTPATIENT
Start: 2023-08-23

## 2023-08-23 RX ORDER — GLIPIZIDE 5 MG/1
5 TABLET ORAL 2 TIMES DAILY
Qty: 180 TABLET | Refills: 3 | Status: SHIPPED | OUTPATIENT
Start: 2023-08-23

## 2023-08-23 NOTE — TELEPHONE ENCOUNTER
Kidney function is about the same as it was in the emergency room. You appear to have chronic kidney disease stage 3 or 4. You need to see a nephrologist.    You are anemic. It is likely that this is related to your kidney function. Your kidneys are responsible for stimulating blood cell production. Your nephrologist may be able to help you with this    Anemia at this level could be responsible for shortness of breath. You are profoundly low on vitamin D.  Vitamin D deficiency symptoms are pretty subtle but could be fatigue. We have sent prescription vitamin D    Your A1c is 8.4%. You do need a medication for diabetes. I will call in glipizide.   Do not take your metformin

## 2023-08-25 ENCOUNTER — TELEPHONE (OUTPATIENT)
Age: 38
End: 2023-08-25

## 2023-08-25 DIAGNOSIS — E11.9 TYPE 2 DIABETES MELLITUS WITHOUT COMPLICATION, WITHOUT LONG-TERM CURRENT USE OF INSULIN (HCC): Primary | ICD-10-CM

## 2023-08-25 RX ORDER — LANCETS 30 GAUGE
EACH MISCELLANEOUS
Qty: 100 EACH | Refills: 3 | Status: SHIPPED | OUTPATIENT
Start: 2023-08-25

## 2023-08-25 RX ORDER — GLUCOSAMINE HCL/CHONDROITIN SU 500-400 MG
CAPSULE ORAL
Qty: 100 STRIP | Refills: 3 | Status: SHIPPED | OUTPATIENT
Start: 2023-08-25

## 2023-08-25 NOTE — TELEPHONE ENCOUNTER
Prescribed a glucometer. Glipizide has the potential to lower blood sugar. When taken at the right dose it will lower it just enough to bring her sugar into a safe level. If taking it too high of a dose it can lower the blood sugar to an uncomfortably low level. We started a low-dose of glipizide and gradually work our way up to prevent the hypoglycemia. She does however need to remain vigilant for hypoglycemia. She does need a medication.   We can try a different medication if she is not comfortable with glipizide

## 2023-08-25 NOTE — TELEPHONE ENCOUNTER
Pt calling, Pt states that she is not sure about taking the new medication that Dr. Dev Almanzar prescribed her due to the information that the pharmacist gave her las night and the side effects that could possibly happen with her blood sugars dropping; pt is looking for ez advice, any pt is inquiring about a monitor to monitor her BLOOD sugars; please call pt back as she is concerned about the medication      Thank You

## 2023-08-28 ENCOUNTER — TELEPHONE (OUTPATIENT)
Age: 38
End: 2023-08-28

## 2023-08-28 DIAGNOSIS — E11.9 TYPE 2 DIABETES MELLITUS WITHOUT COMPLICATION, WITHOUT LONG-TERM CURRENT USE OF INSULIN (HCC): ICD-10-CM

## 2023-08-28 NOTE — TELEPHONE ENCOUNTER
----- Message from Rigoberto Gamino sent at 8/28/2023  1:37 PM EDT -----  Subject: Medication Problem    Medication: blood glucose monitor kit and supplies  Dosage: Glucose monitor   Ordering Provider: Delphine Vasquez     Question/Problem: Pt states [facility] was supposed to be sending a fax over for   the monitor that was requested for her. this monitor is not covered under   her insurance and needs a new order placed that will be covered       Pharmacy: [facility]/PHARMACY #[id number]- [id number] [phone] Madelyn Laws [phone]    ---------------------------------------------------------------------------  --------------  Victorino Guilford Dawna  [phone]; OK to leave message on voicemail  ---------------------------------------------------------------------------  --------------    SCRIPT ANSWERS  Relationship to Patient: Self

## 2023-09-05 RX ORDER — GLUCOSAMINE HCL/CHONDROITIN SU 500-400 MG
CAPSULE ORAL
Qty: 100 STRIP | Refills: 3 | Status: SHIPPED | OUTPATIENT
Start: 2023-09-05

## 2023-09-05 RX ORDER — LANCETS 30 GAUGE
EACH MISCELLANEOUS
Qty: 100 EACH | Refills: 3 | Status: SHIPPED | OUTPATIENT
Start: 2023-09-05

## 2023-09-05 NOTE — TELEPHONE ENCOUNTER
I had sent a generic prescription for any glucose monitor. I have not received a fax from [facility] requesting anything more specific.   Resent the generic prescription

## 2024-02-22 ENCOUNTER — TELEPHONE (OUTPATIENT)
Age: 39
End: 2024-02-22

## 2024-02-22 NOTE — TELEPHONE ENCOUNTER
Pt is calling stating she had an allergic reaction to med she was given 3 weeks ago don't look like nothing from us. She states she has a rash over her face and she is needing to get a referral to a Dermatologist I told her to call back tomorrow to see if we had a cancellation or to at least try to get in Monday Just FYI

## 2024-02-22 NOTE — TELEPHONE ENCOUNTER
verified. Pt states about 3 weeks ago she was prescribed a z-pack. Pt had taken 3 out of the 5 doses but stopped due to she started breaking out on her face/rash was itchy. Pt states she is still breaking out on face and has black spots with clear drainage and pt also states her skin is splitting/dry near her earlobes. Pt states this has been going on since she had taken the antibiotic 3 weeks ago. Pt denies any swelling or any breathing issues since. Pt has been using warm water and Vaseline on face, pt has not been using any face washes or creams because she does not want to make it worse.  Pt had not tried benadryl yet but is going to get some this evening to try and see if the benadryl will help. Also sent link for patient to set up my chart to be able to send picture to provider of rash. Pt is wondering what provider suggests would help with rash? Pt is also going to try to call back next week to schedule appt. Also updated patients allergies.

## 2024-02-23 NOTE — TELEPHONE ENCOUNTER
As described this sounds like a pretty significant reaction, more than a simple allergy.  Needs an exam.  Welcome to try a dermatologist but it is unlikely that she will find an appointment anytime soon    If we have nothing available direct her to urgent care.  Without seeing exactly what is going on I have nothing to suggest

## 2024-02-25 VITALS
TEMPERATURE: 99 F | SYSTOLIC BLOOD PRESSURE: 171 MMHG | HEIGHT: 65 IN | HEART RATE: 102 BPM | WEIGHT: 234.35 LBS | BODY MASS INDEX: 39.04 KG/M2 | DIASTOLIC BLOOD PRESSURE: 98 MMHG | OXYGEN SATURATION: 100 % | RESPIRATION RATE: 16 BRPM

## 2024-02-25 PROCEDURE — 99283 EMERGENCY DEPT VISIT LOW MDM: CPT

## 2024-02-25 ASSESSMENT — PAIN DESCRIPTION - DESCRIPTORS: DESCRIPTORS: BURNING

## 2024-02-25 ASSESSMENT — PAIN SCALES - GENERAL: PAINLEVEL_OUTOF10: 7

## 2024-02-25 ASSESSMENT — PAIN - FUNCTIONAL ASSESSMENT: PAIN_FUNCTIONAL_ASSESSMENT: 0-10

## 2024-02-25 ASSESSMENT — PAIN DESCRIPTION - LOCATION: LOCATION: FACE

## 2024-02-26 ENCOUNTER — HOSPITAL ENCOUNTER (EMERGENCY)
Facility: HOSPITAL | Age: 39
Discharge: HOME | End: 2024-02-26
Attending: EMERGENCY MEDICINE
Payer: MEDICAID

## 2024-02-26 DIAGNOSIS — R21 RASH AND OTHER NONSPECIFIC SKIN ERUPTION: Primary | ICD-10-CM

## 2024-02-26 RX ORDER — HYDROXYZINE PAMOATE 25 MG/1
25 CAPSULE ORAL 3 TIMES DAILY PRN
Qty: 20 CAPSULE | Refills: 0 | Status: SHIPPED | OUTPATIENT
Start: 2024-02-26 | End: 2024-03-11

## 2024-02-26 RX ORDER — CLOBETASOL PROPIONATE 0.5 MG/G
OINTMENT TOPICAL
Qty: 100 G | Refills: 0 | Status: SHIPPED | OUTPATIENT
Start: 2024-02-26

## 2024-02-26 RX ORDER — FERROUS SULFATE 325(65) MG
325 TABLET ORAL
COMMUNITY

## 2024-02-26 RX ORDER — LANOLIN ALCOHOL/MO/W.PET/CERES
400 CREAM (GRAM) TOPICAL DAILY
COMMUNITY

## 2024-02-26 RX ORDER — VALSARTAN 40 MG/1
40 TABLET ORAL DAILY
COMMUNITY

## 2024-02-26 RX ORDER — METOLAZONE 2.5 MG/1
2.5 TABLET ORAL DAILY
COMMUNITY

## 2024-02-26 RX ORDER — CEPHALEXIN 500 MG/1
500 CAPSULE ORAL 2 TIMES DAILY
Qty: 14 CAPSULE | Refills: 0 | Status: SHIPPED | OUTPATIENT
Start: 2024-02-26 | End: 2024-03-04

## 2024-02-26 ASSESSMENT — LIFESTYLE VARIABLES
HOW OFTEN DO YOU HAVE A DRINK CONTAINING ALCOHOL: NEVER
HOW MANY STANDARD DRINKS CONTAINING ALCOHOL DO YOU HAVE ON A TYPICAL DAY: PATIENT DOES NOT DRINK

## 2024-02-26 NOTE — ED NOTES
Patient discharged from the ED by Dr Black. Diagnosis, medications, precautions and follow-ups were reviewed with the patient/family. Questions were asked and answered prior to departure. Patient departed the ED via ambulatory and was accompanied by Son.

## 2024-02-26 NOTE — ED PROVIDER NOTES
[facility]ospitals EMERGENCY DEPT  EMERGENCY DEPARTMENT HISTORY AND PHYSICAL EXAM      Date: 2024  Patient Name: Rochelle José  MRN: [id number]  Birthdate 1985  Date of evaluation: 2024  Provider: Romario Black MD   Note Started: 1:30 AM EST 24    HISTORY OF PRESENT ILLNESS     Chief Complaint   Patient presents with    Rash     Pt arrived to ED from home with rash x 1 week to face. Pt seen at [facility].        History Provided By: Patient    HPI: Rochelle José is a 38 y.o. female With a 1 week history of rash on her face.  She says her face feels tight and stretched at times.  No exacerbating or relieving factors and the only new medication is new magnesium that she started.  She denies any fever, chills, nausea, vomiting, chest pain, shortness of breath, or headache.    PAST MEDICAL HISTORY   Past Medical History:  Past Medical History:   Diagnosis Date    Abnormal Pap smear     abnormal pap at 14 y/o normal since     Adjustment disorder with depressed mood 2020    Back pain 2015    [facility]  emergency [facility]    Diabetes (HCC)     Diabetes mellitus (HCC)     Gestational Diabetes with last two pregnancies-diet controlled       Past Surgical History:  Past Surgical History:   Procedure Laterality Date     SECTION      CHOLECYSTECTOMY      OTHER SURGICAL HISTORY      cholecystectomy        Family History:  Family History   Problem Relation Age of Onset    Stroke Father     Kidney Disease Father     Diabetes Father     Hypertension Father        Social History:  Social History     Tobacco Use    Smoking status: Never    Smokeless tobacco: Never   Substance Use Topics    Alcohol use: Yes    Drug use: No       Allergies:  Allergies   Allergen Reactions    Tramadol Hives    Amoxicillin Rash    Azithromycin Hives and Rash       PCP: Adan Fraga MD    Current Meds:   No current facility-administered medications for this encounter.     Current Outpatient Medications   Medication Sig Dispense

## 2024-02-27 ENCOUNTER — OFFICE VISIT (OUTPATIENT)
Age: 39
End: 2024-02-27
Payer: MEDICAID

## 2024-02-27 ENCOUNTER — TELEPHONE (OUTPATIENT)
Age: 39
End: 2024-02-27

## 2024-02-27 VITALS
DIASTOLIC BLOOD PRESSURE: 87 MMHG | TEMPERATURE: 98.1 F | WEIGHT: 237.8 LBS | OXYGEN SATURATION: 98 % | RESPIRATION RATE: 18 BRPM | HEART RATE: 88 BPM | BODY MASS INDEX: 39.62 KG/M2 | SYSTOLIC BLOOD PRESSURE: 136 MMHG | HEIGHT: 65 IN

## 2024-02-27 DIAGNOSIS — N18.4 CKD (CHRONIC KIDNEY DISEASE) STAGE 4, GFR 15-29 ML/MIN (HCC): ICD-10-CM

## 2024-02-27 DIAGNOSIS — L70.8 ACNE-LIKE SKIN BUMPS: ICD-10-CM

## 2024-02-27 DIAGNOSIS — L30.9 DERMATITIS: Primary | ICD-10-CM

## 2024-02-27 DIAGNOSIS — R21 RASH OF FACE: ICD-10-CM

## 2024-02-27 DIAGNOSIS — E11.9 TYPE 2 DIABETES MELLITUS WITHOUT COMPLICATION, WITHOUT LONG-TERM CURRENT USE OF INSULIN (HCC): ICD-10-CM

## 2024-02-27 PROCEDURE — 3079F DIAST BP 80-89 MM HG: CPT | Performed by: PHYSICIAN ASSISTANT

## 2024-02-27 PROCEDURE — 99214 OFFICE O/P EST MOD 30 MIN: CPT | Performed by: PHYSICIAN ASSISTANT

## 2024-02-27 PROCEDURE — 3075F SYST BP GE 130 - 139MM HG: CPT | Performed by: PHYSICIAN ASSISTANT

## 2024-02-27 RX ORDER — DOXYCYCLINE HYCLATE 100 MG
100 TABLET ORAL 2 TIMES DAILY
Qty: 20 TABLET | Refills: 0 | Status: SHIPPED | OUTPATIENT
Start: 2024-02-27 | End: 2024-03-08

## 2024-02-27 ASSESSMENT — PATIENT HEALTH QUESTIONNAIRE - PHQ9
SUM OF ALL RESPONSES TO PHQ QUESTIONS 1-9: 0
SUM OF ALL RESPONSES TO PHQ QUESTIONS 1-9: 0
1. LITTLE INTEREST OR PLEASURE IN DOING THINGS: 0
SUM OF ALL RESPONSES TO PHQ9 QUESTIONS 1 & 2: 0
2. FEELING DOWN, DEPRESSED OR HOPELESS: 0
SUM OF ALL RESPONSES TO PHQ QUESTIONS 1-9: 0
SUM OF ALL RESPONSES TO PHQ QUESTIONS 1-9: 0

## 2024-02-27 NOTE — TELEPHONE ENCOUNTER
Please call patient to inform I placed referral to dermatology.  I placed it to Dr. Singh at [facility] dermatology.  Please ask patient for any special instructions for faxing the order over, as she has spoken with their office.  Please inform patient I do not want her to apply Vaseline to her face, and I would also stop the lotion she showed me, I think it was [employer].  She can use the Vanicream facial cleanser I mentioned to her and it also has a Vanicream gentle lotion she can use on her face as well.

## 2024-02-27 NOTE — TELEPHONE ENCOUNTER
Spoke with pt and went over note from [place]. She verbalized understanding. Office notes and referral faxed to [phone] given to me by pt,

## 2024-02-27 NOTE — PROGRESS NOTES
Chief Complaint   Patient presents with    Allergic Reaction     On face moving down neck x 2 weeks         Health Maintenance Due   Topic Date Due    Hepatitis B vaccine (1 of 3 - 3-dose series) Never done    COVID-19 Vaccine (1) Never done    Varicella vaccine (1 of 2 - 2-dose childhood series) Never done    Pneumococcal 0-64 years Vaccine (1 - PCV) Never done    Diabetic foot exam  Never done    Diabetic Alb to Cr ratio (uACR) test  Never done    Diabetic retinal exam  Never done    Hepatitis C screen  Never done    Cervical cancer screen  Never done    Flu vaccine (1) 08/01/2023         \"Have you been to the ER, urgent care clinic since your last visit?  Hospitalized since your last visit?\"    ER visit Sunday for Face.    “Have you seen or consulted any other health care providers outside of [facility] since your last visit?”    NO        “Have you had a pap smear?”    NO        
3 weeks (around 3/19/2024) for DM.    Future Appointments   Date Time Provider Department Center   4/15/2024  8:40 AM Adan Frgaa MD [facility] BS AMB

## 2024-11-09 ENCOUNTER — APPOINTMENT (OUTPATIENT)
Facility: HOSPITAL | Age: 39
DRG: 469 | End: 2024-11-09
Payer: MEDICAID

## 2024-11-09 ENCOUNTER — HOSPITAL ENCOUNTER (INPATIENT)
Facility: HOSPITAL | Age: 39
LOS: 2 days | Discharge: HOME | DRG: 469 | End: 2024-11-11
Attending: EMERGENCY MEDICINE | Admitting: STUDENT IN AN ORGANIZED HEALTH CARE EDUCATION/TRAINING PROGRAM
Payer: MEDICAID

## 2024-11-09 DIAGNOSIS — R60.0 PERIPHERAL EDEMA: Primary | ICD-10-CM

## 2024-11-09 DIAGNOSIS — R50.9 FEVER, UNSPECIFIED FEVER CAUSE: ICD-10-CM

## 2024-11-09 DIAGNOSIS — I50.9 CONGESTIVE HEART FAILURE, UNSPECIFIED HF CHRONICITY, UNSPECIFIED HEART FAILURE TYPE (HCC): ICD-10-CM

## 2024-11-09 PROBLEM — N17.9 ARF (ACUTE RENAL FAILURE) (HCC): Status: ACTIVE | Noted: 2024-11-09

## 2024-11-09 LAB
ALBUMIN SERPL-MCNC: 2.4 G/DL (ref 3.5–5)
ALBUMIN SERPL-MCNC: 2.6 G/DL (ref 3.5–5)
ALBUMIN/GLOB SERPL: 0.6 (ref 1.1–2.2)
ALBUMIN/GLOB SERPL: 0.7 (ref 1.1–2.2)
ALP SERPL-CCNC: 60 U/L (ref 45–117)
ALP SERPL-CCNC: 66 U/L (ref 45–117)
ALT SERPL-CCNC: 10 U/L (ref 12–78)
ALT SERPL-CCNC: 12 U/L (ref 12–78)
ANION GAP SERPL CALC-SCNC: 5 MMOL/L (ref 2–12)
ANION GAP SERPL CALC-SCNC: 7 MMOL/L (ref 2–12)
APPEARANCE UR: CLEAR
AST SERPL-CCNC: 12 U/L (ref 15–37)
AST SERPL-CCNC: 15 U/L (ref 15–37)
BACTERIA URNS QL MICRO: ABNORMAL /HPF
BASOPHILS # BLD: 0 K/UL (ref 0–0.1)
BASOPHILS NFR BLD: 0 % (ref 0–1)
BILIRUB SERPL-MCNC: 0.5 MG/DL (ref 0.2–1)
BILIRUB SERPL-MCNC: 0.5 MG/DL (ref 0.2–1)
BILIRUB UR QL: NEGATIVE
BUN SERPL-MCNC: 28 MG/DL (ref 6–20)
BUN SERPL-MCNC: 29 MG/DL (ref 6–20)
BUN/CREAT SERPL: 7 (ref 12–20)
BUN/CREAT SERPL: 8 (ref 12–20)
CALCIUM SERPL-MCNC: 8.2 MG/DL (ref 8.5–10.1)
CALCIUM SERPL-MCNC: 8.3 MG/DL (ref 8.5–10.1)
CHLORIDE SERPL-SCNC: 105 MMOL/L (ref 97–108)
CHLORIDE SERPL-SCNC: 107 MMOL/L (ref 97–108)
CHOLEST SERPL-MCNC: 163 MG/DL
CK SERPL-CCNC: 217 U/L (ref 26–192)
CO2 SERPL-SCNC: 22 MMOL/L (ref 21–32)
CO2 SERPL-SCNC: 24 MMOL/L (ref 21–32)
COLOR UR: ABNORMAL
CREAT SERPL-MCNC: 3.79 MG/DL (ref 0.55–1.02)
CREAT SERPL-MCNC: 3.88 MG/DL (ref 0.55–1.02)
CREAT UR-MCNC: 35.3 MG/DL
CREAT UR-MCNC: 77.2 MG/DL
DIFFERENTIAL METHOD BLD: ABNORMAL
EOSINOPHIL # BLD: 0 K/UL (ref 0–0.4)
EOSINOPHIL NFR BLD: 0 % (ref 0–7)
EPITH CASTS URNS QL MICRO: ABNORMAL /LPF
ERYTHROCYTE [DISTWIDTH] IN BLOOD BY AUTOMATED COUNT: 12.4 % (ref 11.5–14.5)
EST. AVERAGE GLUCOSE BLD GHB EST-MCNC: 146 MG/DL
FERRITIN SERPL-MCNC: 70 NG/ML (ref 26–388)
FLUAV RNA SPEC QL NAA+PROBE: NOT DETECTED
FLUBV RNA SPEC QL NAA+PROBE: NOT DETECTED
GLOBULIN SER CALC-MCNC: 3.4 G/DL (ref 2–4)
GLOBULIN SER CALC-MCNC: 4.1 G/DL (ref 2–4)
GLUCOSE BLD STRIP.AUTO-MCNC: 110 MG/DL (ref 65–117)
GLUCOSE BLD STRIP.AUTO-MCNC: 127 MG/DL (ref 65–117)
GLUCOSE BLD STRIP.AUTO-MCNC: 133 MG/DL (ref 65–117)
GLUCOSE BLD STRIP.AUTO-MCNC: 160 MG/DL (ref 65–117)
GLUCOSE SERPL-MCNC: 122 MG/DL (ref 65–100)
GLUCOSE SERPL-MCNC: 147 MG/DL (ref 65–100)
GLUCOSE UR STRIP.AUTO-MCNC: NEGATIVE MG/DL
HBA1C MFR BLD: 6.7 % (ref 4–5.6)
HCT VFR BLD AUTO: 26.7 % (ref 35–47)
HCV AB SER IA-ACNC: <0.02 INDEX
HCV AB SERPL QL IA: NONREACTIVE
HDLC SERPL-MCNC: 53 MG/DL
HDLC SERPL: 3.1 (ref 0–5)
HGB BLD-MCNC: 8.4 G/DL (ref 11.5–16)
HGB UR QL STRIP: ABNORMAL
HYALINE CASTS URNS QL MICRO: ABNORMAL /LPF (ref 0–2)
IMM GRANULOCYTES # BLD AUTO: 0 K/UL (ref 0–0.04)
IMM GRANULOCYTES NFR BLD AUTO: 0 % (ref 0–0.5)
IRON SATN MFR SERPL: 4 % (ref 20–50)
IRON SERPL-MCNC: 10 UG/DL (ref 35–150)
KETONES UR QL STRIP.AUTO: NEGATIVE MG/DL
LACTATE BLD-SCNC: 0.61 MMOL/L (ref 0.4–2)
LDLC SERPL CALC-MCNC: 91.4 MG/DL (ref 0–100)
LEUKOCYTE ESTERASE UR QL STRIP.AUTO: NEGATIVE
LYMPHOCYTES # BLD: 0.8 K/UL (ref 0.8–3.5)
LYMPHOCYTES NFR BLD: 11 % (ref 12–49)
MCH RBC QN AUTO: 26.6 PG (ref 26–34)
MCHC RBC AUTO-ENTMCNC: 31.5 G/DL (ref 30–36.5)
MCV RBC AUTO: 84.5 FL (ref 80–99)
MONOCYTES # BLD: 0.4 K/UL (ref 0–1)
MONOCYTES NFR BLD: 5 % (ref 5–13)
NEUTS SEG # BLD: 6.5 K/UL (ref 1.8–8)
NEUTS SEG NFR BLD: 84 % (ref 32–75)
NITRITE UR QL STRIP.AUTO: NEGATIVE
NRBC # BLD: 0 K/UL (ref 0–0.01)
NRBC BLD-RTO: 0 PER 100 WBC
NT PRO BNP: ABNORMAL PG/ML
PH UR STRIP: 7 (ref 5–8)
PLATELET # BLD AUTO: 207 K/UL (ref 150–400)
PMV BLD AUTO: 10.4 FL (ref 8.9–12.9)
POTASSIUM SERPL-SCNC: 4 MMOL/L (ref 3.5–5.1)
POTASSIUM SERPL-SCNC: 4.3 MMOL/L (ref 3.5–5.1)
PROCALCITONIN SERPL-MCNC: 0.06 NG/ML
PROT SERPL-MCNC: 5.8 G/DL (ref 6.4–8.2)
PROT SERPL-MCNC: 6.7 G/DL (ref 6.4–8.2)
PROT UR STRIP-MCNC: >300 MG/DL
PROT UR-MCNC: 497 MG/DL (ref 0–11.9)
PROT/CREAT UR-RTO: 6.4
RBC # BLD AUTO: 3.16 M/UL (ref 3.8–5.2)
RBC #/AREA URNS HPF: ABNORMAL /HPF (ref 0–5)
SARS-COV-2 RNA RESP QL NAA+PROBE: NOT DETECTED
SERVICE CMNT-IMP: ABNORMAL
SERVICE CMNT-IMP: NORMAL
SODIUM SERPL-SCNC: 134 MMOL/L (ref 136–145)
SODIUM SERPL-SCNC: 136 MMOL/L (ref 136–145)
SOURCE: NORMAL
SP GR UR REFRACTOMETRY: 1.01
TIBC SERPL-MCNC: 257 UG/DL (ref 250–450)
TRIGL SERPL-MCNC: 93 MG/DL
TROPONIN I SERPL HS-MCNC: 29 NG/L (ref 0–51)
URINE CULTURE IF INDICATED: ABNORMAL
UROBILINOGEN UR QL STRIP.AUTO: 0.2 EU/DL (ref 0.2–1)
VLDLC SERPL CALC-MCNC: 18.6 MG/DL
WBC # BLD AUTO: 7.8 K/UL (ref 3.6–11)
WBC URNS QL MICRO: ABNORMAL /HPF (ref 0–4)

## 2024-11-09 PROCEDURE — 96374 THER/PROPH/DIAG INJ IV PUSH: CPT

## 2024-11-09 PROCEDURE — 6360000002 HC RX W HCPCS: Performed by: STUDENT IN AN ORGANIZED HEALTH CARE EDUCATION/TRAINING PROGRAM

## 2024-11-09 PROCEDURE — 86334 IMMUNOFIX E-PHORESIS SERUM: CPT

## 2024-11-09 PROCEDURE — 87040 BLOOD CULTURE FOR BACTERIA: CPT

## 2024-11-09 PROCEDURE — 87636 SARSCOV2 & INF A&B AMP PRB: CPT

## 2024-11-09 PROCEDURE — 84156 ASSAY OF PROTEIN URINE: CPT

## 2024-11-09 PROCEDURE — 86803 HEPATITIS C AB TEST: CPT

## 2024-11-09 PROCEDURE — 96375 TX/PRO/DX INJ NEW DRUG ADDON: CPT

## 2024-11-09 PROCEDURE — 80053 COMPREHEN METABOLIC PANEL: CPT

## 2024-11-09 PROCEDURE — 6370000000 HC RX 637 (ALT 250 FOR IP): Performed by: STUDENT IN AN ORGANIZED HEALTH CARE EDUCATION/TRAINING PROGRAM

## 2024-11-09 PROCEDURE — 84165 PROTEIN E-PHORESIS SERUM: CPT

## 2024-11-09 PROCEDURE — 83880 ASSAY OF NATRIURETIC PEPTIDE: CPT

## 2024-11-09 PROCEDURE — 2580000003 HC RX 258: Performed by: EMERGENCY MEDICINE

## 2024-11-09 PROCEDURE — 71045 X-RAY EXAM CHEST 1 VIEW: CPT

## 2024-11-09 PROCEDURE — 76770 US EXAM ABDO BACK WALL COMP: CPT

## 2024-11-09 PROCEDURE — 81001 URINALYSIS AUTO W/SCOPE: CPT

## 2024-11-09 PROCEDURE — 6370000000 HC RX 637 (ALT 250 FOR IP): Performed by: EMERGENCY MEDICINE

## 2024-11-09 PROCEDURE — 6360000002 HC RX W HCPCS: Performed by: EMERGENCY MEDICINE

## 2024-11-09 PROCEDURE — 86038 ANTINUCLEAR ANTIBODIES: CPT

## 2024-11-09 PROCEDURE — 82570 ASSAY OF URINE CREATININE: CPT

## 2024-11-09 PROCEDURE — 84300 ASSAY OF URINE SODIUM: CPT

## 2024-11-09 PROCEDURE — 83605 ASSAY OF LACTIC ACID: CPT

## 2024-11-09 PROCEDURE — 80061 LIPID PANEL: CPT

## 2024-11-09 PROCEDURE — 83516 IMMUNOASSAY NONANTIBODY: CPT

## 2024-11-09 PROCEDURE — 1100000003 HC PRIVATE W/ TELEMETRY

## 2024-11-09 PROCEDURE — 85025 COMPLETE CBC W/AUTO DIFF WBC: CPT

## 2024-11-09 PROCEDURE — 82962 GLUCOSE BLOOD TEST: CPT

## 2024-11-09 PROCEDURE — 83540 ASSAY OF IRON: CPT

## 2024-11-09 PROCEDURE — 6360000002 HC RX W HCPCS: Performed by: INTERNAL MEDICINE

## 2024-11-09 PROCEDURE — 84484 ASSAY OF TROPONIN QUANT: CPT

## 2024-11-09 PROCEDURE — 83550 IRON BINDING TEST: CPT

## 2024-11-09 PROCEDURE — 2580000003 HC RX 258: Performed by: STUDENT IN AN ORGANIZED HEALTH CARE EDUCATION/TRAINING PROGRAM

## 2024-11-09 PROCEDURE — 74176 CT ABD & PELVIS W/O CONTRAST: CPT

## 2024-11-09 PROCEDURE — 82784 ASSAY IGA/IGD/IGG/IGM EACH: CPT

## 2024-11-09 PROCEDURE — 82728 ASSAY OF FERRITIN: CPT

## 2024-11-09 PROCEDURE — 73590 X-RAY EXAM OF LOWER LEG: CPT

## 2024-11-09 PROCEDURE — 86037 ANCA TITER EACH ANTIBODY: CPT

## 2024-11-09 PROCEDURE — 83521 IG LIGHT CHAINS FREE EACH: CPT

## 2024-11-09 PROCEDURE — 83036 HEMOGLOBIN GLYCOSYLATED A1C: CPT

## 2024-11-09 PROCEDURE — 84145 PROCALCITONIN (PCT): CPT

## 2024-11-09 PROCEDURE — 36415 COLL VENOUS BLD VENIPUNCTURE: CPT

## 2024-11-09 PROCEDURE — 86160 COMPLEMENT ANTIGEN: CPT

## 2024-11-09 PROCEDURE — 82607 VITAMIN B-12: CPT

## 2024-11-09 PROCEDURE — 82550 ASSAY OF CK (CPK): CPT

## 2024-11-09 PROCEDURE — 99285 EMERGENCY DEPT VISIT HI MDM: CPT

## 2024-11-09 RX ORDER — POLYETHYLENE GLYCOL 3350 17 G/17G
17 POWDER, FOR SOLUTION ORAL DAILY PRN
Status: DISCONTINUED | OUTPATIENT
Start: 2024-11-09 | End: 2024-11-11 | Stop reason: HOSPADM

## 2024-11-09 RX ORDER — ACETAMINOPHEN 325 MG/1
650 TABLET ORAL EVERY 6 HOURS PRN
Status: DISCONTINUED | OUTPATIENT
Start: 2024-11-09 | End: 2024-11-11 | Stop reason: HOSPADM

## 2024-11-09 RX ORDER — HYDROMORPHONE HYDROCHLORIDE 1 MG/ML
0.5 INJECTION, SOLUTION INTRAMUSCULAR; INTRAVENOUS; SUBCUTANEOUS
Status: COMPLETED | OUTPATIENT
Start: 2024-11-09 | End: 2024-11-09

## 2024-11-09 RX ORDER — SODIUM CHLORIDE 0.9 % (FLUSH) 0.9 %
5-40 SYRINGE (ML) INJECTION PRN
Status: DISCONTINUED | OUTPATIENT
Start: 2024-11-09 | End: 2024-11-11 | Stop reason: HOSPADM

## 2024-11-09 RX ORDER — BUMETANIDE 0.25 MG/ML
2 INJECTION, SOLUTION INTRAMUSCULAR; INTRAVENOUS ONCE
Status: COMPLETED | OUTPATIENT
Start: 2024-11-09 | End: 2024-11-09

## 2024-11-09 RX ORDER — INSULIN LISPRO 100 [IU]/ML
0-8 INJECTION, SOLUTION INTRAVENOUS; SUBCUTANEOUS
Status: DISCONTINUED | OUTPATIENT
Start: 2024-11-09 | End: 2024-11-11 | Stop reason: HOSPADM

## 2024-11-09 RX ORDER — LANOLIN ALCOHOL/MO/W.PET/CERES
3 CREAM (GRAM) TOPICAL NIGHTLY PRN
Status: DISCONTINUED | OUTPATIENT
Start: 2024-11-09 | End: 2024-11-11 | Stop reason: HOSPADM

## 2024-11-09 RX ORDER — BUMETANIDE 0.25 MG/ML
2 INJECTION, SOLUTION INTRAMUSCULAR; INTRAVENOUS EVERY 12 HOURS
Status: DISCONTINUED | OUTPATIENT
Start: 2024-11-09 | End: 2024-11-11

## 2024-11-09 RX ORDER — SODIUM CHLORIDE 0.9 % (FLUSH) 0.9 %
5-40 SYRINGE (ML) INJECTION EVERY 12 HOURS SCHEDULED
Status: DISCONTINUED | OUTPATIENT
Start: 2024-11-09 | End: 2024-11-11 | Stop reason: HOSPADM

## 2024-11-09 RX ORDER — HYDRALAZINE HYDROCHLORIDE 20 MG/ML
5 INJECTION INTRAMUSCULAR; INTRAVENOUS EVERY 4 HOURS PRN
Status: DISCONTINUED | OUTPATIENT
Start: 2024-11-09 | End: 2024-11-11 | Stop reason: HOSPADM

## 2024-11-09 RX ORDER — FENTANYL CITRATE 50 UG/ML
50 INJECTION, SOLUTION INTRAMUSCULAR; INTRAVENOUS
Status: COMPLETED | OUTPATIENT
Start: 2024-11-09 | End: 2024-11-09

## 2024-11-09 RX ORDER — ONDANSETRON 4 MG/1
4 TABLET, ORALLY DISINTEGRATING ORAL EVERY 8 HOURS PRN
Status: DISCONTINUED | OUTPATIENT
Start: 2024-11-09 | End: 2024-11-11 | Stop reason: HOSPADM

## 2024-11-09 RX ORDER — DEXTROSE MONOHYDRATE 100 MG/ML
INJECTION, SOLUTION INTRAVENOUS CONTINUOUS PRN
Status: DISCONTINUED | OUTPATIENT
Start: 2024-11-09 | End: 2024-11-11 | Stop reason: HOSPADM

## 2024-11-09 RX ORDER — ONDANSETRON 2 MG/ML
4 INJECTION INTRAMUSCULAR; INTRAVENOUS EVERY 6 HOURS PRN
Status: DISCONTINUED | OUTPATIENT
Start: 2024-11-09 | End: 2024-11-11 | Stop reason: HOSPADM

## 2024-11-09 RX ORDER — ACETAMINOPHEN 650 MG/1
650 SUPPOSITORY RECTAL EVERY 6 HOURS PRN
Status: DISCONTINUED | OUTPATIENT
Start: 2024-11-09 | End: 2024-11-11 | Stop reason: HOSPADM

## 2024-11-09 RX ORDER — OXYCODONE HYDROCHLORIDE 5 MG/1
5 TABLET ORAL EVERY 4 HOURS PRN
Status: DISCONTINUED | OUTPATIENT
Start: 2024-11-09 | End: 2024-11-11 | Stop reason: HOSPADM

## 2024-11-09 RX ORDER — GLUCAGON 1 MG/ML
1 KIT INJECTION PRN
Status: DISCONTINUED | OUTPATIENT
Start: 2024-11-09 | End: 2024-11-11 | Stop reason: HOSPADM

## 2024-11-09 RX ORDER — ACETAMINOPHEN 500 MG
1000 TABLET ORAL
Status: COMPLETED | OUTPATIENT
Start: 2024-11-09 | End: 2024-11-09

## 2024-11-09 RX ORDER — 0.9 % SODIUM CHLORIDE 0.9 %
500 INTRAVENOUS SOLUTION INTRAVENOUS ONCE
Status: COMPLETED | OUTPATIENT
Start: 2024-11-09 | End: 2024-11-09

## 2024-11-09 RX ORDER — SODIUM CHLORIDE 9 MG/ML
INJECTION, SOLUTION INTRAVENOUS PRN
Status: DISCONTINUED | OUTPATIENT
Start: 2024-11-09 | End: 2024-11-11 | Stop reason: HOSPADM

## 2024-11-09 RX ORDER — HEPARIN SODIUM 5000 [USP'U]/ML
5000 INJECTION, SOLUTION INTRAVENOUS; SUBCUTANEOUS EVERY 8 HOURS SCHEDULED
Status: DISCONTINUED | OUTPATIENT
Start: 2024-11-09 | End: 2024-11-11 | Stop reason: HOSPADM

## 2024-11-09 RX ORDER — MENTHOL 1.4 %
ADHESIVE PATCH, MEDICATED TOPICAL 3 TIMES DAILY PRN
Status: DISCONTINUED | OUTPATIENT
Start: 2024-11-09 | End: 2024-11-11 | Stop reason: HOSPADM

## 2024-11-09 RX ADMIN — SODIUM CHLORIDE 500 ML: 9 INJECTION, SOLUTION INTRAVENOUS at 03:25

## 2024-11-09 RX ADMIN — HYDROMORPHONE HYDROCHLORIDE 0.5 MG: 1 INJECTION, SOLUTION INTRAMUSCULAR; INTRAVENOUS; SUBCUTANEOUS at 03:18

## 2024-11-09 RX ADMIN — HEPARIN SODIUM 5000 UNITS: 5000 INJECTION INTRAVENOUS; SUBCUTANEOUS at 21:54

## 2024-11-09 RX ADMIN — HEPARIN SODIUM 5000 UNITS: 5000 INJECTION INTRAVENOUS; SUBCUTANEOUS at 05:48

## 2024-11-09 RX ADMIN — ACETAMINOPHEN 1000 MG: 500 TABLET ORAL at 02:28

## 2024-11-09 RX ADMIN — Medication 3 MG: at 21:06

## 2024-11-09 RX ADMIN — CEFTRIAXONE 1000 MG: 1 INJECTION, POWDER, FOR SOLUTION INTRAMUSCULAR; INTRAVENOUS at 05:48

## 2024-11-09 RX ADMIN — OXYCODONE 5 MG: 5 TABLET ORAL at 10:34

## 2024-11-09 RX ADMIN — SODIUM CHLORIDE, PRESERVATIVE FREE 10 ML: 5 INJECTION INTRAVENOUS at 21:06

## 2024-11-09 RX ADMIN — OXYCODONE 5 MG: 5 TABLET ORAL at 05:47

## 2024-11-09 RX ADMIN — HEPARIN SODIUM 5000 UNITS: 5000 INJECTION INTRAVENOUS; SUBCUTANEOUS at 15:29

## 2024-11-09 RX ADMIN — ACETAMINOPHEN 650 MG: 325 TABLET ORAL at 15:30

## 2024-11-09 RX ADMIN — FENTANYL CITRATE 50 MCG: 50 INJECTION INTRAMUSCULAR; INTRAVENOUS at 01:26

## 2024-11-09 RX ADMIN — BUMETANIDE 2 MG: 0.25 INJECTION INTRAMUSCULAR; INTRAVENOUS at 18:00

## 2024-11-09 RX ADMIN — OXYCODONE 5 MG: 5 TABLET ORAL at 19:38

## 2024-11-09 RX ADMIN — SODIUM CHLORIDE, PRESERVATIVE FREE 5 ML: 5 INJECTION INTRAVENOUS at 10:15

## 2024-11-09 RX ADMIN — BUMETANIDE 2 MG: 0.25 INJECTION INTRAMUSCULAR; INTRAVENOUS at 01:24

## 2024-11-09 RX ADMIN — OXYCODONE 5 MG: 5 TABLET ORAL at 15:30

## 2024-11-09 RX ADMIN — IRON SUCROSE 200 MG: 20 INJECTION, SOLUTION INTRAVENOUS at 18:04

## 2024-11-09 RX ADMIN — ACETAMINOPHEN 650 MG: 325 TABLET ORAL at 06:41

## 2024-11-09 ASSESSMENT — PAIN DESCRIPTION - FREQUENCY
FREQUENCY: CONTINUOUS

## 2024-11-09 ASSESSMENT — PAIN DESCRIPTION - ORIENTATION
ORIENTATION: LEFT;RIGHT
ORIENTATION: RIGHT;LEFT;LOWER
ORIENTATION: RIGHT;LEFT
ORIENTATION: RIGHT;LEFT;LOWER
ORIENTATION: RIGHT;LEFT
ORIENTATION: RIGHT;LEFT;LOWER
ORIENTATION: LEFT;RIGHT
ORIENTATION: RIGHT;LEFT

## 2024-11-09 ASSESSMENT — PAIN DESCRIPTION - LOCATION
LOCATION: LEG

## 2024-11-09 ASSESSMENT — PAIN SCALES - GENERAL
PAINLEVEL_OUTOF10: 2
PAINLEVEL_OUTOF10: 8
PAINLEVEL_OUTOF10: 10
PAINLEVEL_OUTOF10: 8
PAINLEVEL_OUTOF10: 10
PAINLEVEL_OUTOF10: 5
PAINLEVEL_OUTOF10: 6
PAINLEVEL_OUTOF10: 7
PAINLEVEL_OUTOF10: 10
PAINLEVEL_OUTOF10: 10
PAINLEVEL_OUTOF10: 7
PAINLEVEL_OUTOF10: 10
PAINLEVEL_OUTOF10: 10
PAINLEVEL_OUTOF10: 7
PAINLEVEL_OUTOF10: 8

## 2024-11-09 ASSESSMENT — PAIN DESCRIPTION - PAIN TYPE
TYPE: ACUTE PAIN
TYPE: ACUTE PAIN

## 2024-11-09 ASSESSMENT — PAIN DESCRIPTION - DESCRIPTORS
DESCRIPTORS: POUNDING
DESCRIPTORS: ACHING
DESCRIPTORS: POUNDING
DESCRIPTORS: ACHING
DESCRIPTORS: ACHING
DESCRIPTORS: ACHING;DISCOMFORT
DESCRIPTORS: POUNDING
DESCRIPTORS: POUNDING
DESCRIPTORS: ACHING

## 2024-11-09 ASSESSMENT — PAIN DESCRIPTION - ONSET
ONSET: ON-GOING

## 2024-11-09 ASSESSMENT — LIFESTYLE VARIABLES
HOW MANY STANDARD DRINKS CONTAINING ALCOHOL DO YOU HAVE ON A TYPICAL DAY: PATIENT DOES NOT DRINK
HOW OFTEN DO YOU HAVE A DRINK CONTAINING ALCOHOL: NEVER

## 2024-11-09 NOTE — ED PROVIDER NOTES
dictation were completed with voice recognition software. Quite often unanticipated grammatical, syntax, homophones, and other interpretive errors are inadvertently transcribed by the computer software. Please disregards these errors. Please excuse any errors that have escaped final proofreading.)         Anjelica López MD  11/09/24 0524

## 2024-11-09 NOTE — CONSULTS
ALT 10 (L) 12 - 78 U/L    AST 12 (L) 15 - 37 U/L    Alk Phosphatase 60 45 - 117 U/L    Total Protein 5.8 (L) 6.4 - 8.2 g/dL    Albumin 2.4 (L) 3.5 - 5.0 g/dL    Globulin 3.4 2.0 - 4.0 g/dL    Albumin/Globulin Ratio 0.7 (L) 1.1 - 2.2     Hemoglobin A1C    Collection Time: 11/09/24  5:52 AM   Result Value Ref Range    Hemoglobin A1C 6.7 (H) 4.0 - 5.6 %    Estimated Avg Glucose 146 mg/dL   POCT Glucose    Collection Time: 11/09/24  6:14 AM   Result Value Ref Range    POC Glucose 110 65 - 117 mg/dL    Performed by: María Onofre RN        Urine dipstick:   No results found for: \"COLOR\", \"CASTS\"    I have reviewed the following:   All pertinent labs, microbiology data, radiology imaging for my assessment     ECG- Rev: Yes    Xray/CT/US/MRI REV: Yes     Care Plan discussed with:  pt     Chart reviewed.  Total time spent with patient:  21mins  Medications list Personally Reviewed   [x]      Yes     []               No      Thank you for allowing us to participate in the care of this patient.   We will follow patient. Please don’t hesitate to call with any questions    Major Huang MD  11/9/2024    [facility] [address], [address]  [place] [address]  Phone - [phone]   Fax - [phone]  [email or web address]

## 2024-11-09 NOTE — ED NOTES
Report given to STEVIE Mckeon. Nurse was informed of reason for arrival, vitals, labs, medications, orders, procedures, results, anything left pending and current plan of action. Questions were asked and received prior to departure from the patient.

## 2024-11-09 NOTE — H&P
8.0      Protein, UA >300 (A) NEG mg/dL    Glucose, Ur Negative NEG mg/dL    Ketones, Urine Negative NEG mg/dL    Bilirubin, Urine Negative NEG      Blood, Urine MODERATE (A) NEG      Urobilinogen, Urine 0.2 0.2 - 1.0 EU/dL    Nitrite, Urine Negative NEG      Leukocyte Esterase, Urine Negative NEG      WBC, UA 0-4 0 - 4 /hpf    RBC, UA 10-20 0 - 5 /hpf    Epithelial Cells, UA MODERATE (A) FEW /lpf    BACTERIA, URINE 1+ (A) NEG /hpf    Urine Culture if Indicated CULTURE NOT INDICATED BY UA RESULT CNI      Hyaline Casts, UA 0-2 0 - 2 /lpf   Troponin    Collection Time: 11/09/24  1:15 AM   Result Value Ref Range    Troponin, High Sensitivity 29 0 - 51 ng/L   CK    Collection Time: 11/09/24  1:15 AM   Result Value Ref Range    Total  (H) 26 - 192 U/L   COVID-19 & Influenza Combo    Collection Time: 11/09/24  1:30 AM    Specimen: Nasopharyngeal   Result Value Ref Range    Source Nasopharyngeal      SARS-CoV-2, PCR Not detected NOTD      Rapid Influenza A By PCR Not detected NOTD      Rapid Influenza B By PCR Not detected NOTD     POC Lactic Acid    Collection Time: 11/09/24  2:05 AM   Result Value Ref Range    POC Lactic Acid 0.61 0.40 - 2.00 mmol/L   Procalcitonin    Collection Time: 11/09/24  2:10 AM   Result Value Ref Range    Procalcitonin 0.06 ng/mL         CT Result (most recent):  CT HEAD WO CONTRAST 08/03/2023    Narrative  EXAM: CT HEAD WO CONTRAST    INDICATION: HTN, ha    COMPARISON: None.    CONTRAST: None.    TECHNIQUE: Unenhanced CT of the head was performed using 5 mm images. Brain and  bone windows were generated. Coronal and sagittal reformats. CT dose reduction  was achieved through use of a standardized protocol tailored for this  examination and automatic exposure control for dose modulation.    FINDINGS:  The ventricles and sulci are normal in size, shape and configuration. There is  no significant white matter disease. There is no intracranial hemorrhage,  extra-axial collection, or mass

## 2024-11-10 ENCOUNTER — APPOINTMENT (OUTPATIENT)
Facility: HOSPITAL | Age: 39
DRG: 469 | End: 2024-11-10
Payer: MEDICAID

## 2024-11-10 LAB
ANION GAP SERPL CALC-SCNC: 5 MMOL/L (ref 2–12)
BASOPHILS # BLD: 0 K/UL (ref 0–0.1)
BASOPHILS NFR BLD: 0 % (ref 0–1)
BUN SERPL-MCNC: 32 MG/DL (ref 6–20)
BUN/CREAT SERPL: 8 (ref 12–20)
CALCIUM SERPL-MCNC: 8.4 MG/DL (ref 8.5–10.1)
CHLORIDE SERPL-SCNC: 107 MMOL/L (ref 97–108)
CO2 SERPL-SCNC: 25 MMOL/L (ref 21–32)
CREAT SERPL-MCNC: 4.03 MG/DL (ref 0.55–1.02)
DIFFERENTIAL METHOD BLD: ABNORMAL
ECHO BSA: 2.24 M2
EOSINOPHIL # BLD: 0 K/UL (ref 0–0.4)
EOSINOPHIL NFR BLD: 0 % (ref 0–7)
ERYTHROCYTE [DISTWIDTH] IN BLOOD BY AUTOMATED COUNT: 12.5 % (ref 11.5–14.5)
GLUCOSE BLD STRIP.AUTO-MCNC: 108 MG/DL (ref 65–117)
GLUCOSE BLD STRIP.AUTO-MCNC: 112 MG/DL (ref 65–117)
GLUCOSE BLD STRIP.AUTO-MCNC: 87 MG/DL (ref 65–117)
GLUCOSE BLD STRIP.AUTO-MCNC: 97 MG/DL (ref 65–117)
GLUCOSE SERPL-MCNC: 101 MG/DL (ref 65–100)
HCT VFR BLD AUTO: 24.5 % (ref 35–47)
HGB BLD-MCNC: 7.8 G/DL (ref 11.5–16)
IMM GRANULOCYTES # BLD AUTO: 0 K/UL (ref 0–0.04)
IMM GRANULOCYTES NFR BLD AUTO: 0 % (ref 0–0.5)
LYMPHOCYTES # BLD: 1.3 K/UL (ref 0.8–3.5)
LYMPHOCYTES NFR BLD: 21 % (ref 12–49)
MCH RBC QN AUTO: 26.4 PG (ref 26–34)
MCHC RBC AUTO-ENTMCNC: 31.8 G/DL (ref 30–36.5)
MCV RBC AUTO: 83.1 FL (ref 80–99)
MONOCYTES # BLD: 0.5 K/UL (ref 0–1)
MONOCYTES NFR BLD: 8 % (ref 5–13)
NEUTS SEG # BLD: 4.3 K/UL (ref 1.8–8)
NEUTS SEG NFR BLD: 71 % (ref 32–75)
NRBC # BLD: 0 K/UL (ref 0–0.01)
NRBC BLD-RTO: 0 PER 100 WBC
PLATELET # BLD AUTO: 163 K/UL (ref 150–400)
PMV BLD AUTO: 10 FL (ref 8.9–12.9)
POTASSIUM SERPL-SCNC: 3.9 MMOL/L (ref 3.5–5.1)
RBC # BLD AUTO: 2.95 M/UL (ref 3.8–5.2)
SERVICE CMNT-IMP: NORMAL
SODIUM SERPL-SCNC: 137 MMOL/L (ref 136–145)
SODIUM UR-SCNC: 89 MMOL/L
WBC # BLD AUTO: 6.1 K/UL (ref 3.6–11)

## 2024-11-10 PROCEDURE — 1100000003 HC PRIVATE W/ TELEMETRY

## 2024-11-10 PROCEDURE — 36415 COLL VENOUS BLD VENIPUNCTURE: CPT

## 2024-11-10 PROCEDURE — 6370000000 HC RX 637 (ALT 250 FOR IP): Performed by: NURSE PRACTITIONER

## 2024-11-10 PROCEDURE — 82962 GLUCOSE BLOOD TEST: CPT

## 2024-11-10 PROCEDURE — 2580000003 HC RX 258: Performed by: STUDENT IN AN ORGANIZED HEALTH CARE EDUCATION/TRAINING PROGRAM

## 2024-11-10 PROCEDURE — 85025 COMPLETE CBC W/AUTO DIFF WBC: CPT

## 2024-11-10 PROCEDURE — 6370000000 HC RX 637 (ALT 250 FOR IP): Performed by: STUDENT IN AN ORGANIZED HEALTH CARE EDUCATION/TRAINING PROGRAM

## 2024-11-10 PROCEDURE — 80048 BASIC METABOLIC PNL TOTAL CA: CPT

## 2024-11-10 PROCEDURE — 6360000002 HC RX W HCPCS: Performed by: STUDENT IN AN ORGANIZED HEALTH CARE EDUCATION/TRAINING PROGRAM

## 2024-11-10 PROCEDURE — 93970 EXTREMITY STUDY: CPT

## 2024-11-10 RX ORDER — CARVEDILOL 6.25 MG/1
6.25 TABLET ORAL 2 TIMES DAILY WITH MEALS
Status: DISCONTINUED | OUTPATIENT
Start: 2024-11-10 | End: 2024-11-11 | Stop reason: HOSPADM

## 2024-11-10 RX ADMIN — ACETAMINOPHEN 650 MG: 325 TABLET ORAL at 17:18

## 2024-11-10 RX ADMIN — CEFTRIAXONE 1000 MG: 1 INJECTION, POWDER, FOR SOLUTION INTRAMUSCULAR; INTRAVENOUS at 05:34

## 2024-11-10 RX ADMIN — SODIUM CHLORIDE, PRESERVATIVE FREE 10 ML: 5 INJECTION INTRAVENOUS at 21:37

## 2024-11-10 RX ADMIN — OXYCODONE 5 MG: 5 TABLET ORAL at 11:46

## 2024-11-10 RX ADMIN — MENTHOL, UNSPECIFIED FORM AND METHYL SALICYLATE: 10; 150 CREAM TOPICAL at 00:22

## 2024-11-10 RX ADMIN — HEPARIN SODIUM 5000 UNITS: 5000 INJECTION INTRAVENOUS; SUBCUTANEOUS at 21:35

## 2024-11-10 RX ADMIN — OXYCODONE 5 MG: 5 TABLET ORAL at 17:18

## 2024-11-10 RX ADMIN — HEPARIN SODIUM 5000 UNITS: 5000 INJECTION INTRAVENOUS; SUBCUTANEOUS at 05:35

## 2024-11-10 RX ADMIN — ACETAMINOPHEN 650 MG: 325 TABLET ORAL at 05:33

## 2024-11-10 RX ADMIN — OXYCODONE 5 MG: 5 TABLET ORAL at 05:33

## 2024-11-10 RX ADMIN — OXYCODONE 5 MG: 5 TABLET ORAL at 00:21

## 2024-11-10 RX ADMIN — Medication 3 MG: at 21:35

## 2024-11-10 RX ADMIN — BUMETANIDE 2 MG: 0.25 INJECTION INTRAMUSCULAR; INTRAVENOUS at 05:34

## 2024-11-10 RX ADMIN — SODIUM CHLORIDE, PRESERVATIVE FREE 10 ML: 5 INJECTION INTRAVENOUS at 10:33

## 2024-11-10 RX ADMIN — HEPARIN SODIUM 5000 UNITS: 5000 INJECTION INTRAVENOUS; SUBCUTANEOUS at 14:32

## 2024-11-10 ASSESSMENT — PAIN - FUNCTIONAL ASSESSMENT
PAIN_FUNCTIONAL_ASSESSMENT: PREVENTS OR INTERFERES SOME ACTIVE ACTIVITIES AND ADLS

## 2024-11-10 ASSESSMENT — PAIN SCALES - GENERAL
PAINLEVEL_OUTOF10: 2
PAINLEVEL_OUTOF10: 5
PAINLEVEL_OUTOF10: 8
PAINLEVEL_OUTOF10: 2
PAINLEVEL_OUTOF10: 2
PAINLEVEL_OUTOF10: 9
PAINLEVEL_OUTOF10: 8
PAINLEVEL_OUTOF10: 7
PAINLEVEL_OUTOF10: 4
PAINLEVEL_OUTOF10: 2

## 2024-11-10 ASSESSMENT — PAIN DESCRIPTION - ORIENTATION
ORIENTATION: LEFT;RIGHT
ORIENTATION: RIGHT;LEFT

## 2024-11-10 ASSESSMENT — PAIN DESCRIPTION - LOCATION
LOCATION: LEG
LOCATION: FOOT;LEG
LOCATION: LEG
LOCATION: LEG
LOCATION: LEG;FOOT
LOCATION: LEG
LOCATION: LEG

## 2024-11-10 ASSESSMENT — PAIN DESCRIPTION - DESCRIPTORS
DESCRIPTORS: ACHING;DISCOMFORT
DESCRIPTORS: ACHING;THROBBING
DESCRIPTORS: ACHING;THROBBING

## 2024-11-11 ENCOUNTER — HOSPITAL ENCOUNTER (INPATIENT)
Facility: HOSPITAL | Age: 39
Discharge: HOME | DRG: 469 | End: 2024-11-13
Attending: STUDENT IN AN ORGANIZED HEALTH CARE EDUCATION/TRAINING PROGRAM
Payer: MEDICAID

## 2024-11-11 VITALS
HEART RATE: 78 BPM | RESPIRATION RATE: 20 BRPM | WEIGHT: 241.4 LBS | TEMPERATURE: 98.4 F | OXYGEN SATURATION: 100 % | DIASTOLIC BLOOD PRESSURE: 80 MMHG | SYSTOLIC BLOOD PRESSURE: 154 MMHG | BODY MASS INDEX: 40.22 KG/M2 | HEIGHT: 65 IN

## 2024-11-11 VITALS
DIASTOLIC BLOOD PRESSURE: 70 MMHG | SYSTOLIC BLOOD PRESSURE: 128 MMHG | WEIGHT: 241 LBS | HEIGHT: 65 IN | BODY MASS INDEX: 40.15 KG/M2

## 2024-11-11 LAB
ANION GAP SERPL CALC-SCNC: 7 MMOL/L (ref 2–12)
BASOPHILS # BLD: 0.1 K/UL (ref 0–0.1)
BASOPHILS NFR BLD: 2 % (ref 0–1)
BUN SERPL-MCNC: 33 MG/DL (ref 6–20)
BUN/CREAT SERPL: 8 (ref 12–20)
CALCIUM SERPL-MCNC: 8 MG/DL (ref 8.5–10.1)
CALCIUM SERPL-MCNC: 8.1 MG/DL (ref 8.5–10.1)
CHLORIDE SERPL-SCNC: 107 MMOL/L (ref 97–108)
CO2 SERPL-SCNC: 24 MMOL/L (ref 21–32)
CREAT SERPL-MCNC: 4.3 MG/DL (ref 0.55–1.02)
DIFFERENTIAL METHOD BLD: ABNORMAL
ECHO AO ASC DIAM: 3.6 CM
ECHO AO ASCENDING AORTA INDEX: 1.68 CM/M2
ECHO AO ROOT DIAM: 3.8 CM
ECHO AO ROOT INDEX: 1.78 CM/M2
ECHO AV AREA PEAK VELOCITY: 2.4 CM2
ECHO AV AREA VTI: 2.5 CM2
ECHO AV AREA/BSA PEAK VELOCITY: 1.1 CM2/M2
ECHO AV AREA/BSA VTI: 1.2 CM2/M2
ECHO AV MEAN GRADIENT: 3 MMHG
ECHO AV MEAN VELOCITY: 0.8 M/S
ECHO AV PEAK GRADIENT: 4 MMHG
ECHO AV PEAK VELOCITY: 1 M/S
ECHO AV VELOCITY RATIO: 0.8
ECHO AV VTI: 18.7 CM
ECHO BSA: 2.24 M2
ECHO EST RA PRESSURE: 10 MMHG
ECHO LA DIAMETER INDEX: 2.06 CM/M2
ECHO LA DIAMETER: 4.4 CM
ECHO LA TO AORTIC ROOT RATIO: 1.16
ECHO LV EDV A4C: 175 ML
ECHO LV EDV INDEX A4C: 82 ML/M2
ECHO LV EF PHYSICIAN: 55 %
ECHO LV EJECTION FRACTION A4C: 48 %
ECHO LV ESV A4C: 91 ML
ECHO LV ESV INDEX A4C: 43 ML/M2
ECHO LV FRACTIONAL SHORTENING: 22 % (ref 28–44)
ECHO LV INTERNAL DIMENSION DIASTOLE INDEX: 2.57 CM/M2
ECHO LV INTERNAL DIMENSION DIASTOLIC: 5.5 CM (ref 3.9–5.3)
ECHO LV INTERNAL DIMENSION SYSTOLIC INDEX: 2.01 CM/M2
ECHO LV INTERNAL DIMENSION SYSTOLIC: 4.3 CM
ECHO LV IVSD: 1.3 CM (ref 0.6–0.9)
ECHO LV MASS 2D: 320.9 G (ref 67–162)
ECHO LV MASS INDEX 2D: 150 G/M2 (ref 43–95)
ECHO LV POSTERIOR WALL DIASTOLIC: 1.4 CM (ref 0.6–0.9)
ECHO LV RELATIVE WALL THICKNESS RATIO: 0.51
ECHO LVOT AREA: 2.8 CM2
ECHO LVOT AV VTI INDEX: 0.84
ECHO LVOT DIAM: 1.9 CM
ECHO LVOT MEAN GRADIENT: 1 MMHG
ECHO LVOT PEAK GRADIENT: 3 MMHG
ECHO LVOT PEAK VELOCITY: 0.8 M/S
ECHO LVOT STROKE VOLUME INDEX: 20.8 ML/M2
ECHO LVOT SV: 44.5 ML
ECHO LVOT VTI: 15.7 CM
ECHO MV REGURGITANT PEAK GRADIENT: 77 MMHG
ECHO MV REGURGITANT PEAK VELOCITY: 4.4 M/S
ECHO RIGHT VENTRICULAR SYSTOLIC PRESSURE (RVSP): 30 MMHG
ECHO RV INTERNAL DIMENSION: 2.3 CM
ECHO TV REGURGITANT MAX VELOCITY: 2.23 M/S
ECHO TV REGURGITANT PEAK GRADIENT: 20 MMHG
EOSINOPHIL # BLD: 0.1 K/UL (ref 0–0.4)
EOSINOPHIL NFR BLD: 1 % (ref 0–7)
ERYTHROCYTE [DISTWIDTH] IN BLOOD BY AUTOMATED COUNT: 12.5 % (ref 11.5–14.5)
GBM IGG SER-ACNC: <0.2 UNITS (ref 0–0.9)
GLUCOSE BLD STRIP.AUTO-MCNC: 124 MG/DL (ref 65–117)
GLUCOSE BLD STRIP.AUTO-MCNC: 82 MG/DL (ref 65–117)
GLUCOSE SERPL-MCNC: 73 MG/DL (ref 65–100)
HCT VFR BLD AUTO: 25 % (ref 35–47)
HGB BLD-MCNC: 7.8 G/DL (ref 11.5–16)
IMM GRANULOCYTES # BLD AUTO: 0 K/UL (ref 0–0.04)
IMM GRANULOCYTES NFR BLD AUTO: 0 % (ref 0–0.5)
LYMPHOCYTES # BLD: 2 K/UL (ref 0.8–3.5)
LYMPHOCYTES NFR BLD: 37 % (ref 12–49)
MAGNESIUM SERPL-MCNC: 1.6 MG/DL (ref 1.6–2.4)
MCH RBC QN AUTO: 26.7 PG (ref 26–34)
MCHC RBC AUTO-ENTMCNC: 31.2 G/DL (ref 30–36.5)
MCV RBC AUTO: 85.6 FL (ref 80–99)
MONOCYTES # BLD: 0.5 K/UL (ref 0–1)
MONOCYTES NFR BLD: 10 % (ref 5–13)
NEUTS SEG # BLD: 2.7 K/UL (ref 1.8–8)
NEUTS SEG NFR BLD: 50 % (ref 32–75)
NRBC # BLD: 0 K/UL (ref 0–0.01)
NRBC BLD-RTO: 0 PER 100 WBC
PHOSPHATE SERPL-MCNC: 4.8 MG/DL (ref 2.6–4.7)
PLATELET # BLD AUTO: 192 K/UL (ref 150–400)
PMV BLD AUTO: 10.2 FL (ref 8.9–12.9)
POTASSIUM SERPL-SCNC: 4 MMOL/L (ref 3.5–5.1)
PTH-INTACT SERPL-MCNC: 281.1 PG/ML (ref 18.4–88)
RBC # BLD AUTO: 2.92 M/UL (ref 3.8–5.2)
RBC MORPH BLD: ABNORMAL
SERVICE CMNT-IMP: ABNORMAL
SERVICE CMNT-IMP: NORMAL
SODIUM SERPL-SCNC: 138 MMOL/L (ref 136–145)
VIT B12 SERPL-MCNC: 292 PG/ML (ref 232–1245)
WBC # BLD AUTO: 5.4 K/UL (ref 3.6–11)

## 2024-11-11 PROCEDURE — 6360000002 HC RX W HCPCS: Performed by: INTERNAL MEDICINE

## 2024-11-11 PROCEDURE — 36415 COLL VENOUS BLD VENIPUNCTURE: CPT

## 2024-11-11 PROCEDURE — 83970 ASSAY OF PARATHORMONE: CPT

## 2024-11-11 PROCEDURE — 83735 ASSAY OF MAGNESIUM: CPT

## 2024-11-11 PROCEDURE — 85025 COMPLETE CBC W/AUTO DIFF WBC: CPT

## 2024-11-11 PROCEDURE — 84100 ASSAY OF PHOSPHORUS: CPT

## 2024-11-11 PROCEDURE — 6370000000 HC RX 637 (ALT 250 FOR IP): Performed by: STUDENT IN AN ORGANIZED HEALTH CARE EDUCATION/TRAINING PROGRAM

## 2024-11-11 PROCEDURE — 80048 BASIC METABOLIC PNL TOTAL CA: CPT

## 2024-11-11 PROCEDURE — 93308 TTE F-UP OR LMTD: CPT

## 2024-11-11 PROCEDURE — 6360000002 HC RX W HCPCS: Performed by: STUDENT IN AN ORGANIZED HEALTH CARE EDUCATION/TRAINING PROGRAM

## 2024-11-11 PROCEDURE — 82962 GLUCOSE BLOOD TEST: CPT

## 2024-11-11 PROCEDURE — 2580000003 HC RX 258: Performed by: STUDENT IN AN ORGANIZED HEALTH CARE EDUCATION/TRAINING PROGRAM

## 2024-11-11 RX ORDER — FERROUS SULFATE 325(65) MG
325 TABLET ORAL
Qty: 30 TABLET | Refills: 0 | Status: SHIPPED | OUTPATIENT
Start: 2024-11-11 | End: 2024-12-11

## 2024-11-11 RX ORDER — BUMETANIDE 2 MG/1
2 TABLET ORAL DAILY
Qty: 30 TABLET | Refills: 3 | Status: SHIPPED | OUTPATIENT
Start: 2024-11-12

## 2024-11-11 RX ORDER — CARVEDILOL 6.25 MG/1
6.25 TABLET ORAL 2 TIMES DAILY WITH MEALS
Qty: 60 TABLET | Refills: 3 | Status: SHIPPED | OUTPATIENT
Start: 2024-11-11 | End: 2024-11-11 | Stop reason: HOSPADM

## 2024-11-11 RX ORDER — BUMETANIDE 1 MG/1
2 TABLET ORAL DAILY
Status: DISCONTINUED | OUTPATIENT
Start: 2024-11-12 | End: 2024-11-11 | Stop reason: HOSPADM

## 2024-11-11 RX ADMIN — IRON SUCROSE 200 MG: 20 INJECTION, SOLUTION INTRAVENOUS at 08:24

## 2024-11-11 RX ADMIN — OXYCODONE 5 MG: 5 TABLET ORAL at 08:20

## 2024-11-11 RX ADMIN — CEFTRIAXONE 1000 MG: 1 INJECTION, POWDER, FOR SOLUTION INTRAMUSCULAR; INTRAVENOUS at 05:42

## 2024-11-11 RX ADMIN — OXYCODONE 5 MG: 5 TABLET ORAL at 00:04

## 2024-11-11 RX ADMIN — HEPARIN SODIUM 5000 UNITS: 5000 INJECTION INTRAVENOUS; SUBCUTANEOUS at 05:43

## 2024-11-11 RX ADMIN — SODIUM CHLORIDE, PRESERVATIVE FREE 10 ML: 5 INJECTION INTRAVENOUS at 08:22

## 2024-11-11 ASSESSMENT — PAIN DESCRIPTION - ORIENTATION
ORIENTATION: RIGHT;LEFT
ORIENTATION: LEFT;RIGHT

## 2024-11-11 ASSESSMENT — PAIN SCALES - GENERAL
PAINLEVEL_OUTOF10: 6
PAINLEVEL_OUTOF10: 7
PAINLEVEL_OUTOF10: 2

## 2024-11-11 ASSESSMENT — PAIN DESCRIPTION - DESCRIPTORS
DESCRIPTORS: ACHING;DISCOMFORT
DESCRIPTORS: THROBBING

## 2024-11-11 ASSESSMENT — PAIN DESCRIPTION - LOCATION
LOCATION: FOOT;LEG
LOCATION: LEG

## 2024-11-11 ASSESSMENT — PAIN - FUNCTIONAL ASSESSMENT: PAIN_FUNCTIONAL_ASSESSMENT: PREVENTS OR INTERFERES SOME ACTIVE ACTIVITIES AND ADLS

## 2024-11-11 NOTE — DISCHARGE INSTRUCTIONS
You were treated for leg swelling, likely from too much fluid in your body. You were seen by the kidney doctor and your medications were adjusted. Please take the medications as listed and followup with the doctors as listed. It is important you have your kidney function checked in the next week with your primary care doctor or the kidney doctor. Go to the nearest emergency room if you notice worsening fevers and chills at home. Discuss when to restart your valsartan with Dr. Valencia.

## 2024-11-11 NOTE — PLAN OF CARE
Problem: Chronic Conditions and Co-morbidities  Goal: Patient's chronic conditions and co-morbidity symptoms are monitored and maintained or improved  11/9/2024 2202 by Artur Suárez RN  Outcome: Progressing  11/9/2024 2032 by Terry Rios RN  Outcome: Progressing     Problem: Discharge Planning  Goal: Discharge to home or other facility with appropriate resources  11/9/2024 2202 by Artur Suárez RN  Outcome: Progressing  11/9/2024 2032 by Terry Rios RN  Outcome: Progressing     Problem: Pain  Goal: Verbalizes/displays adequate comfort level or baseline comfort level  11/9/2024 2202 by Artur Suárez RN  Outcome: Progressing  11/9/2024 2032 by Terry Rios RN  Outcome: Progressing     Problem: Safety - Adult  Goal: Free from fall injury  11/9/2024 2202 by Artur Suárez RN  Outcome: Progressing  11/9/2024 2032 by Terry Rios RN  Outcome: Progressing     
  Problem: Chronic Conditions and Co-morbidities  Goal: Patient's chronic conditions and co-morbidity symptoms are monitored and maintained or improved  Outcome: Progressing     Problem: Discharge Planning  Goal: Discharge to home or other facility with appropriate resources  Outcome: Progressing     Problem: Pain  Goal: Verbalizes/displays adequate comfort level or baseline comfort level  Outcome: Progressing     Problem: Safety - Adult  Goal: Free from fall injury  Outcome: Progressing     
  Problem: Chronic Conditions and Co-morbidities  Goal: Patient's chronic conditions and co-morbidity symptoms are monitored and maintained or improved  Outcome: Progressing     Problem: Discharge Planning  Goal: Discharge to home or other facility with appropriate resources  Outcome: Progressing     Problem: Pain  Goal: Verbalizes/displays adequate comfort level or baseline comfort level  Outcome: Progressing     Problem: Safety - Adult  Goal: Free from fall injury  Outcome: Progressing     
Nonbillable update. Saw at bedside. Noting bilateral leg pain, likely 2/2 fluid overload. Nephrology consulted. Diuresing. Echo pending, blood cultures ordered given persistent fevers. Continue abx for cellulitis. Agree with plan per Dr. Vallecillo.      David L Mendel, MD    
function  Outcome: Progressing  Goal: Maintains adequate nutritional intake  Outcome: Progressing     Problem: Metabolic/Fluid and Electrolytes - Adult  Goal: Electrolytes maintained within normal limits  Outcome: Progressing     Problem: Hematologic - Adult  Goal: Maintains hematologic stability  Outcome: Progressing

## 2024-11-11 NOTE — CARE COORDINATION
(see comment)  (Family to transport)   Confirm Follow Up Transport Family   Condition of Participation: Discharge Planning   The Plan for Transition of Care is related to the following treatment goals: Pt will discharge home with follow up appt   The Patient and/or Patient Representative was provided with a Choice of Provider? Patient   The Patient and/Or Patient Representative agree with the Discharge Plan? Yes   Freedom of Choice list was provided with basic dialogue that supports the patient's individualized plan of care/goals, treatment preferences, and shares the quality data associated with the providers?  No  (N/A)       Advance Care Planning     General Advance Care Planning (ACP) Conversation    Date of Conversation: 11/11/2024  Conducted with: Patient with Decision Making Capacity  Other persons present: Daughter      Healthcare Decision Maker: No healthcare decision makers have been documented.       Content/Action Overview:  Has NO ACP documents-Information provided  Reviewed DNR/DNI and patient elects Full Code (Attempt Resuscitation)        Length of Voluntary ACP Conversation in minutes:  <16 minutes (Non-Billable)    JOELLE Quintero,  [phone]

## 2024-11-12 LAB
ANA SER QL: NEGATIVE
C-ANCA TITR SER IF: NORMAL TITER
C3 SERPL-MCNC: 136 MG/DL (ref 82–167)
C4 SERPL-MCNC: 37 MG/DL (ref 12–38)
KAPPA LC FREE SER-MCNC: 106 MG/L (ref 3.3–19.4)
KAPPA LC FREE/LAMBDA FREE SER: 1.3 (ref 0.26–1.65)
LAMBDA LC FREE SERPL-MCNC: 81.5 MG/L (ref 5.7–26.3)
MYELOPEROXIDASE AB SER IA-ACNC: <0.2 UNITS (ref 0–0.9)
P-ANCA ATYPICAL TITR SER IF: NORMAL TITER
P-ANCA TITR SER IF: NORMAL TITER
PROTEINASE3 AB SER IA-ACNC: <0.2 UNITS (ref 0–0.9)

## 2024-11-13 ENCOUNTER — TELEPHONE (OUTPATIENT)
Age: 39
End: 2024-11-13

## 2024-11-13 NOTE — TELEPHONE ENCOUNTER
Adan Fraga MD     The above patient was discharged on 11/11/24 from [facility] with a diagnosis of renal failure. She does not have a [facility] appt scheduled. Please call the patient within the required two business days, and document that call using the below smartphrase: .TCMOFFICE.    Thank you.

## 2024-11-14 LAB
ALBUMIN SERPL ELPH-MCNC: 2.7 G/DL (ref 2.9–4.4)
ALBUMIN/GLOB SERPL: 1 (ref 0.7–1.7)
ALPHA1 GLOB SERPL ELPH-MCNC: 0.3 G/DL (ref 0–0.4)
ALPHA2 GLOB SERPL ELPH-MCNC: 0.8 G/DL (ref 0.4–1)
B-GLOBULIN SERPL ELPH-MCNC: 0.8 G/DL (ref 0.7–1.3)
BACTERIA SPEC CULT: NORMAL
BACTERIA SPEC CULT: NORMAL
GAMMA GLOB SERPL ELPH-MCNC: 1 G/DL (ref 0.4–1.8)
GLOBULIN SER-MCNC: 2.9 G/DL (ref 2.2–3.9)
IGA SERPL-MCNC: 141 MG/DL (ref 87–352)
IGG SERPL-MCNC: 1008 MG/DL (ref 586–1602)
IGM SERPL-MCNC: 262 MG/DL (ref 26–217)
INTERPRETATION SERPL IEP-IMP: ABNORMAL
M PROTEIN SERPL ELPH-MCNC: ABNORMAL G/DL
PROT SERPL-MCNC: 5.6 G/DL (ref 6–8.5)
SERVICE CMNT-IMP: NORMAL
SERVICE CMNT-IMP: NORMAL

## 2024-11-14 NOTE — PROGRESS NOTES
Name: Rochelle José   MRN: [id number]  : 1985  Assessment:    Progressive CKD (Dr Chicas); Creatinine has been on a rise for the past 1-2 years, it was 3.4 in , now 3.8 to 4; presumed DKD + HTN    DM-2, controlled. A1C 6.7  NRP/mild associated hematuria-suspect due to DN/HTN/Obesity. Repeat serologies pending; previous w/u: [facility] [place]. Mildly elevated LCR. LAUREN/ANCA OK.; UPC 6.4 grams  HTN  Anemia of CKD  LE edema, Acute on chronic- due to CKD/NRP  Fever    Discussion-     Cr 3.8 to 4 to 4.3 with needed diuresis. Diuresing well.   CT with no hydro  Doppler neg for DVT  PTA   she was using Bumex 2 mg PO QD PRN for worsening of edema but not consistently. Was also not using Valsartan.      Plan/Recommendations:    IV Bumex held 11/10 - uop 2450; home on Bumex 2 mg PO daily  Ok for discharge from my standpoint with close outpatient f/u (has an appt with Dr. Valencia on Monday,  at 1:15 PM)  Ct IV Venofer while here(200 mg x 5 doses)  Coreg 6.25 mg BID  No ARB at present - restart once creatinine stablizes?  Holding metolazone for now  Low salt/FR  F/u on pending serologies  Pending PTH       Dr Wang and I discussed renal prognosis and answered all her q's about CKD, Dialysis, transplant  Daily wt (we reviewed proper daily weight at home and recording these)  Daily labs  No metformin on d/c     Subjective:  Resting. Feels better. Improved edema. On room air. Not sob.     ROS:   No nausea, no vomiting  No chest pain, no shortness of breath    Exam:  /70   Pulse 74   Temp 98.1 °F (36.7 °C)   Resp 15   Ht 1.651 m (5' 5\")   Wt 109.1 kg (240 lb 8.4 oz)   SpO2 100%   BMI 40.02 kg/m²     NAD  Clear  RRR, distant  1-2+ BONNIE  AOx3    Labs/Data:    Lab Results   Component Value Date    WBC 6.1 11/10/2024    HGB 7.8 (L) 11/10/2024    HCT 24.5 (L) 11/10/2024    
      Hospitalist Progress Note    NAME:   Rochelle José   : 1985   MRN: [id number]     Date/Time: 11/10/2024 2:34 PM  Patient PCP: Adan Fraga MD    Estimated discharge date:   Barriers: renal improvement, LE edema improvement, echo      Assessment / Plan:    Volume overloaded-severe bilateral lower extremity edema  Admit to telemetry monitoring  Twice daily IV Bumex  -Consider reinitiation of PTA metolazone if inadequate urine output  -Strict intake and output  -Daily weights  -Monitor renal function electrolytes and address as needed  Elevate BLE and apply Ace wraps  Obtain CT abdomen pelvis to evaluate for underlying NAZARIO/cirrhotic changes versus renal disease     Does not appear patient has had full/adequate workup as to why she is developing edema.  Does not appear to have poorly controlled hypertension or diabetes despite limited medication compliance.  Previously renal disease was CKD 3 and I would not expect this degree of volume retention.  She has no history of heart failure.  Denies cocaine use.  No known cardiomyopathy.  Does not have known cirrhosis.  There may be a genetic component given her father was on dialysis, but I feel like we need to definitively determine etiology for edema to ensure there is not some reversible progressing underlying pathology.     - Cr worsened slightly today, holding bumex  - Renal following, appreciate assistance  - LE duplex neg  - echo remains pending    PROSPER on CKD 3b  Normocytic anemia  Nephrology consulted, greatly appreciate their expertise  Anemia studies: Iron panel, B12 with reflex, reticulocytes  Renally dose medications and avoid nephrotoxic agents  -Suspect prerenal insult from volume overload   - bumex as above     LLE cellulitis  Start empiric ceftriaxone     Essential hypertension  Hold PTA valsartan in setting of PROSPER  Hydralazine as needed hypertensive urgency/emergency     Diabetes mellitus  Corrective coverage 
  Physician Progress Note      PATIENT:               RAOUL ROLAND  CSN #:                  [id number]  :                       1985  ADMIT DATE:       2024 12:56 AM  DISCH DATE:        2024 4:40 PM  RESPONDING  PROVIDER #:        David L Mendel MD          QUERY TEXT:    Pt admitted with Lower extremity cellulitis. Pt noted to have DM. If possible,   please document in progress notes and discharge summary the relationship, if   any, between cellulitis and DM.    The medical record reflects the following:    Risk Factors:  38 yo female with LE cellulitis and DM    Clinical Indicators:   H&P: LLE cellulitis, Diabetes mellitus    Treatment:  empiric ceftriaxone, rocephin,  corrective insulin coverage, diabetic diet,   A1c chech, bumex, labs, daily weights, I&Os  Options provided:  -- Lower extremity cellulitis associated with Diabetes  -- Lower extremity cellulitis unrelated to Diabetes  -- Other - I will add my own diagnosis  -- Disagree - Not applicable / Not valid  -- Disagree - Clinically unable to determine / Unknown  -- Refer to Clinical Documentation Reviewer    PROVIDER RESPONSE TEXT:    Lower extremity cellulitis unrelated to Diabetes.    Query created by: Raoul Granado on 2024 12:15 PM      Electronically signed by:  David L Mendel MD 2024 12:31 PM          
Discharge education given. Opportunity for questions and feedback given. Patient remained stable at time of discharge.   
End of Shift Note    Bedside shift change report given to Re HUBBARD  (oncoming nurse) by Terry Rios RN (offgoing nurse).  Report included the following information SBAR, Kardex, ED Summary, Intake/Output, MAR, Accordion, Recent Results, Med Rec Status, Cardiac Rhythm NS, Alarm Parameters , and Quality Measures    Shift worked:  7a-7p     Shift summary and any significant changes:     Patient tolerated diet. Voiced complaint of pain. PRN medication given. Patient remained stable at the time of shift change.      Concerns for physician to address:  None      Zone phone for oncoming shift:   [phone]       Activity:     Number times ambulated in hallways past shift: 0  Number of times OOB to chair past shift: 0    Cardiac:   Cardiac Monitoring: Yes           Access:  Current line(s): PIV     Genitourinary:   Urinary status: voiding    Respiratory:      Chronic home O2 use?: NO  Incentive spirometer at bedside: NO       GI:     Current diet:  ADULT DIET; Regular; 4 carb choices (60 gm/meal); Low Fat/Low Chol/High Fiber/DEBRA; 1500 ml  Passing flatus: YES  Tolerating current diet: YES       Pain Management:   Patient states pain is manageable on current regimen: YES    Skin:     Interventions: increase time out of bed, PT/OT consult, and nutritional support    Patient Safety:  Fall Score:    Interventions: bed/chair alarm, gripper socks, and stay with me (per policy)       Length of Stay:  Expected LOS: 3  Actual LOS: 0      Terry Rios RN                           
End of Shift Note    Bedside shift change report given to STEVIE Siddiqui  (oncoming nurse) by Terry Rios RN (offgoing nurse).  Report included the following information SBAR, Kardex, ED Summary, Procedure Summary, Intake/Output, MAR, Accordion, Recent Results, Med Rec Status, Cardiac Rhythm NS, Alarm Parameters , and Quality Measures    Shift worked:  7a-7p     Shift summary and any significant changes:     Patient ambulated with assistance. Tolerating diet. Voiced complaint of pain PRN medication given.      Concerns for physician to address:  None      Zone phone for oncoming shift:   [phone]       Activity:     Number times ambulated in hallways past shift: 1  Number of times OOB to chair past shift: 1    Cardiac:   Cardiac Monitoring: Yes           Access:  Current line(s): PIV     Genitourinary:   Urinary status: voiding    Respiratory:      Chronic home O2 use?: NO  Incentive spirometer at bedside: NO       GI:     Current diet:  ADULT DIET; Regular; 4 carb choices (60 gm/meal); Low Fat/Low Chol/High Fiber/DEBRA; 1500 ml  Passing flatus: YES  Tolerating current diet: YES       Pain Management:   Patient states pain is manageable on current regimen: YES    Skin:     Interventions: increase time out of bed and nutritional support    Patient Safety:  Fall Score:    Interventions: assistive device (walker, cane. etc), gripper socks, and stay with me (per policy)       Length of Stay:  Expected LOS: 3  Actual LOS: 1      Terry Rios RN                            
End of Shift Note    Bedside shift change report given to STEVIE Stewart (oncoming nurse) by Artur Suárez RN (offgoing nurse).  Report included the following information SBAR REPORTS LIST: SBAR, Kardex, Intake/Output, MAR, and Recent Results    Shift worked:  1900-0730     Shift summary and any significant changes:     Pt had a fair  night. IV access reinserted at L ac. Asked for x1 oxycodone for pain. Echo still to be done. No other concerns at this time.      Concerns for physician to address:      Zone phone for oncoming shift:   [phone]       Activity:  Activity: Out of bed and Ambulate  Number times ambulated in hallways past shift: 1  Number of times OOB to chair past shift: 3    Cardiac:   Cardiac Monitoring: YES / NO: Yes        Access:  Current line(s): IV ACCESS: - PIV    Genitourinary:   Urinary status: Urinary status: Patient is voiding without difficulty.    Respiratory:   oxygen delivery: room air  Chronic home O2 use?: YES / NO: No  Incentive spirometer at bedside: YES / NO: No      GI:  Current diet:  DIET: regular  Passing flatus: YES / NO: Yes  Tolerating current diet: YES / NO: Yes      Pain Management:   Patient states pain is manageable on current regimen: YES / NO: Yes    Skin:    Interventions: RENETTA SCALE: 20    Patient Safety:  Fall Score: FALL RISK ASSESSMENT: Not at risk for falls.  Interventions: Fall Interventions Provided: Implemented/recommended use of non-skid footwear      Length of Stay:  Expected LOS: 3  Actual LOS: 2      Artur Suárez RN                            
      Lab Results   Component Value Date/Time     11/10/2024 05:48 AM    K 3.9 11/10/2024 05:48 AM     11/10/2024 05:48 AM    CO2 25 11/10/2024 05:48 AM    BUN 32 11/10/2024 05:48 AM    CREATININE 4.03 11/10/2024 05:48 AM    GLUCOSE 101 11/10/2024 05:48 AM    GLUCOSE 188 08/22/2023 12:00 AM    CALCIUM 8.4 11/10/2024 05:48 AM    LABGLOM 14 11/10/2024 05:48 AM    LABGLOM 30 08/22/2023 12:00 AM        Wt Readings from Last 3 Encounters:   11/10/24 109.1 kg (240 lb 8.4 oz)   02/27/24 107.9 kg (237 lb 12.8 oz)   02/25/24 106.3 kg (234 lb 5.6 oz)         Intake/Output Summary (Last 24 hours) at 11/10/2024 1834  Last data filed at 11/10/2024 1030  Gross per 24 hour   Intake 600 ml   Output 1000 ml   Net -400 ml       Patient seen and examined. Chart reviewed. Labs, data and other pertinent notes reviewed in last 24 hrs.    PMH/SH/FH reviewed and unchanged compared to H&P    Delano Wang MD

## 2024-11-14 NOTE — DISCHARGE SUMMARY
of PROSPER, to restart after CR stabilizes outpatient per Nephro  Hydralazine as needed hypertensive urgency/emergency  - changed bumex to daily instead of prn     Diabetes mellitus  Corrective coverage insulin  Accu-Cheks  Diabetic diet  Hypoglycemia protocol in place  Good candidate for SGLT2 consideration-defer to PCP/nephrology         Discharge Exam:  Patient seen and examined by me on discharge day.  Pertinent Findings:  No data found.    Gen:    Not in distress  Chest: Clear lungs  CVS:   Regular rate.  Trace bilateral edema, improved  Abd:  Soft, not distended, not tender  Neuro: awake, moving all exts    Discharge/Recent Laboratory Results:  No results for input(s): \"NA\", \"K\", \"CL\", \"CO2\", \"BUN\", \"CREATININE\", \"GLUCOSE\", \"CALCIUM\", \"PHOS\", \"MG\" in the last 72 hours.  No results for input(s): \"HGB\", \"HCT\", \"WBC\", \"PLT\" in the last 72 hours.    Discharge Medications:     Medication List        START taking these medications      cephALEXin 250 MG capsule  Commonly known as: KEFLEX  Take 1 capsule by mouth every 8 hours for 12 doses            CHANGE how you take these medications      bumetanide 2 MG tablet  Commonly known as: BUMEX  Take 1 tablet by mouth daily  What changed:   medication strength  how much to take  when to take this            CONTINUE taking these medications      ferrous sulfate 325 (65 Fe) MG tablet  Commonly known as: IRON 325  Take 1 tablet by mouth daily (with breakfast)            STOP taking these medications      valsartan 40 MG tablet  Commonly known as: DIOVAN               Where to Get Your Medications        These medications were sent to [facility]/pharmacy #[id number] - [place], [place] - [address] - P [phone] - F [phone]  [address] [address]      Hours: 24-hours Phone: [phone]   bumetanide 2 MG tablet  cephALEXin 250 MG capsule  ferrous sulfate 325 (65 Fe) MG tablet             DISPOSITION:    Home with Family:  y     Home with

## 2024-11-15 ENCOUNTER — OFFICE VISIT (OUTPATIENT)
Age: 39
End: 2024-11-15
Payer: MEDICAID

## 2024-11-15 VITALS
OXYGEN SATURATION: 98 % | TEMPERATURE: 98.6 F | BODY MASS INDEX: 39.11 KG/M2 | SYSTOLIC BLOOD PRESSURE: 147 MMHG | DIASTOLIC BLOOD PRESSURE: 89 MMHG | HEART RATE: 81 BPM | WEIGHT: 235 LBS

## 2024-11-15 DIAGNOSIS — R60.9 EDEMA, UNSPECIFIED TYPE: ICD-10-CM

## 2024-11-15 DIAGNOSIS — E11.9 TYPE 2 DIABETES MELLITUS WITHOUT COMPLICATION, WITHOUT LONG-TERM CURRENT USE OF INSULIN (HCC): Primary | ICD-10-CM

## 2024-11-15 DIAGNOSIS — I10 PRIMARY HYPERTENSION: ICD-10-CM

## 2024-11-15 DIAGNOSIS — N18.9 ACUTE KIDNEY INJURY SUPERIMPOSED ON CKD (HCC): ICD-10-CM

## 2024-11-15 DIAGNOSIS — N18.4 CKD (CHRONIC KIDNEY DISEASE) STAGE 4, GFR 15-29 ML/MIN (HCC): ICD-10-CM

## 2024-11-15 DIAGNOSIS — N17.9 ACUTE KIDNEY INJURY SUPERIMPOSED ON CKD (HCC): ICD-10-CM

## 2024-11-15 PROCEDURE — 3044F HG A1C LEVEL LT 7.0%: CPT | Performed by: FAMILY MEDICINE

## 2024-11-15 PROCEDURE — 3077F SYST BP >= 140 MM HG: CPT | Performed by: FAMILY MEDICINE

## 2024-11-15 PROCEDURE — 3079F DIAST BP 80-89 MM HG: CPT | Performed by: FAMILY MEDICINE

## 2024-11-15 PROCEDURE — 99215 OFFICE O/P EST HI 40 MIN: CPT | Performed by: FAMILY MEDICINE

## 2024-11-15 RX ORDER — LANCETS 30 GAUGE
EACH MISCELLANEOUS
Qty: 100 EACH | Refills: 3 | Status: SHIPPED | OUTPATIENT
Start: 2024-11-15

## 2024-11-15 RX ORDER — CARVEDILOL 6.25 MG/1
TABLET ORAL
COMMUNITY
Start: 2024-11-11

## 2024-11-15 RX ORDER — GLUCOSAMINE HCL/CHONDROITIN SU 500-400 MG
CAPSULE ORAL
Qty: 100 STRIP | Refills: 3 | Status: SHIPPED | OUTPATIENT
Start: 2024-11-15

## 2024-11-15 ASSESSMENT — PATIENT HEALTH QUESTIONNAIRE - PHQ9
2. FEELING DOWN, DEPRESSED OR HOPELESS: NOT AT ALL
SUM OF ALL RESPONSES TO PHQ QUESTIONS 1-9: 0
SUM OF ALL RESPONSES TO PHQ QUESTIONS 1-9: 0
1. LITTLE INTEREST OR PLEASURE IN DOING THINGS: NOT AT ALL
SUM OF ALL RESPONSES TO PHQ9 QUESTIONS 1 & 2: 0
SUM OF ALL RESPONSES TO PHQ QUESTIONS 1-9: 0
SUM OF ALL RESPONSES TO PHQ QUESTIONS 1-9: 0

## 2024-11-15 NOTE — PROGRESS NOTES
Chief Complaint   Patient presents with    Follow-Up from Hospital         Health Maintenance Due   Topic Date Due    Pneumococcal 0-64 years Vaccine (1 of 2 - PCV) Never done    Diabetic foot exam  Never done    Varicella vaccine (1 of 2 - 13+ 2-dose series) Never done    Diabetic Alb to Cr ratio (uACR) test  Never done    Hepatitis B vaccine (1 of 3 - 19+ 3-dose series) Never done    Cervical cancer screen  Never done    Breast cancer screen  Never done    Flu vaccine (1) 08/01/2024    COVID-19 Vaccine (1 - 2023-24 season) Never done         \"Have you been to the ER, urgent care clinic since your last visit?  Hospitalized since your last visit?\"    YES - When: approximately 1  weeks ago.  Where and Why: [facility] for swelling.    “Have you seen or consulted any other health care providers outside of [facility] since your last visit?”    NO       Have you had a mammogram?”   NO    No breast cancer screening on file      “Have you had a pap smear?”    NO    No cervical cancer screening on file

## 2024-11-15 NOTE — PROGRESS NOTES
MURRAY  Rochelle José is a 39 y.o. female who Presents to follow-up hospital stay 11/9 - 11/11 complaining of progressively worsening lower extremity edema over the past few weeks.       She establish care with me 8/2023.  At that time she had significant elevated blood pressure and lower extremity edema.  I theorized diastolic heart failure based on the available information.  And the plan at the time was to see nephrology and cardiology.  Based on her recollection and she saw cardiology and was told everything looked okay.  Hospitalist notes that there does not appear to have been a complete workup to determine etiology of lower extremity edema     Evidently febrile to 102 in the emergency room.  Chest x-ray demonstrates no acute process.  Does not look like a source of the fever was determined    Hemoglobin 8.4 (chronic noted to be 14.8 back in 2019.  Then there is a gap until 2022 at which point it was 8.5)    Was admitted and given twice daily IV Bumex    Lower extremity duplex was negative  Echo showed a normal EF.  Mild mitral regurg.    Had an PROSPER?  Creatinine up to 4.3 GFR down to 13.  Valsartan held    Nephrology consulted and recommended close nephro follow-up outpatient.    Diabetes.  Has not been taking any medicine.  A1c was 6.7 on hospital admission      Iron deficient, Venofer 200 mg x 5 doses inpatient    PMHx:  Past Medical History:   Diagnosis Date    Abnormal Pap smear     abnormal pap at 14 y/o normal since     Adjustment disorder with depressed mood 5/13/2020    Back pain 2015 feb    [facility]  emergency [facility]    Diabetes (HCC)     Diabetes mellitus (HCC)     Gestational Diabetes with last two pregnancies-diet controlled       Meds:   Current Outpatient Medications   Medication Sig Dispense Refill    blood glucose monitor kit and supplies Test blood sugar once daily. Dispense all needed supplies to include: monitor, strips, lancing device, lancets, control solutions, alcohol swabs. E11.9 1 kit 0

## 2024-11-22 LAB — VIT B12 SERPL-MCNC: 292 PG/ML (ref 232–1245)

## 2025-05-19 NOTE — ED NOTES
Anesthesia Post-op Note    Patient: Baldomero Jackson Jr  Procedure(s) Performed: RELEASE, CARPAL TUNNEL, ENDOSCOPIC: left endoscopic carpal tunnel release (ECTR) left elbow ulnar nerve decompression, left elbow lateral epicondyle debridement and tendon repair (Left: Wrist)  DECOMPRESSION, NERVE, ULNAR (Left: Arm Lower)  RELEASE, ELBOW, EPICONDYLE (Left: Elbow)  SURGICAL REPAIR, FOR LATERAL EPICONDYLITIS (Right: Elbow)  Anesthesia type: General    Vitals Value Taken Time   Temp 36.3 °C (97.4 °F) 05/19/25 1719   Pulse 73 05/19/25 1719   Resp 13 05/19/25 1719   SpO2 95 % 05/19/25 1719   BP 85/54 05/19/25 1719         Patient Location: PACU Phase 1  Post-op Vital Signs:stable  Level of Consciousness: sedated  Respiratory Status: spontaneous ventilation  Cardiovascular stable  Hydration: euvolemic  Pain Management: adequately controlled  Handoff: Handoff to receiving clinician was performed and questions were answered  Vomiting: none  Nausea: None  Airway Patency:oral airway  Post-op Assessment: no complications and patient tolerated procedure well      There were no known notable events for this encounter.                       States she recently started taking magnesium and metolazone but otherwise no new changes to any skin/hair products or new foods and nothing new with her daily routine.